# Patient Record
Sex: FEMALE | Race: WHITE | NOT HISPANIC OR LATINO | Employment: OTHER | URBAN - METROPOLITAN AREA
[De-identification: names, ages, dates, MRNs, and addresses within clinical notes are randomized per-mention and may not be internally consistent; named-entity substitution may affect disease eponyms.]

---

## 2017-01-03 RX ORDER — DULOXETIN HYDROCHLORIDE 60 MG/1
CAPSULE, DELAYED RELEASE ORAL
Qty: 30 CAPSULE | Refills: 0 | Status: SHIPPED | OUTPATIENT
Start: 2017-01-03 | End: 2017-01-30 | Stop reason: SDUPTHER

## 2017-01-05 ENCOUNTER — TELEPHONE (OUTPATIENT)
Dept: NEUROLOGY | Facility: CLINIC | Age: 62
End: 2017-01-05

## 2017-01-05 NOTE — TELEPHONE ENCOUNTER
S/W Ms. Garcia, she states still has some numbness left hand at times but so much improved.  We reviewed her meds and confirmed her F/U appt  2-28-17 she has received her new patient packet already.  We reviewed S/S of stroke and to call 911 at any signs.  ELHAM Vega RN

## 2017-01-20 ENCOUNTER — OFFICE VISIT (OUTPATIENT)
Dept: INTERNAL MEDICINE | Facility: CLINIC | Age: 62
End: 2017-01-20

## 2017-01-20 VITALS
BODY MASS INDEX: 30.56 KG/M2 | TEMPERATURE: 98.8 F | DIASTOLIC BLOOD PRESSURE: 84 MMHG | RESPIRATION RATE: 16 BRPM | HEART RATE: 84 BPM | WEIGHT: 179 LBS | HEIGHT: 64 IN | SYSTOLIC BLOOD PRESSURE: 120 MMHG | OXYGEN SATURATION: 99 %

## 2017-01-20 DIAGNOSIS — E03.9 ACQUIRED HYPOTHYROIDISM: ICD-10-CM

## 2017-01-20 DIAGNOSIS — I63.81 RIGHT-SIDED LACUNAR INFARCTION (HCC): ICD-10-CM

## 2017-01-20 DIAGNOSIS — I10 ESSENTIAL HYPERTENSION: Primary | ICD-10-CM

## 2017-01-20 PROBLEM — I73.00 RAYNAUD'S DISEASE WITHOUT GANGRENE: Status: ACTIVE | Noted: 2017-01-20

## 2017-01-20 PROCEDURE — 99213 OFFICE O/P EST LOW 20 MIN: CPT | Performed by: INTERNAL MEDICINE

## 2017-01-20 NOTE — PROGRESS NOTES
Subjective   Candy Garcia is a 61 y.o. female.   1/1/2017  Wt Readings from Last 1 Encounters:   01/20/17 179 lb (81.2 kg)    she was last seen December 2016, weight 187 then, 179 now.    She returns for follow-up of hypertension, recent lacunar stroke, hyperlipidemia, hypothyroidism    History of Present Illness   She was hospitalized late November 2016 presenting with left face and arm numbness.  She was found to have a right thalamic lacunar infarct measuring 1.1 x 0.3 cm.  Her MRA was negative and she was started on medication for blood pressure and cholesterol as well as aspirin.  She was taken off of estrogenic.  She has been working on weight reduction and increasing her exercise.  Blood pressures at home are looking much better, generally in the 120/70 range.  She still has some residual numbness in the extremities.  Her pressure was elevated recently and her medication was changed to lisinopril/HCTZ 20/12.5 one each day with benefit.  She does not have any chest pain or known ischemic heart disease.    She has had Raynaud's disease since in her 20s.  She describes typical color changes in the intensive fingers on cold exposure.  The following portions of the patient's history were reviewed and updated as appropriate: allergies, current medications, past family history, past medical history, past social history, past surgical history and problem list.    Review of Systems   Constitutional: Negative.    HENT: Negative.    Eyes: Negative.    Respiratory: Negative.  Negative for shortness of breath.    Cardiovascular: Negative.  Negative for chest pain and palpitations.   Endocrine: Negative.    Genitourinary: Negative.    Skin: Negative.    Neurological: Negative.  Negative for headaches.   Hematological: Negative.    Psychiatric/Behavioral: Negative.        Objective   Physical Exam   Constitutional: She is oriented to person, place, and time. She appears well-developed and well-nourished.   HENT:   Head:  Normocephalic and atraumatic.   Cardiovascular: Normal rate and regular rhythm.  Exam reveals no gallop and no friction rub.    No murmur heard.  Pulmonary/Chest: Effort normal and breath sounds normal. No respiratory distress. She has no wheezes. She has no rales.   Abdominal: She exhibits no distension and no mass. There is no tenderness.   Neurological: She is alert and oriented to person, place, and time. She has normal reflexes.   Gait coordination and motor strength are all normal   Skin: Skin is warm.   Psychiatric: Her behavior is normal.   Vitals reviewed.      Assessment/Plan   Candy was seen today for hypertension, hyperlipidemia and hypothyroidism.    Diagnoses and all orders for this visit:    Essential hypertension  Comments:  Blood pressure 130/80 by my determination and somewhat lower at home, continue lisinopril 20 mg and HCTZ 12.5 mg daily  Orders:  -     Comprehensive Metabolic Panel    Acquired hypothyroidism  Comments:  Continue levothyroxin 112 µg daily    Right-sided lacunar infarction  Comments:  Continue aspirin daily and atorvastatin 40 mg daily, we will check chemistries and lipids  Orders:  -     Lipid Panel        Schedule office follow-up about 4 months, return sooner if needed                    EMR Dragon/Transcription disclaimer:      Much of this encounter note is an electronic transcription/translation of spoken language to printed text. The electronic translation of spoken language may permit erroneous, or at times, nonsensical words or phrases to be inadvertently transcribed; Although I have reviewed the note for such errors, some may still exist.

## 2017-01-20 NOTE — MR AVS SNAPSHOT
Candy Garcia   1/20/2017 2:00 PM   Office Visit    Dept Phone:  [phone]   Encounter #:  [id number]    Provider:  Aaron Allison MD   Department:  [facility] INTERNAL MEDICINE                Your Full Care Plan              Your Updated Medication List          This list is accurate as of: 1/20/17  2:49 PM.  Always use your most recent med list.                albuterol 108 (90 BASE) MCG/ACT inhaler   Commonly known as:  PROVENTIL HFA;VENTOLIN HFA       aspirin  MG tablet   Take 1 tablet by mouth Daily.       atorvastatin 40 MG tablet   Commonly known as:  LIPITOR   Take 1 tablet by mouth Every Night.       DULoxetine 60 MG capsule   Commonly known as:  CYMBALTA   TAKE ONE CAPSULE BY MOUTH DAILY       Halcinonide 0.1 % cream       levothyroxine 112 MCG tablet   Commonly known as:  SYNTHROID, LEVOTHROID   TAKE ONE TABLET BY MOUTH DAILY FOR THYROID       lisinopril 10 MG tablet   Commonly known as:  PRINIVIL,ZESTRIL   Take 1 tablet by mouth Daily.       lisinopril-hydrochlorothiazide 20-12.5 MG per tablet   Commonly known as:  PRINZIDE,ZESTORETIC   Take 1 tablet by mouth Daily.       omeprazole 20 MG capsule   Commonly known as:  priLOSEC               We Performed the Following     Comprehensive Metabolic Panel     Lipid Panel       You Were Diagnosed With        Codes Comments    Essential hypertension    -  Primary ICD-10-CM: I10  ICD-9-CM: 401.9 Blood pressure 130/80 by my determination and somewhat lower at home, continue lisinopril 20 mg and HCTZ 12.5 mg daily    Acquired hypothyroidism     ICD-10-CM: E03.9  ICD-9-CM: 244.9 Continue levothyroxin 112 µg daily    Right-sided lacunar infarction     ICD-10-CM: I63.9  ICD-9-CM: 434.91 Continue aspirin daily and atorvastatin 40 mg daily, we will check chemistries and lipids      Instructions     None    Patient Instructions History      Upcoming Appointments     Visit Type Date Time Department    OFFICE VISIT  "1/20/2017  2:00 PM [facility] 4003    FOLLOW UP 2/28/2017  8:30 AM [facility]    OFFICE VISIT 5/26/2017 10:00 AM [facility] 4003      MyChart Signup     Our records indicate that you have an active [facility] account.    You can view your After Visit Summary by going to [employer] and logging in with your [facility] username and password.  If you don't have a [facility] username and password but a parent or guardian has access to your record, the parent or guardian should login with their own [facility] username and password and access your record to view the After Visit Summary.    If you have questions, you can email [email or web address] or call [phone] to talk to our [facility] staff.  Remember, [employer] is NOT to be used for urgent needs.  For medical emergencies, dial 911.               Other Info from Your Visit           Your Appointments     Feb 28, 2017  8:30 AM EST   Follow Up with TARAS Goldstein   [facility] NEUROLOGY (--)    [address]  [facility] [address]   [phone]           Arrive 30 minutes prior to appointment            May 26, 2017 10:00 AM EDT   Office Visit with Aaron Allison MD   [facility] INTERNAL MEDICINE (--)    [address]  [facility] [address]   [phone]           Arrive 15 minutes prior to appointment.              Allergies     Penicillins  Anaphylaxis    Occurred when pt was 16 years old      Reason for Visit     Hypertension     Hyperlipidemia     Hypothyroidism           Vital Signs     Blood Pressure Pulse Temperature Respirations Height Weight    120/84 (BP Location: Right arm, Patient Position: Sitting, Cuff Size: Small Adult) 84 98.8 °F (37.1 °C) (Tympanic) 16 64\" (162.6 cm) 179 lb (81.2 kg)    Oxygen Saturation Body Mass Index Smoking Status             99% 30.73 kg/m2 Never Smoker         Problems and Diagnoses Noted     High blood pressure    " Underactive thyroid    Raynaud's disease without gangrene    Right-sided lacunar infarction

## 2017-01-21 LAB
ALBUMIN SERPL-MCNC: 5 G/DL (ref 3.5–5.2)
ALBUMIN/GLOB SERPL: 2 G/DL
ALP SERPL-CCNC: 140 U/L (ref 39–117)
ALT SERPL-CCNC: 87 U/L (ref 1–33)
AST SERPL-CCNC: 45 U/L (ref 1–32)
BILIRUB SERPL-MCNC: 0.7 MG/DL (ref 0.1–1.2)
BUN SERPL-MCNC: 12 MG/DL (ref 8–23)
BUN/CREAT SERPL: 14.3 (ref 7–25)
CALCIUM SERPL-MCNC: 10.2 MG/DL (ref 8.6–10.5)
CHLORIDE SERPL-SCNC: 100 MMOL/L (ref 98–107)
CHOLEST SERPL-MCNC: 145 MG/DL (ref 0–200)
CO2 SERPL-SCNC: 32.1 MMOL/L (ref 22–29)
CREAT SERPL-MCNC: 0.84 MG/DL (ref 0.57–1)
GLOBULIN SER CALC-MCNC: 2.5 GM/DL
GLUCOSE SERPL-MCNC: 107 MG/DL (ref 65–99)
HDLC SERPL-MCNC: 63 MG/DL (ref 40–60)
LDLC SERPL CALC-MCNC: 59 MG/DL (ref 0–100)
POTASSIUM SERPL-SCNC: 4.7 MMOL/L (ref 3.5–5.2)
PROT SERPL-MCNC: 7.5 G/DL (ref 6–8.5)
SODIUM SERPL-SCNC: 143 MMOL/L (ref 136–145)
TRIGL SERPL-MCNC: 115 MG/DL (ref 0–150)
VLDLC SERPL CALC-MCNC: 23 MG/DL (ref 5–40)

## 2017-01-30 RX ORDER — DULOXETIN HYDROCHLORIDE 60 MG/1
CAPSULE, DELAYED RELEASE ORAL
Qty: 30 CAPSULE | Refills: 0 | Status: SHIPPED | OUTPATIENT
Start: 2017-01-30 | End: 2017-03-03 | Stop reason: SDUPTHER

## 2017-02-10 DIAGNOSIS — R74.8 ELEVATED LIVER ENZYMES: Primary | ICD-10-CM

## 2017-02-17 ENCOUNTER — HOSPITAL ENCOUNTER (OUTPATIENT)
Dept: ULTRASOUND IMAGING | Facility: HOSPITAL | Age: 62
Discharge: HOME | End: 2017-02-17
Attending: INTERNAL MEDICINE | Admitting: INTERNAL MEDICINE

## 2017-02-17 DIAGNOSIS — R74.8 ELEVATED LIVER ENZYMES: ICD-10-CM

## 2017-02-17 PROCEDURE — 76705 ECHO EXAM OF ABDOMEN: CPT

## 2017-02-21 ENCOUNTER — TELEPHONE (OUTPATIENT)
Dept: INTERNAL MEDICINE | Facility: CLINIC | Age: 62
End: 2017-02-21

## 2017-02-21 NOTE — TELEPHONE ENCOUNTER
The ultrasound of the liver was read as totally normal without any evidence of bile duct problem or enlargement of the liver.  It does not give us the answer for elevated liver enzymes.

## 2017-02-28 ENCOUNTER — OFFICE VISIT (OUTPATIENT)
Dept: NEUROLOGY | Facility: CLINIC | Age: 62
End: 2017-02-28

## 2017-02-28 VITALS
DIASTOLIC BLOOD PRESSURE: 83 MMHG | OXYGEN SATURATION: 97 % | HEIGHT: 64 IN | WEIGHT: 175 LBS | SYSTOLIC BLOOD PRESSURE: 125 MMHG | BODY MASS INDEX: 29.88 KG/M2 | HEART RATE: 67 BPM

## 2017-02-28 DIAGNOSIS — I63.81 RIGHT-SIDED LACUNAR INFARCTION (HCC): ICD-10-CM

## 2017-02-28 DIAGNOSIS — I73.00 RAYNAUD'S DISEASE WITHOUT GANGRENE: ICD-10-CM

## 2017-02-28 DIAGNOSIS — I10 ESSENTIAL HYPERTENSION: ICD-10-CM

## 2017-02-28 DIAGNOSIS — E78.5 HYPERLIPIDEMIA, UNSPECIFIED HYPERLIPIDEMIA TYPE: Primary | ICD-10-CM

## 2017-02-28 DIAGNOSIS — E03.9 HYPOTHYROIDISM, UNSPECIFIED TYPE: ICD-10-CM

## 2017-02-28 PROCEDURE — 99213 OFFICE O/P EST LOW 20 MIN: CPT | Performed by: NURSE PRACTITIONER

## 2017-02-28 RX ORDER — ATORVASTATIN CALCIUM 20 MG/1
20 TABLET, FILM COATED ORAL DAILY
Qty: 30 TABLET | Refills: 11 | Status: SHIPPED | OUTPATIENT
Start: 2017-02-28 | End: 2017-05-22 | Stop reason: SDUPTHER

## 2017-02-28 RX ORDER — CETIRIZINE HYDROCHLORIDE 5 MG/1
5 TABLET ORAL DAILY
COMMUNITY

## 2017-02-28 NOTE — PROGRESS NOTES
"DOS: 2017  NAME: Candy Garcia   : 1955  PCP: Aaron Allison MD    Chief Complaint   Patient presents with   • Stroke      Neurological Problem and Interval History:  61 y.o. RHW female with a Hx of HTN, Hypothyroid, and Raynauds who presents today for 3-month follow-up of stroke.     Ms. Garcia presented on  with left-sided numbness and tingling. MRI brain showed a right thalamic lacunar stroke. Vessel imaging was negative for any significant stenosis and transthoracic echocardiogram was unremarkable for cardioembolic source. The etiology was most likely thought to be hypertension; however the patient was on estrogen therapy at the time therefore a hypercoaguability could not be excluded. She has since discontinue her premarin. She was not on any antiplatelets or statin therapy at the time of her event. She was discharged home on  mg and Lipitor 40 mg. She states she continued with left sided numbness that resolved at least 3 weeks aftet being discharged. She had significant fatigue along with muscle aches and continues with decreased energy levels. She usually walks for exercise but due to chronic left hip pain, has been unable to walk; however she is planning to start a water aerobics program. She is seeing a retinal specialist today, Dr. Christina due to an episode on  in which she started having flashes of light in the left eye with a vague description of \"seeing through something\". She denies any vision loss, dark areas in visual fields or double vision. She also describes having headaches since her hospitalization that she describes as a dull ache that starts in the back of her neck and is alleviated with Tylenol. She denies any associated symptoms, no N/V. She denies any new stroke/TIA symptoms. She takes her BP regularly and reports that her SBP usually runs below 130.     .Review of Systems:        Review of Systems   Constitutional: Negative for activity change, appetite " "change, chills, diaphoresis, fatigue, fever and unexpected weight change.   HENT: Positive for trouble swallowing (liquid). Negative for drooling, hearing loss, tinnitus and voice change.    Eyes: Positive for visual disturbance (flashes of light in light eye - going to see a retina specialist today). Negative for photophobia and pain.   Respiratory: Negative for chest tightness and shortness of breath.    Cardiovascular: Negative for chest pain, palpitations and leg swelling.   Gastrointestinal: Negative for blood in stool, nausea and vomiting.   Endocrine: Negative for cold intolerance and heat intolerance.   Genitourinary: Negative for difficulty urinating.   Musculoskeletal: Positive for neck pain and neck stiffness. Negative for gait problem.   Skin: Negative for rash.   Neurological: Positive for numbness (fingers and toes - patient has a condition her fingers and toes turn blue, white and goes numb) and headaches (more now than ever in her whole life ). Negative for dizziness, tremors, seizures, syncope, facial asymmetry, speech difficulty, weakness and light-headedness.   Hematological: Bruises/bleeds easily.   Psychiatric/Behavioral: Negative for agitation, behavioral problems, confusion, hallucinations, sleep disturbance and suicidal ideas. The patient is not nervous/anxious.        \"The following portions of the patient's history were reviewed and updated as appropriate: allergies, current medications, past family history, past medical history, past social history, past surgical history and problem list.\"  Review and Interpretation of Imaging: Per Dr. Hubbard,  MRI brain 11/29/16: Acute stroke in the right thalamus. Minimal white matter disease. GRE sequences are positive for a single lesion in the right basis pontis.  MRA h/n 11/29/16: MRA brain normal. With bilateral small posterior communicating arteries. 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 Codominant vertebral arteries and no significant " stenosis     TTE 11/30/16: LVEF 60%, no mass or thrombus; RV nml, LA nml size, No ASD/PFO, saline tests negative; RA nml    Laboratory Results:            Anti-DNA (DS) Ab Qn <1  0 - 9 IU/mL Final   Comment:                                      Negative      <5                                      Equivocal  5 - 9                                      Positive      >9   RNP Antibodies <0.2  0.0 - 0.9 AI Final   Healy Antibodies <0.2  0.0 - 0.9 AI Final   Antiscleroderma-70 Antibodies <0.2  0.0 - 0.9 AI Final   JOYCE SSA (RO) Ab 0.6  0.0 - 0.9 AI Final   JOYCE SSB (LA) Ab <0.2  0.0 - 0.9 AI Final   Antichromatin Antibodies 0.2  0.0 - 0.9 AI Final   ROSA-1 IgG <0.2  0.0 - 0.9 AI Final   Anti-Centromere B Antibodies <0.2  0.0 - 0.9 AI Final      Lab Results   Component Value Date    GLUCOSE 96 11/29/2016    BUN 12 01/20/2017    CREATININE 0.84 01/20/2017    EGFRIFNONA 69 01/20/2017    EGFRIFAFRI 84 01/20/2017    BCR 14.3 01/20/2017    K 4.7 01/20/2017    CO2 32.1 (H) 01/20/2017    CALCIUM 10.2 01/20/2017    PROTENTOTREF 7.5 01/20/2017    ALBUMIN 5.00 01/20/2017    LABIL2 2.0 01/20/2017    AST 45 (H) 01/20/2017    ALT 87 (H) 01/20/2017       Lab Results   Component Value Date    HGBA1C 5.10 11/30/2016     Lab Results   Component Value Date    CHOL 167 11/30/2016     Lab Results   Component Value Date    HDL 63 (H) 01/20/2017    HDL 54 11/30/2016     Lab Results   Component Value Date    LDL 59 01/20/2017     Lab Results   Component Value Date    TRIG 115 01/20/2017    TRIG 129 11/30/2016     No components found for: CHOLHDL  No results found for: RPR  Lab Results   Component Value Date    TSH 1.160 08/29/2016       No results found for: DXYHWDGG40    Physical Examination: NIHSS: 1 mRS: 0  General Appearance:   Well developed, well nourished, well groomed, alert, and cooperative.  HEENT: Normocephalic.   Neck and Spine: Normal range of motion.  Normal alignment. No mass or tenderness. No bruits.  Cardiac: Regular rate and  rhythm. No murmurs.  Peripheral Vasculature: Radial and pedal pulses are equal and symmetric. No signs of distal embolization.  Extremities:    No edema or deformities. Normal joint ROM.  Skin:    No rashes or birth marks.    Neurological examination:  Higher Integrative  Function: Oriented to time, place and person. Normal registration, recall, attention span and concentration. Normal language including comprehension, spontaneous speech, repetition, reading, writing, naming and vocabulary. No neglect with normal visual-spatial function and construction. Normal fund of knowledge and higher integrative function.  CN II: Pupils are equal, round, and reactive to light. Normal visual fields.    CN III IV VI: Extraocular movements are full without nystagmus.   CN V: Subjective decreased left facial sensation to light touch, temperature and pinprick. Normal strength of muscles of mastication.  CN VII: Facial movements are symmetric. No weakness.  CN VIII:   Auditory acuity is normal.  CN IX & X:   Symmetric palatal movement.  CN XI: Sternocleidomastoid and trapezius are normal.  No weakness.  CN XII:   The tongue is midline.  No atrophy or fasciculations.  Motor: Normal muscle strength, bulk and tone in upper and lower extremities.  No fasciculations, rigidity, spasticity, or abnormal movements.  Sensation: Normal to light touch, pinprick, vibration, temperature, and proprioception in arms and legs. Normal graphesthesia and no extinction on DSS.  Station and Gait: Normal gait and station.    Coordination: Finger to nose test shows no dysmetria.  Rapid alternating movements are normal.  Heel to shin normal.    Diagnoses / Discussion:  Ms. Garcia is doing well following her right thalamic lacunar stroke she suffered in early November 2016. The etiology of her stroke is likely hypertension vs.hypercoaguability secondary to estrogen use. She has since discontinued her premarin. On exam she does express some continued subtle  decreased left facial sensation. We discussed continuing to monitor her BP daily and keep diary with a goal BP of < 130/80. I reviewed her recent lab work done at her PCP office showing improvement of her LDL from 87 to 59; however there has been an increase in her LFTs and c/o of continued muscle aches. I will lower her Lipitor to 20 mg.  She will need to f/u with her PCP for repeat lipid panel/LFTs in 6-8 weeks. We discussed that she may benefit from CO-Q10 to help reduce statin induced muscle aches. We discussed signs/symptoms of stroke/TIA and importance of calling 911 if she were to experience any of these symptoms. She is to follow-up in 9 months.     Plan:   Continue ASA daily   Decrease Lipitor to 20 mg (LDL is 59)   F/U with PCP in 6-8 weeks for repeat lipid panel, LFTs.   Continue to monitor blood pressure, keep diary   Blood pressure control to <130/80   Serum glucose < 140     Call 911 for stroke any stroke symptoms   Follow-up in 9 months  There are no diagnoses linked to this encounter.    Coding

## 2017-03-06 RX ORDER — DULOXETIN HYDROCHLORIDE 60 MG/1
CAPSULE, DELAYED RELEASE ORAL
Qty: 30 CAPSULE | Refills: 0 | Status: SHIPPED | OUTPATIENT
Start: 2017-03-06 | End: 2017-03-28 | Stop reason: SDUPTHER

## 2017-03-09 ENCOUNTER — TELEPHONE (OUTPATIENT)
Dept: NEUROLOGY | Facility: CLINIC | Age: 62
End: 2017-03-09

## 2017-03-09 NOTE — TELEPHONE ENCOUNTER
I S/W Ms. Garcia. She is doing very well back to normal.  She is changing PCP's.  Dr Allison in semi retiring and she is changing to Dr. Lagos.  We reviewed her meds and she continues with ASA 325EC and Lipitor 20mg and Zestril.  She an appt in early May with Dr. Lagos.  MRS 0.  She can call me with any questions.  We reviewed S/S of stroke and to call 911 immediately.  ELHAM Vega RN

## 2017-03-28 RX ORDER — DULOXETIN HYDROCHLORIDE 60 MG/1
CAPSULE, DELAYED RELEASE ORAL
Qty: 30 CAPSULE | Refills: 0 | Status: SHIPPED | OUTPATIENT
Start: 2017-03-28 | End: 2017-04-29 | Stop reason: SDUPTHER

## 2017-05-01 RX ORDER — ASPIRIN 325 MG
TABLET, DELAYED RELEASE (ENTERIC COATED) ORAL
Qty: 30 TABLET | Refills: 2 | Status: SHIPPED | OUTPATIENT
Start: 2017-05-01 | End: 2017-05-22 | Stop reason: SDUPTHER

## 2017-05-01 RX ORDER — DULOXETIN HYDROCHLORIDE 60 MG/1
CAPSULE, DELAYED RELEASE ORAL
Qty: 30 CAPSULE | Refills: 0 | Status: SHIPPED | OUTPATIENT
Start: 2017-05-01 | End: 2017-06-27 | Stop reason: SDUPTHER

## 2017-05-08 ENCOUNTER — OFFICE VISIT (OUTPATIENT)
Dept: FAMILY MEDICINE CLINIC | Facility: CLINIC | Age: 62
End: 2017-05-08

## 2017-05-08 VITALS
HEART RATE: 75 BPM | SYSTOLIC BLOOD PRESSURE: 120 MMHG | DIASTOLIC BLOOD PRESSURE: 70 MMHG | WEIGHT: 176.8 LBS | OXYGEN SATURATION: 99 % | HEIGHT: 64 IN | BODY MASS INDEX: 30.19 KG/M2 | TEMPERATURE: 97.9 F

## 2017-05-08 DIAGNOSIS — E03.9 HYPOTHYROIDISM, UNSPECIFIED TYPE: ICD-10-CM

## 2017-05-08 DIAGNOSIS — I63.81 RIGHT-SIDED LACUNAR INFARCTION (HCC): ICD-10-CM

## 2017-05-08 DIAGNOSIS — E78.00 HYPERCHOLESTEROLEMIA: ICD-10-CM

## 2017-05-08 DIAGNOSIS — I10 ESSENTIAL HYPERTENSION: Primary | ICD-10-CM

## 2017-05-08 PROCEDURE — 99214 OFFICE O/P EST MOD 30 MIN: CPT | Performed by: INTERNAL MEDICINE

## 2017-05-16 RX ORDER — LEVOTHYROXINE SODIUM 112 UG/1
TABLET ORAL
Qty: 30 TABLET | Refills: 0 | Status: SHIPPED | OUTPATIENT
Start: 2017-05-16 | End: 2017-06-08 | Stop reason: SDUPTHER

## 2017-05-22 ENCOUNTER — TELEPHONE (OUTPATIENT)
Dept: FAMILY MEDICINE CLINIC | Facility: CLINIC | Age: 62
End: 2017-05-22

## 2017-05-22 DIAGNOSIS — E78.5 HYPERLIPIDEMIA, UNSPECIFIED HYPERLIPIDEMIA TYPE: ICD-10-CM

## 2017-05-22 RX ORDER — ATORVASTATIN CALCIUM 20 MG/1
20 TABLET, FILM COATED ORAL DAILY
Qty: 90 TABLET | Refills: 1 | Status: SHIPPED | OUTPATIENT
Start: 2017-05-22 | End: 2017-06-27 | Stop reason: SDUPTHER

## 2017-05-22 RX ORDER — ASPIRIN 325 MG
325 TABLET, DELAYED RELEASE (ENTERIC COATED) ORAL DAILY
Qty: 90 TABLET | Refills: 1 | Status: SHIPPED | OUTPATIENT
Start: 2017-05-22 | End: 2017-06-27 | Stop reason: SDUPTHER

## 2017-05-26 ENCOUNTER — APPOINTMENT (OUTPATIENT)
Dept: WOMENS IMAGING | Facility: HOSPITAL | Age: 62
End: 2017-05-26

## 2017-05-26 PROCEDURE — 77063 BREAST TOMOSYNTHESIS BI: CPT | Performed by: RADIOLOGY

## 2017-05-26 PROCEDURE — MDREVIEWSP: Performed by: RADIOLOGY

## 2017-05-26 PROCEDURE — 76641 ULTRASOUND BREAST COMPLETE: CPT | Performed by: RADIOLOGY

## 2017-05-26 PROCEDURE — G0202 SCR MAMMO BI INCL CAD: HCPCS | Performed by: RADIOLOGY

## 2017-05-26 PROCEDURE — 77067 SCR MAMMO BI INCL CAD: CPT | Performed by: RADIOLOGY

## 2017-06-02 RX ORDER — LISINOPRIL AND HYDROCHLOROTHIAZIDE 20; 12.5 MG/1; MG/1
TABLET ORAL
Qty: 30 TABLET | Refills: 0 | Status: SHIPPED | OUTPATIENT
Start: 2017-06-02 | End: 2017-06-27 | Stop reason: SDUPTHER

## 2017-06-02 RX ORDER — DULOXETIN HYDROCHLORIDE 60 MG/1
CAPSULE, DELAYED RELEASE ORAL
Qty: 30 CAPSULE | Refills: 0 | Status: SHIPPED | OUTPATIENT
Start: 2017-06-02 | End: 2017-06-27 | Stop reason: SDUPTHER

## 2017-06-08 RX ORDER — LEVOTHYROXINE SODIUM 112 UG/1
TABLET ORAL
Qty: 30 TABLET | Refills: 0 | Status: SHIPPED | OUTPATIENT
Start: 2017-06-08 | End: 2017-06-27 | Stop reason: SDUPTHER

## 2017-06-27 ENCOUNTER — TELEPHONE (OUTPATIENT)
Dept: FAMILY MEDICINE CLINIC | Facility: CLINIC | Age: 62
End: 2017-06-27

## 2017-06-27 DIAGNOSIS — E78.5 HYPERLIPIDEMIA, UNSPECIFIED HYPERLIPIDEMIA TYPE: ICD-10-CM

## 2017-06-27 RX ORDER — LISINOPRIL AND HYDROCHLOROTHIAZIDE 20; 12.5 MG/1; MG/1
1 TABLET ORAL DAILY
Qty: 90 TABLET | Refills: 1 | Status: SHIPPED | OUTPATIENT
Start: 2017-06-27 | End: 2017-12-28 | Stop reason: SDUPTHER

## 2017-06-27 RX ORDER — ATORVASTATIN CALCIUM 20 MG/1
20 TABLET, FILM COATED ORAL DAILY
Qty: 90 TABLET | Refills: 1 | Status: SHIPPED | OUTPATIENT
Start: 2017-06-27 | End: 2018-06-27

## 2017-06-27 RX ORDER — ASPIRIN 325 MG
325 TABLET, DELAYED RELEASE (ENTERIC COATED) ORAL DAILY
Qty: 90 TABLET | Refills: 1 | Status: SHIPPED | OUTPATIENT
Start: 2017-06-27 | End: 2019-09-23 | Stop reason: SDUPTHER

## 2017-06-27 RX ORDER — LEVOTHYROXINE SODIUM 112 UG/1
112 TABLET ORAL DAILY
Qty: 90 TABLET | Refills: 1 | Status: SHIPPED | OUTPATIENT
Start: 2017-06-27 | End: 2017-08-01

## 2017-06-27 RX ORDER — DULOXETIN HYDROCHLORIDE 60 MG/1
60 CAPSULE, DELAYED RELEASE ORAL DAILY
Qty: 90 CAPSULE | Refills: 1 | Status: SHIPPED | OUTPATIENT
Start: 2017-06-27 | End: 2017-12-11 | Stop reason: SDUPTHER

## 2017-06-27 RX ORDER — DULOXETIN HYDROCHLORIDE 60 MG/1
CAPSULE, DELAYED RELEASE ORAL
Qty: 30 CAPSULE | Refills: 0 | Status: SHIPPED | OUTPATIENT
Start: 2017-06-27 | End: 2018-02-13 | Stop reason: SDUPTHER

## 2017-06-27 NOTE — TELEPHONE ENCOUNTER
SPOKE TO PATIENT AND INFORMED HER THAT THE PRESCRIPTIONS WERE SENT TO THE PHARMACY AND THAT ONCE SHE HAS EVERYTHING DONE FOR PREOP, SHE SHOULD MAKE AN APPT TO SEE DR. CORONA SO HE COULD GIVE CLEARANCE   ----- Message from Haritha Prince MA sent at 6/27/2017  3:42 PM EDT -----  Contact: PATIENT [phone]  PT NEEDS A NEW RX FOR 90 DAY SUPPLIES FOR SEVERAL OF HER MEDICATIONS SENT TO [facility] STATION      DULOXETINE 60MG ONCE DAILY #90  ATORVASTATIN 20MG ONCE DAILY #90  LISINOPRIL-HCTZ 20-12.5MG  ONCE DAILY #90  LEVOTHYROXINE 112MCG ONCE DAILY #90  ASPIRIN EC 325MG TAB #90    ALSO PT SAID THAT SHE TALKED WITH DR. CORONA ABOUT GETTING A SURGICAL CLEARANCE FOR UPCOMING LEFT HIP REPLACEMENT. DOES SHE NEED AN APPT FOR THIS? SHE IS THINKING SHE DOES NOT SINCE SHE ALREADY TALKED WITH DR. CORONA ABOUT IT? PLEASE CALL PATIENT [phone].

## 2017-06-29 ENCOUNTER — TELEPHONE (OUTPATIENT)
Dept: NEUROLOGY | Facility: CLINIC | Age: 62
End: 2017-06-29

## 2017-06-29 NOTE — TELEPHONE ENCOUNTER
"----- Message from TARAS Goldstein sent at 6/28/2017  2:31 PM EDT -----  Contact: PATIENT [phone]  Her stroke was in November of last year so if the patient has not had any recurrent neurologic symptoms we will clear her with the \"usual recommendations\", can you please get the fax number and fax that info to them. Thanks.   ----- Message -----     From: Rahel Knox MA     Sent: 6/27/2017   3:46 PM       To: TARAS Goldstein        ----- Message -----     From: Chelsea Liao     Sent: 6/27/2017   3:31 PM       To: Rahel Knox MA    PT IS HAVING A HIP SURGERY AND NEEDED SURGERY CLEARANCE .  PH[facility] NUMBER -[phone] [facility]\"" [place]...AND SHE DOESN'T HAVE THERE FAX.. PLEASE SEND THIS OVER. THANKS SHE IS HAVING THIS AUG 9TH.    "

## 2017-07-17 DIAGNOSIS — I10 ESSENTIAL HYPERTENSION: Primary | ICD-10-CM

## 2017-07-17 DIAGNOSIS — E03.9 HYPOTHYROIDISM, UNSPECIFIED TYPE: ICD-10-CM

## 2017-07-17 DIAGNOSIS — E78.00 HYPERCHOLESTEROLEMIA: ICD-10-CM

## 2017-07-25 DIAGNOSIS — I10 ESSENTIAL HYPERTENSION: Primary | ICD-10-CM

## 2017-07-26 LAB
ALBUMIN SERPL-MCNC: 4.6 G/DL (ref 3.5–5.2)
ALBUMIN/GLOB SERPL: 1.8 G/DL
ALP SERPL-CCNC: 160 U/L (ref 39–117)
ALT SERPL-CCNC: 52 U/L (ref 1–33)
AST SERPL-CCNC: 25 U/L (ref 1–32)
BASOPHILS # BLD AUTO: 0.02 10*3/MM3 (ref 0–0.2)
BASOPHILS NFR BLD AUTO: 0.4 % (ref 0–1.5)
BILIRUB SERPL-MCNC: 0.6 MG/DL (ref 0.1–1.2)
BUN SERPL-MCNC: 13 MG/DL (ref 8–23)
BUN/CREAT SERPL: 16.3 (ref 7–25)
CALCIUM SERPL-MCNC: 10.2 MG/DL (ref 8.6–10.5)
CHLORIDE SERPL-SCNC: 100 MMOL/L (ref 98–107)
CHOLEST SERPL-MCNC: 165 MG/DL (ref 0–200)
CO2 SERPL-SCNC: 27.7 MMOL/L (ref 22–29)
CREAT SERPL-MCNC: 0.8 MG/DL (ref 0.57–1)
EOSINOPHIL # BLD AUTO: 0.13 10*3/MM3 (ref 0–0.7)
EOSINOPHIL NFR BLD AUTO: 2.7 % (ref 0.3–6.2)
ERYTHROCYTE [DISTWIDTH] IN BLOOD BY AUTOMATED COUNT: 13.6 % (ref 11.7–13)
GLOBULIN SER CALC-MCNC: 2.6 GM/DL
GLUCOSE SERPL-MCNC: 86 MG/DL (ref 65–99)
HCT VFR BLD AUTO: 47.2 % (ref 35.6–45.5)
HDLC SERPL-MCNC: 60 MG/DL (ref 40–60)
HGB BLD-MCNC: 14.6 G/DL (ref 11.9–15.5)
IMM GRANULOCYTES # BLD: 0 10*3/MM3 (ref 0–0.03)
IMM GRANULOCYTES NFR BLD: 0 % (ref 0–0.5)
LDLC SERPL CALC-MCNC: 76 MG/DL (ref 0–100)
LDLC/HDLC SERPL: 1.27 {RATIO}
LYMPHOCYTES # BLD AUTO: 1.35 10*3/MM3 (ref 0.9–4.8)
LYMPHOCYTES NFR BLD AUTO: 27.7 % (ref 19.6–45.3)
MCH RBC QN AUTO: 29 PG (ref 26.9–32)
MCHC RBC AUTO-ENTMCNC: 30.9 G/DL (ref 32.4–36.3)
MCV RBC AUTO: 93.7 FL (ref 80.5–98.2)
MONOCYTES # BLD AUTO: 0.29 10*3/MM3 (ref 0.2–1.2)
MONOCYTES NFR BLD AUTO: 6 % (ref 5–12)
NEUTROPHILS # BLD AUTO: 3.08 10*3/MM3 (ref 1.9–8.1)
NEUTROPHILS NFR BLD AUTO: 63.2 % (ref 42.7–76)
PLATELET # BLD AUTO: 233 10*3/MM3 (ref 140–500)
POTASSIUM SERPL-SCNC: 4.2 MMOL/L (ref 3.5–5.2)
PROT SERPL-MCNC: 7.2 G/DL (ref 6–8.5)
RBC # BLD AUTO: 5.04 10*6/MM3 (ref 3.9–5.2)
SODIUM SERPL-SCNC: 142 MMOL/L (ref 136–145)
T4 FREE SERPL-MCNC: 1.76 NG/DL (ref 0.93–1.7)
TRIGL SERPL-MCNC: 145 MG/DL (ref 0–150)
TSH SERPL DL<=0.005 MIU/L-ACNC: 0.02 MIU/ML (ref 0.27–4.2)
VLDLC SERPL CALC-MCNC: 29 MG/DL (ref 5–40)
WBC # BLD AUTO: 4.87 10*3/MM3 (ref 4.5–10.7)

## 2017-08-01 ENCOUNTER — OFFICE VISIT (OUTPATIENT)
Dept: FAMILY MEDICINE CLINIC | Facility: CLINIC | Age: 62
End: 2017-08-01

## 2017-08-01 VITALS
RESPIRATION RATE: 16 BRPM | HEART RATE: 89 BPM | HEIGHT: 64 IN | TEMPERATURE: 98 F | OXYGEN SATURATION: 98 % | WEIGHT: 177 LBS | DIASTOLIC BLOOD PRESSURE: 70 MMHG | BODY MASS INDEX: 30.22 KG/M2 | SYSTOLIC BLOOD PRESSURE: 120 MMHG

## 2017-08-01 DIAGNOSIS — I10 ESSENTIAL HYPERTENSION: ICD-10-CM

## 2017-08-01 DIAGNOSIS — E03.9 HYPOTHYROIDISM, UNSPECIFIED TYPE: ICD-10-CM

## 2017-08-01 DIAGNOSIS — Z01.818 PREOPERATIVE EVALUATION TO RULE OUT SURGICAL CONTRAINDICATION: ICD-10-CM

## 2017-08-01 DIAGNOSIS — E78.00 HYPERCHOLESTEROLEMIA: Primary | ICD-10-CM

## 2017-08-01 PROCEDURE — 99214 OFFICE O/P EST MOD 30 MIN: CPT | Performed by: INTERNAL MEDICINE

## 2017-08-01 RX ORDER — LEVOTHYROXINE SODIUM 0.1 MG/1
100 TABLET ORAL DAILY
Qty: 90 TABLET | Refills: 3 | Status: SHIPPED | OUTPATIENT
Start: 2017-08-01 | End: 2018-02-13

## 2017-08-01 NOTE — PROGRESS NOTES
Subjective   Candy Garcia is a 62 y.o. female. Patient is here today for   Chief Complaint   Patient presents with   • Hypertension     lab f/u    • Hyperlipidemia          Vitals:    08/01/17 0808   BP: 120/70   Pulse: 89   Resp: 16   Temp: 98 °F (36.7 °C)   SpO2: 98%       Past Medical History:   Diagnosis Date   • Acute pansinusitis    • Asthma    • Depression    • Disease of thyroid gland    • GERD (gastroesophageal reflux disease)    • Hypertension    • Irritable bowel syndrome 1/19/2016   • Raynaud's phenomenon    • Right-sided lacunar infarction 12/1/2016   • Stroke    • Tremor 1/19/2016      Allergies   Allergen Reactions   • Penicillins Anaphylaxis     Occurred when pt was 16 years old      Social History     Social History   • Marital status:      Spouse name: N/A   • Number of children: N/A   • Years of education: N/A     Occupational History   • Not on file.     Social History Main Topics   • Smoking status: Never Smoker   • Smokeless tobacco: Not on file   • Alcohol use Yes   • Drug use: Defer   • Sexual activity: Defer     Other Topics Concern   • Not on file     Social History Narrative        Current Outpatient Prescriptions:   •  albuterol (PROVENTIL HFA;VENTOLIN HFA) 108 (90 BASE) MCG/ACT inhaler, Inhale 1-2 puffs every 4 (four) hours as needed for wheezing., Disp: , Rfl:   •  aspirin  MG tablet, Take 1 tablet by mouth Daily., Disp: 90 tablet, Rfl: 1  •  atorvastatin (LIPITOR) 20 MG tablet, Take 1 tablet by mouth Daily., Disp: 90 tablet, Rfl: 1  •  cetirizine (zyrTEC) 5 MG tablet, Take 5 mg by mouth Daily., Disp: , Rfl:   •  DULoxetine (CYMBALTA) 60 MG capsule, TAKE ONE CAPSULE BY MOUTH DAILY, Disp: 30 capsule, Rfl: 0  •  DULoxetine (CYMBALTA) 60 MG capsule, Take 1 capsule by mouth Daily., Disp: 90 capsule, Rfl: 1  •  Halcinonide 0.1 % cream, Apply topically. Apply inch.  PRN, Disp: , Rfl:   •  lisinopril-hydrochlorothiazide (PRINZIDE,ZESTORETIC) 20-12.5 MG per tablet, Take 1 tablet by  mouth Daily., Disp: 90 tablet, Rfl: 1  •  omeprazole (PriLOSEC) 20 MG capsule, Take 2 tablets by mouth every morning., Disp: , Rfl:   •  levothyroxine (SYNTHROID) 100 MCG tablet, Take 1 tablet by mouth Daily., Disp: 90 tablet, Rfl: 3     Objective     HPI Comments: She is here for a preoperative clearance.  She is to have left anterior approach hip surgery on 9 September.    This past November she was in the hospital and had some left-sided facial numbness.  This was due to a right-sided thalamic infarct.  She tells me that she no longer has any sequelae from her stroke.    She complains only of left-sided hip pain.    She does not smoke cigarettes, she does not drink excess quantities of alcohol.    Hypertension     Hyperlipidemia          Review of Systems   Constitutional: Negative.    HENT: Negative.    Respiratory: Negative.    Cardiovascular: Negative.    Genitourinary: Negative.    Musculoskeletal:        She complains of left-sided hip pain.   Psychiatric/Behavioral: Negative.        Physical Exam   Constitutional: She is oriented to person, place, and time. She appears well-developed and well-nourished.   HENT:   Head: Normocephalic and atraumatic.   Neck:   No carotid bruits, no thyromegaly.   Cardiovascular: Normal rate, regular rhythm and normal heart sounds.  Exam reveals no gallop and no friction rub.    No murmur heard.  Pulmonary/Chest: Effort normal and breath sounds normal. No respiratory distress. She has no wheezes. She has no rales.   Neurological: She is alert and oriented to person, place, and time. She displays normal reflexes. No cranial nerve deficit. Coordination normal.   Psychiatric: She has a normal mood and affect. Her behavior is normal. Judgment and thought content normal.   Nursing note and vitals reviewed.    She had electrocardiogram on July 25.  Dr. Anand read that to be a normal electrocardiogram.    She has not had a PA or lateral chest x-ray.  I don't think that one is  necessary.  A PA and lateral chest x-ray in December was normal.     cbc done on the July 25th revealed no anemia.    A comprehensive metabolic panel did show a slight elevation in her alkaline phosphatase and her ALT.  This is been a persistent finding on her lab work over the years actually.  An ultrasound of her liver showed no fatty infiltration.    Lipid profile revealed her LDL cholesterol a bit more than 70 though I would like to see lower.  She was on 40 mg of atorvastatin.  Her neurologist to decrease that to 20 recently.    Her TSH level was a bit low and her free T4 level was high.  She is taking too much thyroid hormone replacement.  I'm going to decrease her levothyroxine to 100 µg daily.  I'll have her back in about 3 months to recheck a TSH and free T4 as well as a lipid profile and comprehensive metabolic panel.    She is medically cleared for left hip arthroplasty on September 9.    Problem List Items Addressed This Visit        Cardiovascular and Mediastinum    Essential hypertension    Hypercholesterolemia - Primary       Endocrine    Hypothyroidism    Relevant Medications    levothyroxine (SYNTHROID) 100 MCG tablet       Other    Preoperative evaluation to rule out surgical contraindication            PLAN  She is cleared for the surgery planned for her on September 9.    Her hypertension is well-controlled.    Her hypercholesterolemia is relatively well-controlled.    She is taking excess quantity of thyroid hormone.  I changed her thyroxine 200 µg daily.  Like to have her back in 3 months to recheck TSH and free T4.      No Follow-up on file.whom

## 2017-11-28 ENCOUNTER — OFFICE VISIT (OUTPATIENT)
Dept: NEUROLOGY | Facility: CLINIC | Age: 62
End: 2017-11-28

## 2017-11-28 VITALS
DIASTOLIC BLOOD PRESSURE: 62 MMHG | BODY MASS INDEX: 31.76 KG/M2 | HEART RATE: 83 BPM | WEIGHT: 186 LBS | OXYGEN SATURATION: 98 % | SYSTOLIC BLOOD PRESSURE: 112 MMHG | HEIGHT: 64 IN

## 2017-11-28 DIAGNOSIS — I63.81 RIGHT-SIDED LACUNAR INFARCTION (HCC): Primary | ICD-10-CM

## 2017-11-28 DIAGNOSIS — E78.5 HYPERLIPIDEMIA LDL GOAL <70: ICD-10-CM

## 2017-11-28 DIAGNOSIS — I10 ESSENTIAL HYPERTENSION: ICD-10-CM

## 2017-11-28 PROCEDURE — 99212 OFFICE O/P EST SF 10 MIN: CPT | Performed by: NURSE PRACTITIONER

## 2017-11-28 NOTE — PROGRESS NOTES
"DOS: 2017  NAME: Candy Garcia   : 1955  PCP: Maulik Lagos MD    Chief Complaint   Patient presents with   • Hyperlipidemia      Neurological Problem and Interval History:  62 y.o. female with HTN, Hypothyroid and Raynaud's who presents today for stroke follow-up.     Ms. Garcia initially presented to [facility] on  with left-sided numbness and tingling. MRI brain showed a right thalamic lacunar stroke. Vessel imaging was negative for any significant stenosis and transthoracic echocardiogram was unremarkable for cardioembolic source. The etiology was most likely thought to be hypertension; however the patient was on estrogen therapy at the time therefore a hypercoaguability could not be excluded. She has since discontinue her premarin. She was not on any antiplatelets or statin therapy at the time of her event. She was discharged home on  mg and Lipitor 40 mg.     Today she continues on ASA and Lipitor 20 mg and states she is tolerating medications well. She is essentially back to her neurologic baseline.  She denies any recurrence of numbness/tingling, or new stroke/TIA symptoms.  She reports having had a left hip replacement in August and is still suffering with some residual pain, decreased ROM. She also c/o of \"funny feeling\" in chest and feels like she needs to take a deep breath which occurs at least several times a week. She notices more after having caffeine or when she is anxious. She has set up an appointment with cardiology and will be seeing Dr. Joshi in a couple of weeks.  She takes her blood pressure regularly and states it is well-controlled.  She denies smoking.     Review of Systems:        Review of Systems   Constitutional: Negative for chills, fatigue and fever.   HENT: Negative for hearing loss, tinnitus and trouble swallowing.    Eyes: Negative for pain, redness, itching and visual disturbance.   Respiratory: Negative for chest tightness, shortness of breath and wheezing.  " "  Cardiovascular: Positive for chest pain (strange feeling). Negative for palpitations and leg swelling.   Gastrointestinal: Negative for constipation, diarrhea, nausea and vomiting.   Endocrine: Negative for cold intolerance, heat intolerance and polydipsia.   Genitourinary: Negative for decreased urine volume, difficulty urinating, frequency and urgency.   Musculoskeletal: Negative for back pain, neck pain and neck stiffness.   Skin: Negative for color change, rash and wound.   Allergic/Immunologic: Negative for environmental allergies, food allergies and immunocompromised state.   Neurological: Negative for dizziness, tremors, seizures, syncope, facial asymmetry, speech difficulty, weakness, light-headedness, numbness and headaches.   Hematological: Negative for adenopathy.   Psychiatric/Behavioral: Positive for sleep disturbance. Negative for agitation, behavioral problems, confusion, decreased concentration, dysphoric mood, hallucinations, self-injury and suicidal ideas. The patient is not nervous/anxious and is not hyperactive.        \"The following portions of the patient's history were reviewed and updated as appropriate: allergies, current medications, past family history, past medical history, past social history, past surgical history and problem list.\"  Review and Interpretation of Imaging:    Laboratory Results:             Lab Results   Component Value Date    HGBA1C 5.10 11/30/2016     Lab Results   Component Value Date    CHOL 167 11/30/2016     Lab Results   Component Value Date    HDL 60 07/25/2017    HDL 63 (H) 01/20/2017    HDL 54 11/30/2016     Lab Results   Component Value Date    LDL 76 07/25/2017    LDL 59 01/20/2017     Lab Results   Component Value Date    TRIG 145 07/25/2017    TRIG 115 01/20/2017    TRIG 129 11/30/2016     No components found for: CHOLHDL  No results found for: RPR  Lab Results   Component Value Date    TSH 0.019 (L) 07/25/2017       No results found for: " [id number]    Physical Examination: NIHSS: 0 mRS: 0  General Appearance:   Well developed, well nourished, well groomed, alert, and cooperative.  HEENT: Normocephalic.    Neck and Spine: Normal range of motion.  Normal alignment. No mass or tenderness.   Cardiac: Regular rate and rhythm.   Peripheral Vasculature: Radial pulses are equal and symmetric. No signs of distal embolization.  Extremities:    No edema or deformities. Decreased ROM of left hip.  Skin:    No rashes or birth marks.    Neurological examination:  Higher Integrative  Function: Oriented to time, place and person. Normal registration, recall (3/3), attention span and concentration. Normal language including comprehension, spontaneous speech, repetition, reading, writing, naming and vocabulary. No neglect with normal visual-spatial function and construction. Normal fund of knowledge and higher integrative function.  CN II: Pupils are equal, round, and reactive to light. Normal visual acuity and visual fields.    CN III IV VI: Extraocular movements are full without nystagmus.   CN V: Normal facial sensation and strength of muscles of mastication.  CN VII: Facial movements are symmetric. No weakness.  CN VIII:   Auditory acuity is normal.  CN IX & X:   Symmetric palatal movement.  CN XI: Sternocleidomastoid and trapezius are normal.  No weakness.  CN XII:   The tongue is midline.  No atrophy or fasciculations.  Motor: Normal muscle strength, bulk and tone in upper and lower extremities.  No fasciculations, rigidity, spasticity, or abnormal movements.  Sensation: Normal to light touch, temperature, and proprioception in arms and legs. Normal graphesthesia and no extinction on DSS.  Station and Gait: Normal gait and station.    Coordination: Finger to nose test shows no dysmetria.  Heel to shin normal.    Diagnoses / Discussion:  Ms. Garcia is doing well one year following her right thalamic lacunar stroke likely due to hypertension versus  hypercoagulability secondary to estrogen use at the time of her event.  She has essentially remained at her neurologic baseline with no recurrence of symptoms or new stroke/TIA symptoms.  She continues on daily aspirin therapy.  Her most recent LDL done in July shows an increase from 59 to 76.  He states she is scheduled to have repeat labs done with her PCP soon and prefers to wait for any changes in her statin medication until then.  We discussed her recommendation for statin therapy to keep LDL less than 70 for stroke prevention.  She has an appointment with cardiology next week for some atypical sounding palpitations, which I think is reasonable. Discussed the signs and symptoms of stroke/TIA and the importance of calling 911 if she were to experience any of these.  She will follow-up in one year, sooner if symptoms warrant.    Plan:   Continue current medication regimen   Recommend statin therapy for goal LDL < 70   Continue to monitor BP regularly.    Blood pressure control to <130/80    Serum glucose < 140     Call 911 for stroke any stroke symptoms   Follow-up in one year  Candy was seen today for hyperlipidemia.    Diagnoses and all orders for this visit:    Right-sided lacunar infarction    Hyperlipidemia LDL goal <70    Essential hypertension      Coding

## 2017-12-12 ENCOUNTER — OFFICE VISIT (OUTPATIENT)
Dept: CARDIOLOGY | Facility: CLINIC | Age: 62
End: 2017-12-12

## 2017-12-12 VITALS
HEIGHT: 64 IN | HEART RATE: 82 BPM | WEIGHT: 185 LBS | BODY MASS INDEX: 31.58 KG/M2 | DIASTOLIC BLOOD PRESSURE: 80 MMHG | RESPIRATION RATE: 16 BRPM | SYSTOLIC BLOOD PRESSURE: 140 MMHG

## 2017-12-12 DIAGNOSIS — E78.5 HYPERLIPIDEMIA LDL GOAL <70: ICD-10-CM

## 2017-12-12 DIAGNOSIS — I63.81 RIGHT-SIDED LACUNAR INFARCTION (HCC): ICD-10-CM

## 2017-12-12 DIAGNOSIS — R00.2 PALPITATIONS: Primary | ICD-10-CM

## 2017-12-12 DIAGNOSIS — I10 ESSENTIAL HYPERTENSION: ICD-10-CM

## 2017-12-12 PROCEDURE — 93000 ELECTROCARDIOGRAM COMPLETE: CPT | Performed by: INTERNAL MEDICINE

## 2017-12-12 PROCEDURE — 99204 OFFICE O/P NEW MOD 45 MIN: CPT | Performed by: INTERNAL MEDICINE

## 2017-12-12 NOTE — PROGRESS NOTES
1. Palpitations.  My biggest concern would be for atrial fibrillation.  I tried to see about a Zio Patch or a CardioKey but her insurance does not cover it without a Holter first, so I am going to place a Holter monitor on her and see if we can make a diagnosis with that.    2. Hypertension.  Her blood pressure is a little high today but she reports it is usually well controlled.   3. Hyperlipidemia.  She knows that her goal LDL is less than 70 and that she may have to go back up on her statin medication.  4. History of stroke.  She saw TARAS Ceja in 02/2017.  She had a right thalamic lacunar stroke.  She was taken off estrogen replacement therapy.    5. Hypothyroid.  She is on replacement.    I will go over the results of the Holter with her and decide if we want to do a longer term monitor or not.

## 2017-12-12 NOTE — PROGRESS NOTES
This is a woman with a history of hypertension, hyperlipidemia, and stroke.  She comes in today because she has this fluttering sensation in her chest.  It is intermittent.  It is worse if she drinks caffeine.  She feels like it takes her breath away.  There are no relieving factors.  She otherwise denies shortness of breath, lightheadedness, or fatigue.  It is associated with a pressure type sensation.

## 2017-12-12 NOTE — PROGRESS NOTES
PATIENTINFORMATION    Date of Office Visit: 2017  Encounter Provider: Avery Sandoval MD  Place of Service: [facility] CARDIOLOGY  Patient Name: Candy Garcia  : 1955    Subjective:     Encounter Date:2017      Patient ID: Candy Garcia is a 62 y.o. female.      History of Present Illness            This is a woman with a history of hypertension, hyperlipidemia, and stroke.  She comes in today because she has this fluttering sensation in her chest.  It is intermittent.  It is worse if she drinks caffeine.  She feels like it takes her breath away.  There are no relieving factors.  She otherwise denies shortness of breath, lightheadedness, or fatigue.  It is associated with a pressure type sensation.             Review of Systems   Constitution: Negative for fever, malaise/fatigue, weight gain and weight loss.   HENT: Negative for ear pain, hearing loss, nosebleeds and sore throat.    Eyes: Negative for double vision, pain, vision loss in left eye and vision loss in right eye.   Cardiovascular:        See history of present illness.   Respiratory: Negative for cough, shortness of breath, sleep disturbances due to breathing, snoring and wheezing.    Endocrine: Negative for cold intolerance, heat intolerance and polyuria.   Skin: Negative for itching, poor wound healing and rash.   Musculoskeletal: Positive for joint pain. Negative for joint swelling and myalgias.   Gastrointestinal: Negative for abdominal pain, diarrhea, hematochezia, nausea and vomiting.   Genitourinary: Negative for hematuria and hesitancy.   Neurological: Negative for numbness, paresthesias and seizures.   Psychiatric/Behavioral: Negative for depression. The patient is not nervous/anxious.            ECG 12 Lead  Date/Time: 2017 2:20 PM  Performed by: AVERY SANDOVAL  Authorized by: AVERY SANDOVAL   Previous ECG: no previous ECG available  Rhythm: sinus rhythm  BPM: 82  Conduction: conduction  "normal  ST Segments: ST segments normal  T Waves: T waves normal  Clinical impression: normal ECG               Objective:     /80 (BP Location: Right arm, Patient Position: Sitting, Cuff Size: Adult)  Pulse 82  Resp 16  Ht 162.6 cm (64\")  Wt 83.9 kg (185 lb)  BMI 31.76 kg/m2 Body mass index is 31.76 kg/(m^2).     Physical Exam   Constitutional: She appears well-developed.   HENT:   Head: Normocephalic and atraumatic.   Eyes: Conjunctivae and lids are normal. Pupils are equal, round, and reactive to light. Lids are everted and swept, no foreign bodies found.   Neck: Normal range of motion. No JVD present. Carotid bruit is not present. No tracheal deviation present. No thyroid mass present.   Cardiovascular: Normal rate, regular rhythm and normal heart sounds.    Pulses:       Dorsalis pedis pulses are 2+ on the right side, and 2+ on the left side.   Pulmonary/Chest: Effort normal and breath sounds normal.   Abdominal: Normal appearance and bowel sounds are normal.   Musculoskeletal: Normal range of motion.   Neurological: She is alert. She has normal strength.   Skin: Skin is warm, dry and intact.   Psychiatric: She has a normal mood and affect. Her behavior is normal.   Vitals reviewed.            Assessment/Plan:       1. Palpitations.  My biggest concern would be for atrial fibrillation.  I tried to see about a Zio Patch or a CardioKey but her insurance does not cover it without a Holter first, so I am going to place a Holter monitor on her and see if we can make a diagnosis with that.    2. Hypertension.  Her blood pressure is a little high today but she reports it is usually well controlled.   3. Hyperlipidemia.  She knows that her goal LDL is less than 70 and that she may have to go back up on her statin medication.  4. History of stroke.  She saw TARAS Ceja in 02/2017.  She had a right thalamic lacunar stroke.  She was taken off estrogen replacement therapy.    5. Hypothyroid.  She is " on replacement.     I will go over the results of the Holter with her and decide if we want to do a longer term monitor or not.    Orders Placed This Encounter   Procedures   • Holter Monitor - 24 Hour     Standing Status:   Future     Number of Occurrences:   1     Standing Expiration Date:   12/12/2018     Order Specific Question:   Reason for exam?     Answer:   Palpitations   • ECG 12 Lead     This order was created via procedure documentation      Candy Garcia ANA   [place] Medication Instructions GENEVIEVE:    Printed on:12/12/17 2299   Medication Information                      albuterol (PROVENTIL HFA;VENTOLIN HFA) 108 (90 BASE) MCG/ACT inhaler  Inhale 1-2 puffs every 4 (four) hours as needed for wheezing.             aspirin  MG tablet  Take 1 tablet by mouth Daily.             atorvastatin (LIPITOR) 20 MG tablet  Take 1 tablet by mouth Daily.             cetirizine (zyrTEC) 5 MG tablet  Take 5 mg by mouth Daily.             DULoxetine (CYMBALTA) 60 MG capsule  TAKE ONE CAPSULE BY MOUTH DAILY             Halcinonide 0.1 % cream  Apply topically. Apply inch.  PRN             levothyroxine (SYNTHROID) 100 MCG tablet  Take 1 tablet by mouth Daily.             lisinopril-hydrochlorothiazide (PRINZIDE,ZESTORETIC) 20-12.5 MG per tablet  Take 1 tablet by mouth Daily.             omeprazole (PriLOSEC) 20 MG capsule  Take 2 tablets by mouth every morning.                        Eliana Joshi MD  12/12/17  2:21 PM

## 2017-12-15 ENCOUNTER — TELEPHONE (OUTPATIENT)
Dept: CARDIOLOGY | Facility: CLINIC | Age: 62
End: 2017-12-15

## 2017-12-15 NOTE — TELEPHONE ENCOUNTER
The patient called and would like to know if the results for her holter monitor are ready.        Thank you

## 2017-12-26 NOTE — TELEPHONE ENCOUNTER
Pt called back and wanted to know if she was having PVC's on her monitor, and if her tachycardia was an abnormal arrhythmia, she thought you said it was atrial tachycardia. Please advise

## 2017-12-26 NOTE — TELEPHONE ENCOUNTER
I called and spoke with her and reassured her that the atrial tachycardia is not dangerous.  We talked about the PACs and PVCs.  I told her if she has worsening symptoms to let me know and I will put a longer-term monitor on.

## 2017-12-28 RX ORDER — LISINOPRIL AND HYDROCHLOROTHIAZIDE 20; 12.5 MG/1; MG/1
TABLET ORAL
Qty: 90 TABLET | Refills: 0 | Status: SHIPPED | OUTPATIENT
Start: 2017-12-28 | End: 2018-03-28 | Stop reason: SDUPTHER

## 2018-01-23 RX ORDER — DULOXETIN HYDROCHLORIDE 60 MG/1
CAPSULE, DELAYED RELEASE ORAL
Qty: 90 CAPSULE | Refills: 0 | Status: SHIPPED | OUTPATIENT
Start: 2018-01-23 | End: 2018-04-26 | Stop reason: SDUPTHER

## 2018-02-01 DIAGNOSIS — Z11.59 NEED FOR HEPATITIS C SCREENING TEST: Primary | ICD-10-CM

## 2018-02-01 DIAGNOSIS — E03.9 HYPOTHYROIDISM, UNSPECIFIED TYPE: ICD-10-CM

## 2018-02-01 DIAGNOSIS — E78.5 HYPERLIPIDEMIA, UNSPECIFIED HYPERLIPIDEMIA TYPE: ICD-10-CM

## 2018-02-01 DIAGNOSIS — Z79.899 LONG TERM USE OF DRUG: ICD-10-CM

## 2018-02-07 LAB
ALBUMIN SERPL-MCNC: 4.1 G/DL (ref 3.5–5.2)
ALBUMIN/GLOB SERPL: 1.5 G/DL
ALP SERPL-CCNC: 158 U/L (ref 39–117)
ALT SERPL-CCNC: 24 U/L (ref 1–33)
AST SERPL-CCNC: 17 U/L (ref 1–32)
BILIRUB SERPL-MCNC: 0.3 MG/DL (ref 0.1–1.2)
BUN SERPL-MCNC: 14 MG/DL (ref 8–23)
BUN/CREAT SERPL: 18.7 (ref 7–25)
CALCIUM SERPL-MCNC: 9.8 MG/DL (ref 8.6–10.5)
CHLORIDE SERPL-SCNC: 100 MMOL/L (ref 98–107)
CHOLEST SERPL-MCNC: 151 MG/DL (ref 0–200)
CO2 SERPL-SCNC: 27.9 MMOL/L (ref 22–29)
CREAT SERPL-MCNC: 0.75 MG/DL (ref 0.57–1)
GFR SERPLBLD CREATININE-BSD FMLA CKD-EPI: 79 ML/MIN/1.73
GFR SERPLBLD CREATININE-BSD FMLA CKD-EPI: 95 ML/MIN/1.73
GLOBULIN SER CALC-MCNC: 2.7 GM/DL
GLUCOSE SERPL-MCNC: 94 MG/DL (ref 65–99)
HCV AB S/CO SERPL IA: 0.1 S/CO RATIO (ref 0–0.9)
HDLC SERPL-MCNC: 68 MG/DL (ref 40–60)
LDLC SERPL CALC-MCNC: 66 MG/DL (ref 0–100)
LDLC/HDLC SERPL: 0.96 {RATIO}
POTASSIUM SERPL-SCNC: 4.7 MMOL/L (ref 3.5–5.2)
PROT SERPL-MCNC: 6.8 G/DL (ref 6–8.5)
SODIUM SERPL-SCNC: 139 MMOL/L (ref 136–145)
T4 FREE SERPL-MCNC: 1.23 NG/DL (ref 0.93–1.7)
TRIGL SERPL-MCNC: 87 MG/DL (ref 0–150)
TSH SERPL DL<=0.005 MIU/L-ACNC: 0.2 MIU/ML (ref 0.27–4.2)
VLDLC SERPL CALC-MCNC: 17.4 MG/DL (ref 5–40)

## 2018-02-13 ENCOUNTER — OFFICE VISIT (OUTPATIENT)
Dept: FAMILY MEDICINE CLINIC | Facility: CLINIC | Age: 63
End: 2018-02-13

## 2018-02-13 VITALS
HEART RATE: 74 BPM | RESPIRATION RATE: 16 BRPM | DIASTOLIC BLOOD PRESSURE: 66 MMHG | OXYGEN SATURATION: 100 % | WEIGHT: 187 LBS | SYSTOLIC BLOOD PRESSURE: 110 MMHG | BODY MASS INDEX: 31.92 KG/M2 | TEMPERATURE: 97.7 F | HEIGHT: 64 IN

## 2018-02-13 DIAGNOSIS — E03.9 HYPOTHYROIDISM, UNSPECIFIED TYPE: Primary | ICD-10-CM

## 2018-02-13 DIAGNOSIS — M65.341 TRIGGER RING FINGER OF RIGHT HAND: ICD-10-CM

## 2018-02-13 DIAGNOSIS — I10 ESSENTIAL HYPERTENSION: ICD-10-CM

## 2018-02-13 PROCEDURE — 99214 OFFICE O/P EST MOD 30 MIN: CPT | Performed by: INTERNAL MEDICINE

## 2018-02-13 RX ORDER — FLUTICASONE PROPIONATE 50 MCG
2 SPRAY, SUSPENSION (ML) NASAL DAILY
COMMUNITY
End: 2020-08-24

## 2018-02-13 RX ORDER — INFLUENZA A VIRUS A/MICHIGAN/45/2015 X-275 (H1N1) ANTIGEN (FORMALDEHYDE INACTIVATED), INFLUENZA A VIRUS A/SINGAPORE/INFIMH-16-0019/2016 IVR-186 (H3N2) ANTIGEN (FORMALDEHYDE INACTIVATED), INFLUENZA B VIRUS B/PHUKET/3073/2013 ANTIGEN (FORMALDEHYDE INACTIVATED), AND INFLUENZA B VIRUS B/MARYLAND/15/2016 BX-69A ANTIGEN (FORMALDEHYDE INACTIVATED) 15; 15; 15; 15 UG/.5ML; UG/.5ML; UG/.5ML; UG/.5ML
INJECTION, SUSPENSION INTRAMUSCULAR
COMMUNITY
Start: 2017-12-12 | End: 2019-03-08

## 2018-02-13 RX ORDER — LEVOTHYROXINE SODIUM 88 UG/1
88 TABLET ORAL DAILY
Qty: 30 TABLET | Refills: 11 | Status: SHIPPED | OUTPATIENT
Start: 2018-02-13 | End: 2020-03-04 | Stop reason: SDUPTHER

## 2018-02-13 RX ORDER — LEVOTHYROXINE SODIUM 88 UG/1
88 TABLET ORAL DAILY
Qty: 30 TABLET | Refills: 11 | Status: SHIPPED | OUTPATIENT
Start: 2018-02-13 | End: 2018-12-06 | Stop reason: SDUPTHER

## 2018-02-13 NOTE — PROGRESS NOTES
Subjective   Candy Garcia is a 62 y.o. female. Patient is here today for   Chief Complaint   Patient presents with   • Follow-up     HYPOTHYROIDISM           Vitals:    02/13/18 0836   BP: 110/66   Pulse: 74   Resp: 16   Temp: 97.7 °F (36.5 °C)   SpO2: 100%       Past Medical History:   Diagnosis Date   • Acute pansinusitis    • Asthma    • Depression    • Disease of thyroid gland    • GERD (gastroesophageal reflux disease)    • Hip pain, acute, left    • Hypercholesterolemia    • Hyperlipidemia    • Hypertension    • Hypothyroidism    • Irritable bowel syndrome 1/19/2016   • Raynaud's phenomenon    • Right-sided lacunar infarction 12/1/2016   • Stroke    • Tremor 1/19/2016      Allergies   Allergen Reactions   • Penicillins Anaphylaxis     Occurred when pt was 16 years old      Social History     Social History   • Marital status:      Spouse name: N/A   • Number of children: N/A   • Years of education: N/A     Occupational History   • Not on file.     Social History Main Topics   • Smoking status: Never Smoker   • Smokeless tobacco: Never Used   • Alcohol use Yes      Comment: daily caffiene   • Drug use: Defer   • Sexual activity: Defer     Other Topics Concern   • Not on file     Social History Narrative        Current Outpatient Prescriptions:   •  fluticasone (FLONASE) 50 MCG/ACT nasal spray, 2 sprays into each nostril Daily., Disp: , Rfl:   •  albuterol (PROVENTIL HFA;VENTOLIN HFA) 108 (90 BASE) MCG/ACT inhaler, Inhale 1-2 puffs every 4 (four) hours as needed for wheezing., Disp: , Rfl:   •  aspirin  MG tablet, Take 1 tablet by mouth Daily., Disp: 90 tablet, Rfl: 1  •  atorvastatin (LIPITOR) 20 MG tablet, Take 1 tablet by mouth Daily., Disp: 90 tablet, Rfl: 1  •  cetirizine (zyrTEC) 5 MG tablet, Take 5 mg by mouth Daily., Disp: , Rfl:   •  DULoxetine (CYMBALTA) 60 MG capsule, TAKE ONE CAPSULE BY MOUTH DAILY, Disp: 90 capsule, Rfl: 0  •  FLUZONE QUADRIVALENT 0.5 ML suspension prefilled syringe  injection, , Disp: , Rfl:   •  Halcinonide 0.1 % cream, Apply topically. Apply inch.  PRN, Disp: , Rfl:   •  levothyroxine (SYNTHROID) 88 MCG tablet, Take 1 tablet by mouth Daily., Disp: 30 tablet, Rfl: 11  •  levothyroxine (SYNTHROID) 88 MCG tablet, Take 1 tablet by mouth Daily., Disp: 30 tablet, Rfl: 11  •  lisinopril-hydrochlorothiazide (PRINZIDE,ZESTORETIC) 20-12.5 MG per tablet, TAKE ONE TABLET BY MOUTH DAILY, Disp: 90 tablet, Rfl: 0  •  omeprazole (PriLOSEC) 20 MG capsule, Take 2 tablets by mouth every morning., Disp: , Rfl:      Objective     HPI Comments: She has pain and has felt a lump underneath the head of the fifth digit on the right foot.  This is been present for a bit more than a year but is hurt a little bit more recently.    She is noticed some triggering of her ring finger in her right hand.    She is also here to follow-up on her hypothyroidism.       Review of Systems   Constitutional: Negative.    HENT: Negative.    Eyes: Negative.    Respiratory: Negative.    Cardiovascular: Negative.    Musculoskeletal:        She has pain in the right foot as described in the history of present illness.   Psychiatric/Behavioral: Negative.        Physical Exam   Constitutional: She is oriented to person, place, and time. She appears well-developed and well-nourished.   Pleasant, neatly groomed, BMI 32.   HENT:   Head: Normocephalic and atraumatic.   Pulmonary/Chest: Effort normal.   Musculoskeletal:   She has slight tenderness at the head of the right fifth metatarsal.  There is a mobile firm subcutaneous lump in this area.  It is a sesamoid bone.    She has some triggering of the ring finger in her right hand.   Neurological: She is alert and oriented to person, place, and time.   Psychiatric: She has a normal mood and affect. Her behavior is normal.   Nursing note and vitals reviewed.        Problem List Items Addressed This Visit        Cardiovascular and Mediastinum    Essential hypertension        Endocrine    Hypothyroidism - Primary    Relevant Medications    levothyroxine (SYNTHROID) 88 MCG tablet    levothyroxine (SYNTHROID) 88 MCG tablet       Musculoskeletal and Integument    Trigger ring finger of right hand            PLAN  She has some pain at the head of the fifth metatarsal on her right foot.  The pain is still a problem for her.  I asked her let me know if it became enough of a problem and she wished to do something about it.    She has triggering of the ring finger of the right hand.  She does not wish to do anything about this at this time but I told her this may get worse over time.  She will let me know.    Her hypertension is well-controlled.    She's taking just a little too much thyroid hormone replacement.  I'm going to decrease her dose to 88 µg daily and then have her back in a couple months to recheck a TSH and free T4.  No Follow-up on file.

## 2018-03-21 DIAGNOSIS — E03.9 HYPOTHYROIDISM, UNSPECIFIED TYPE: Primary | ICD-10-CM

## 2018-03-28 RX ORDER — LISINOPRIL AND HYDROCHLOROTHIAZIDE 20; 12.5 MG/1; MG/1
TABLET ORAL
Qty: 90 TABLET | Refills: 0 | Status: SHIPPED | OUTPATIENT
Start: 2018-03-28 | End: 2018-06-25 | Stop reason: SDUPTHER

## 2018-04-03 LAB
T4 FREE SERPL-MCNC: 1.23 NG/DL (ref 0.93–1.7)
TSH SERPL DL<=0.005 MIU/L-ACNC: 0.26 MIU/ML (ref 0.27–4.2)

## 2018-04-10 ENCOUNTER — OFFICE VISIT (OUTPATIENT)
Dept: FAMILY MEDICINE CLINIC | Facility: CLINIC | Age: 63
End: 2018-04-10

## 2018-04-10 VITALS
DIASTOLIC BLOOD PRESSURE: 88 MMHG | BODY MASS INDEX: 32.47 KG/M2 | WEIGHT: 190.2 LBS | HEIGHT: 64 IN | RESPIRATION RATE: 16 BRPM | SYSTOLIC BLOOD PRESSURE: 130 MMHG | HEART RATE: 74 BPM | TEMPERATURE: 99 F | OXYGEN SATURATION: 99 %

## 2018-04-10 DIAGNOSIS — E03.9 HYPOTHYROIDISM, UNSPECIFIED TYPE: Primary | ICD-10-CM

## 2018-04-10 DIAGNOSIS — I10 ESSENTIAL HYPERTENSION: ICD-10-CM

## 2018-04-10 PROCEDURE — 99213 OFFICE O/P EST LOW 20 MIN: CPT | Performed by: INTERNAL MEDICINE

## 2018-04-10 NOTE — PROGRESS NOTES
Subjective   Candy Garcia is a 62 y.o. female. Patient is here today for   Chief Complaint   Patient presents with   • Follow-up     hypertension           Vitals:    04/10/18 0918   BP: 130/88   Pulse: 74   Resp: 16   Temp: 99 °F (37.2 °C)   SpO2: 99%       Past Medical History:   Diagnosis Date   • Acute pansinusitis    • Asthma    • Depression    • Disease of thyroid gland    • GERD (gastroesophageal reflux disease)    • Hip pain, acute, left    • Hypercholesterolemia    • Hyperlipidemia    • Hypertension    • Hypothyroidism    • Irritable bowel syndrome 1/19/2016   • Raynaud's phenomenon    • Right-sided lacunar infarction 12/1/2016   • Stroke    • Tremor 1/19/2016      Allergies   Allergen Reactions   • Penicillins Anaphylaxis     Occurred when pt was 16 years old      Social History     Social History   • Marital status:      Spouse name: N/A   • Number of children: N/A   • Years of education: N/A     Occupational History   • Not on file.     Social History Main Topics   • Smoking status: Never Smoker   • Smokeless tobacco: Never Used   • Alcohol use Yes      Comment: daily caffiene   • Drug use: Unknown   • Sexual activity: Defer     Other Topics Concern   • Not on file     Social History Narrative   • No narrative on file        Current Outpatient Prescriptions:   •  albuterol (PROVENTIL HFA;VENTOLIN HFA) 108 (90 BASE) MCG/ACT inhaler, Inhale 1-2 puffs every 4 (four) hours as needed for wheezing., Disp: , Rfl:   •  aspirin  MG tablet, Take 1 tablet by mouth Daily., Disp: 90 tablet, Rfl: 1  •  atorvastatin (LIPITOR) 20 MG tablet, Take 1 tablet by mouth Daily., Disp: 90 tablet, Rfl: 1  •  cetirizine (zyrTEC) 5 MG tablet, Take 5 mg by mouth Daily., Disp: , Rfl:   •  DULoxetine (CYMBALTA) 60 MG capsule, TAKE ONE CAPSULE BY MOUTH DAILY, Disp: 90 capsule, Rfl: 0  •  fluticasone (FLONASE) 50 MCG/ACT nasal spray, 2 sprays into each nostril Daily., Disp: , Rfl:   •  FLUZONE QUADRIVALENT 0.5 ML suspension  prefilled syringe injection, , Disp: , Rfl:   •  Halcinonide 0.1 % cream, Apply topically. Apply inch.  PRN, Disp: , Rfl:   •  levothyroxine (SYNTHROID) 88 MCG tablet, Take 1 tablet by mouth Daily., Disp: 30 tablet, Rfl: 11  •  levothyroxine (SYNTHROID) 88 MCG tablet, Take 1 tablet by mouth Daily., Disp: 30 tablet, Rfl: 11  •  lisinopril-hydrochlorothiazide (PRINZIDE,ZESTORETIC) 20-12.5 MG per tablet, TAKE ONE TABLET BY MOUTH DAILY, Disp: 90 tablet, Rfl: 0  •  omeprazole (PriLOSEC) 20 MG capsule, Take 2 tablets by mouth every morning., Disp: , Rfl:      Objective     She is here to follow-up on hypertension and hypothyroidism.             Review of Systems   Constitutional: Negative.    HENT: Negative.    Respiratory: Negative.    Cardiovascular: Negative.    Musculoskeletal: Negative.    Psychiatric/Behavioral: Negative.        Physical Exam   Constitutional: She is oriented to person, place, and time. She appears well-developed and well-nourished.   HENT:   Head: Normocephalic and atraumatic.   Pulmonary/Chest: Effort normal.   Neurological: She is alert and oriented to person, place, and time.   Psychiatric: She has a normal mood and affect. Her behavior is normal.   Nursing note and vitals reviewed.        Problem List Items Addressed This Visit        Cardiovascular and Mediastinum    Essential hypertension       Endocrine    Hypothyroidism - Primary      Other Visit Diagnoses    None.           PLAN  She I reviewed her labs.  She has appropriate replacement hypothyroidism.    Her hypertension is well-controlled.    Like to see her back in 6 months.  She should have following labs prior to that visit: Lipid profile, comprehensive metabolic panel, TSH, free T4, CBC and urinalysis.  No Follow-up on file.

## 2018-04-26 RX ORDER — DULOXETIN HYDROCHLORIDE 60 MG/1
CAPSULE, DELAYED RELEASE ORAL
Qty: 90 CAPSULE | Refills: 0 | Status: SHIPPED | OUTPATIENT
Start: 2018-04-26 | End: 2018-07-20 | Stop reason: SDUPTHER

## 2018-05-22 DIAGNOSIS — E78.5 HYPERLIPIDEMIA, UNSPECIFIED HYPERLIPIDEMIA TYPE: ICD-10-CM

## 2018-05-22 RX ORDER — ATORVASTATIN CALCIUM 20 MG/1
TABLET, FILM COATED ORAL
Qty: 90 TABLET | Refills: 0 | Status: SHIPPED | OUTPATIENT
Start: 2018-05-22 | End: 2018-08-20 | Stop reason: SDUPTHER

## 2018-06-07 ENCOUNTER — APPOINTMENT (OUTPATIENT)
Dept: WOMENS IMAGING | Facility: HOSPITAL | Age: 63
End: 2018-06-07

## 2018-06-07 PROCEDURE — 77067 SCR MAMMO BI INCL CAD: CPT | Performed by: RADIOLOGY

## 2018-06-07 PROCEDURE — 77063 BREAST TOMOSYNTHESIS BI: CPT | Performed by: RADIOLOGY

## 2018-06-26 RX ORDER — LISINOPRIL AND HYDROCHLOROTHIAZIDE 20; 12.5 MG/1; MG/1
TABLET ORAL
Qty: 90 TABLET | Refills: 0 | Status: SHIPPED | OUTPATIENT
Start: 2018-06-26 | End: 2018-09-19 | Stop reason: SDUPTHER

## 2018-06-26 RX ORDER — ASPIRIN 325 MG
TABLET, DELAYED RELEASE (ENTERIC COATED) ORAL
Qty: 90 TABLET | Refills: 0 | Status: SHIPPED | OUTPATIENT
Start: 2018-06-26 | End: 2018-09-19 | Stop reason: SDUPTHER

## 2018-07-19 ENCOUNTER — TRANSCRIBE ORDERS (OUTPATIENT)
Dept: ADMINISTRATIVE | Facility: HOSPITAL | Age: 63
End: 2018-07-19

## 2018-07-19 DIAGNOSIS — M25.552 HIP PAIN, LEFT: Primary | ICD-10-CM

## 2018-07-23 RX ORDER — DULOXETIN HYDROCHLORIDE 60 MG/1
CAPSULE, DELAYED RELEASE ORAL
Qty: 90 CAPSULE | Refills: 0 | Status: SHIPPED | OUTPATIENT
Start: 2018-07-23 | End: 2018-10-16 | Stop reason: SDUPTHER

## 2018-08-03 ENCOUNTER — OFFICE VISIT (OUTPATIENT)
Dept: FAMILY MEDICINE CLINIC | Facility: CLINIC | Age: 63
End: 2018-08-03

## 2018-08-03 VITALS
RESPIRATION RATE: 16 BRPM | WEIGHT: 191 LBS | SYSTOLIC BLOOD PRESSURE: 120 MMHG | OXYGEN SATURATION: 98 % | TEMPERATURE: 98.2 F | BODY MASS INDEX: 32.61 KG/M2 | DIASTOLIC BLOOD PRESSURE: 80 MMHG | HEIGHT: 64 IN | HEART RATE: 73 BPM

## 2018-08-03 DIAGNOSIS — M19.90 ARTHRITIS: Primary | ICD-10-CM

## 2018-08-03 PROCEDURE — 99213 OFFICE O/P EST LOW 20 MIN: CPT | Performed by: INTERNAL MEDICINE

## 2018-08-06 LAB
ANA SER QL: NEGATIVE
BASOPHILS # BLD AUTO: 0.02 10*3/MM3 (ref 0–0.2)
BASOPHILS NFR BLD AUTO: 0.3 % (ref 0–1.5)
CRP SERPL-MCNC: 0.22 MG/DL (ref 0–0.5)
EOSINOPHIL # BLD AUTO: 0.13 10*3/MM3 (ref 0–0.7)
EOSINOPHIL NFR BLD AUTO: 1.8 % (ref 0.3–6.2)
ERYTHROCYTE [DISTWIDTH] IN BLOOD BY AUTOMATED COUNT: 14.7 % (ref 11.7–13)
ERYTHROCYTE [SEDIMENTATION RATE] IN BLOOD BY WESTERGREN METHOD: 6 MM/HR (ref 0–30)
HCT VFR BLD AUTO: 42.7 % (ref 35.6–45.5)
HGB BLD-MCNC: 13.1 G/DL (ref 11.9–15.5)
IMM GRANULOCYTES # BLD: 0.01 10*3/MM3 (ref 0–0.03)
IMM GRANULOCYTES NFR BLD: 0.1 % (ref 0–0.5)
LYMPHOCYTES # BLD AUTO: 1.5 10*3/MM3 (ref 0.9–4.8)
LYMPHOCYTES NFR BLD AUTO: 20.5 % (ref 19.6–45.3)
MCH RBC QN AUTO: 27.2 PG (ref 26.9–32)
MCHC RBC AUTO-ENTMCNC: 30.7 G/DL (ref 32.4–36.3)
MCV RBC AUTO: 88.6 FL (ref 80.5–98.2)
MONOCYTES # BLD AUTO: 0.39 10*3/MM3 (ref 0.2–1.2)
MONOCYTES NFR BLD AUTO: 5.3 % (ref 5–12)
NEUTROPHILS # BLD AUTO: 5.28 10*3/MM3 (ref 1.9–8.1)
NEUTROPHILS NFR BLD AUTO: 72.1 % (ref 42.7–76)
PLATELET # BLD AUTO: 282 10*3/MM3 (ref 140–500)
RBC # BLD AUTO: 4.82 10*6/MM3 (ref 3.9–5.2)
RHEUMATOID FACT SERPL-ACNC: <10 IU/ML (ref 0–13.9)
URATE SERPL-MCNC: 5.9 MG/DL (ref 2.4–5.7)
WBC # BLD AUTO: 7.32 10*3/MM3 (ref 4.5–10.7)

## 2018-08-07 ENCOUNTER — TRANSCRIBE ORDERS (OUTPATIENT)
Dept: ADMINISTRATIVE | Facility: HOSPITAL | Age: 63
End: 2018-08-07

## 2018-08-07 DIAGNOSIS — M25.552 LEFT HIP PAIN: Primary | ICD-10-CM

## 2018-08-07 PROBLEM — M19.90 ARTHRITIS: Status: ACTIVE | Noted: 2018-08-07

## 2018-08-07 NOTE — PROGRESS NOTES
Subjective   Candy Garcia is a 63 y.o. female. Patient is here today for   Chief Complaint   Patient presents with   • Hand Pain     pt states has been since april and more consistance both sides    • Knee Pain   • Elbow Pain   • Hip Pain          Vitals:    08/03/18 1426   BP: 120/80   Pulse: 73   Resp: 16   Temp: 98.2 °F (36.8 °C)   SpO2: 98%       Past Medical History:   Diagnosis Date   • Acute pansinusitis    • Arthritis 8/7/2018   • Asthma    • Depression    • Disease of thyroid gland    • GERD (gastroesophageal reflux disease)    • Hip pain, acute, left    • Hypercholesterolemia    • Hyperlipidemia    • Hypertension    • Hypothyroidism    • Irritable bowel syndrome 1/19/2016   • Raynaud's phenomenon    • Right-sided lacunar infarction (CMS/HCC) 12/1/2016   • Stroke (CMS/[facility])    • Tremor 1/19/2016      Allergies   Allergen Reactions   • Penicillins Anaphylaxis     Occurred when pt was 16 years old      Social History     Social History   • Marital status:      Spouse name: N/A   • Number of children: N/A   • Years of education: N/A     Occupational History   • Not on file.     Social History Main Topics   • Smoking status: Never Smoker   • Smokeless tobacco: Never Used   • Alcohol use Yes      Comment: daily caffiene   • Drug use: Unknown   • Sexual activity: Defer     Other Topics Concern   • Not on file     Social History Narrative   • No narrative on file        Current Outpatient Prescriptions:   •  albuterol (PROVENTIL HFA;VENTOLIN HFA) 108 (90 BASE) MCG/ACT inhaler, Inhale 1-2 puffs every 4 (four) hours as needed for wheezing., Disp: , Rfl:   •  aspirin  MG tablet, Take 1 tablet by mouth Daily., Disp: 90 tablet, Rfl: 1  •  aspirin  MG tablet, TAKE ONE TABLET BY MOUTH DAILY, Disp: 90 tablet, Rfl: 0  •  atorvastatin (LIPITOR) 20 MG tablet, TAKE ONE TABLET BY MOUTH DAILY, Disp: 90 tablet, Rfl: 0  •  cetirizine (zyrTEC) 5 MG tablet, Take 5 mg by mouth Daily., Disp: , Rfl:   •  DULoxetine  (CYMBALTA) 60 MG capsule, TAKE ONE CAPSULE BY MOUTH DAILY, Disp: 90 capsule, Rfl: 0  •  fluticasone (FLONASE) 50 MCG/ACT nasal spray, 2 sprays into each nostril Daily., Disp: , Rfl:   •  FLUZONE QUADRIVALENT 0.5 ML suspension prefilled syringe injection, , Disp: , Rfl:   •  Halcinonide 0.1 % cream, Apply topically. Apply inch.  PRN, Disp: , Rfl:   •  levothyroxine (SYNTHROID) 88 MCG tablet, Take 1 tablet by mouth Daily., Disp: 30 tablet, Rfl: 11  •  levothyroxine (SYNTHROID) 88 MCG tablet, Take 1 tablet by mouth Daily., Disp: 30 tablet, Rfl: 11  •  lisinopril-hydrochlorothiazide (PRINZIDE,ZESTORETIC) 20-12.5 MG per tablet, TAKE ONE TABLET BY MOUTH DAILY, Disp: 90 tablet, Rfl: 0  •  omeprazole (PriLOSEC) 20 MG capsule, Take 2 tablets by mouth every morning., Disp: , Rfl:      Objective     She has hand pain, knee pain, elbow pain, and hip pain.      Hand Pain      Knee Pain      Elbow Pain     Hip Pain           Review of Systems   Constitutional: Negative.    HENT: Negative.    Respiratory: Negative.    Cardiovascular: Negative.    Musculoskeletal:        Pain in her hands primarily in the bases of both thumbs.   Neurological: Negative.    Psychiatric/Behavioral: Negative.        Physical Exam   Constitutional: She is oriented to person, place, and time. She appears well-developed and well-nourished.   Pleasant, neatly groomed, BMI 32.   HENT:   Head: Normocephalic and atraumatic.   Pulmonary/Chest: Effort normal and breath sounds normal.   Musculoskeletal:   She had no changes in her hands suggestive of a symmetrical polyarthritis.  She had some enlargement of the joints consistent with degenerative arthritis.   Neurological: She is alert and oriented to person, place, and time.   Psychiatric: She has a normal mood and affect. Her behavior is normal.   Nursing note and vitals reviewed.        Problem List Items Addressed This Visit        Musculoskeletal and Integument    Arthritis - Primary    Relevant Orders     Uric acid (Completed)    Rheumatoid factor    C-reactive protein (Completed)    MARLEEN (Completed)    Sedimentation rate, automated    CBC & Differential (Completed)            PLAN  She probably has a degenerative arthritis symmetrically affecting the hands, knees, hips etc.  screen her for connective tissue disorder.    She is appointment to follow-up with orthopedic surgery soon.      No Follow-up on file.

## 2018-08-10 ENCOUNTER — APPOINTMENT (OUTPATIENT)
Dept: GENERAL RADIOLOGY | Facility: HOSPITAL | Age: 63
End: 2018-08-10

## 2018-08-13 ENCOUNTER — HOSPITAL ENCOUNTER (OUTPATIENT)
Dept: NUCLEAR MEDICINE | Facility: HOSPITAL | Age: 63
Discharge: HOME | End: 2018-08-13
Attending: ORTHOPAEDIC SURGERY

## 2018-08-13 DIAGNOSIS — M25.552 LEFT HIP PAIN: ICD-10-CM

## 2018-08-13 PROCEDURE — 0 TECHNETIUM MEDRONATE KIT: Performed by: ORTHOPAEDIC SURGERY

## 2018-08-13 PROCEDURE — 78315 BONE IMAGING 3 PHASE: CPT

## 2018-08-13 PROCEDURE — A9503 TC99M MEDRONATE: HCPCS | Performed by: ORTHOPAEDIC SURGERY

## 2018-08-13 RX ORDER — TC 99M MEDRONATE 20 MG/10ML
20.3 INJECTION, POWDER, LYOPHILIZED, FOR SOLUTION INTRAVENOUS
Status: COMPLETED | OUTPATIENT
Start: 2018-08-13 | End: 2018-08-13

## 2018-08-13 RX ADMIN — Medication 20.3 MILLICURIE: at 09:10

## 2018-08-20 DIAGNOSIS — E78.5 HYPERLIPIDEMIA, UNSPECIFIED HYPERLIPIDEMIA TYPE: ICD-10-CM

## 2018-08-20 RX ORDER — ATORVASTATIN CALCIUM 20 MG/1
TABLET, FILM COATED ORAL
Qty: 90 TABLET | Refills: 0 | Status: SHIPPED | OUTPATIENT
Start: 2018-08-20 | End: 2018-11-20 | Stop reason: SDUPTHER

## 2018-08-27 ENCOUNTER — TELEPHONE (OUTPATIENT)
Dept: NEUROLOGY | Facility: CLINIC | Age: 63
End: 2018-08-27

## 2018-08-27 NOTE — TELEPHONE ENCOUNTER
----- Message from TARAS Goldstein sent at 8/22/2018 12:05 PM EDT -----  Printing this out now.   ----- Message -----  From: Rahel Knox MA  Sent: 8/15/2018   9:47 AM  To: TARAS Goldstein    Pt saw you 11/17 and she has been placed on your list for yr f/u in 11/18. She is having a revision of hip replacement with Dr. Rodrigues and needs a clearance letter. Phone# [phone] - [place] and Fax # [phone]. Thanks!

## 2018-08-28 ENCOUNTER — TRANSCRIBE ORDERS (OUTPATIENT)
Dept: ADMINISTRATIVE | Facility: HOSPITAL | Age: 63
End: 2018-08-28

## 2018-08-28 DIAGNOSIS — M25.552 LEFT HIP PAIN: Primary | ICD-10-CM

## 2018-09-06 ENCOUNTER — HOSPITAL ENCOUNTER (OUTPATIENT)
Dept: GENERAL RADIOLOGY | Facility: HOSPITAL | Age: 63
Discharge: HOME | End: 2018-09-06
Admitting: RADIOLOGY

## 2018-09-06 DIAGNOSIS — M25.552 LEFT HIP PAIN: ICD-10-CM

## 2018-09-06 LAB
APPEARANCE FLD: ABNORMAL
COLOR FLD: YELLOW
LYMPHOCYTES NFR FLD MANUAL: 32 %
MONOCYTES NFR FLD: 34 %
MONOS+MACROS NFR FLD: 30 %
NEUTROPHILS NFR FLD MANUAL: 4 %
RBC # FLD AUTO: 4200 /MM3
WBC # FLD: 38 /MM3

## 2018-09-06 PROCEDURE — 82945 GLUCOSE OTHER FLUID: CPT | Performed by: ORTHOPAEDIC SURGERY

## 2018-09-06 PROCEDURE — 25010000002 IOPAMIDOL 61 % SOLUTION: Performed by: RADIOLOGY

## 2018-09-06 PROCEDURE — 89051 BODY FLUID CELL COUNT: CPT | Performed by: ORTHOPAEDIC SURGERY

## 2018-09-06 PROCEDURE — 87070 CULTURE OTHR SPECIMN AEROBIC: CPT | Performed by: ORTHOPAEDIC SURGERY

## 2018-09-06 PROCEDURE — 87205 SMEAR GRAM STAIN: CPT | Performed by: ORTHOPAEDIC SURGERY

## 2018-09-06 PROCEDURE — 87015 SPECIMEN INFECT AGNT CONCNTJ: CPT | Performed by: ORTHOPAEDIC SURGERY

## 2018-09-06 PROCEDURE — 77002 NEEDLE LOCALIZATION BY XRAY: CPT

## 2018-09-06 RX ORDER — LIDOCAINE HYDROCHLORIDE 10 MG/ML
20 INJECTION, SOLUTION INFILTRATION; PERINEURAL ONCE
Status: COMPLETED | OUTPATIENT
Start: 2018-09-06 | End: 2018-09-06

## 2018-09-06 RX ORDER — BUPIVACAINE HYDROCHLORIDE 2.5 MG/ML
10 INJECTION, SOLUTION EPIDURAL; INFILTRATION; INTRACAUDAL ONCE
Status: COMPLETED | OUTPATIENT
Start: 2018-09-06 | End: 2018-09-06

## 2018-09-06 RX ADMIN — BUPIVACAINE HYDROCHLORIDE 3 ML: 2.5 INJECTION, SOLUTION EPIDURAL; INFILTRATION; INTRACAUDAL; PERINEURAL at 11:35

## 2018-09-06 RX ADMIN — IOPAMIDOL 1 ML: 612 INJECTION, SOLUTION INTRAVENOUS at 11:35

## 2018-09-06 RX ADMIN — LIDOCAINE HYDROCHLORIDE 1 ML: 10 INJECTION, SOLUTION INFILTRATION; PERINEURAL at 11:35

## 2018-09-07 LAB — REF LAB TEST METHOD: NORMAL

## 2018-09-11 LAB
BACTERIA FLD CULT: NORMAL
GRAM STN SPEC: NORMAL
GRAM STN SPEC: NORMAL

## 2018-09-19 RX ORDER — LISINOPRIL AND HYDROCHLOROTHIAZIDE 20; 12.5 MG/1; MG/1
TABLET ORAL
Qty: 90 TABLET | Refills: 0 | Status: SHIPPED | OUTPATIENT
Start: 2018-09-19 | End: 2018-12-26 | Stop reason: SDUPTHER

## 2018-09-19 RX ORDER — ASPIRIN 325 MG
TABLET, DELAYED RELEASE (ENTERIC COATED) ORAL
Qty: 90 TABLET | Refills: 0 | Status: SHIPPED | OUTPATIENT
Start: 2018-09-19 | End: 2018-12-06 | Stop reason: SDUPTHER

## 2018-10-03 ENCOUNTER — TELEPHONE (OUTPATIENT)
Dept: NEUROLOGY | Facility: CLINIC | Age: 63
End: 2018-10-03

## 2018-10-03 NOTE — TELEPHONE ENCOUNTER
I notified pt of general recommendations. 3 months event free and no longer than 5 days off ASA total. I let her know they can fax a request if needed as well.

## 2018-10-03 NOTE — TELEPHONE ENCOUNTER
----- Message from TARAS Goldstein sent at 10/2/2018  9:15 AM EDT -----  Contact: PT  Her surgeon is the one to ask about this, but our general recommendations apply. Thanks.   ----- Message -----  From: Rahel Knox MA  Sent: 10/1/2018   3:39 PM  To: TARAS Goldstein        ----- Message -----  From: Olegario Esposito  Sent: 10/1/2018   1:34 PM  To: Rahel Knox MA    Pt is having surgery 10/17/18, she takes 325 mg aspirin daily wants to know if it's ok for her to continue taking it up until time for surgery or does she need to stop prior to surgery.  Please call her [phone]

## 2018-10-08 DIAGNOSIS — D50.9 IRON DEFICIENCY ANEMIA, UNSPECIFIED IRON DEFICIENCY ANEMIA TYPE: ICD-10-CM

## 2018-10-08 DIAGNOSIS — I10 ESSENTIAL HYPERTENSION: ICD-10-CM

## 2018-10-08 DIAGNOSIS — E78.5 HYPERLIPIDEMIA LDL GOAL <70: Primary | ICD-10-CM

## 2018-10-08 DIAGNOSIS — E03.9 HYPOTHYROIDISM, UNSPECIFIED TYPE: ICD-10-CM

## 2018-10-09 LAB
ALBUMIN SERPL-MCNC: 4.3 G/DL (ref 3.5–5.2)
ALBUMIN/GLOB SERPL: 1.7 G/DL
ALP SERPL-CCNC: 162 U/L (ref 39–117)
ALT SERPL-CCNC: 23 U/L (ref 1–33)
APPEARANCE UR: CLEAR
AST SERPL-CCNC: 17 U/L (ref 1–32)
BACTERIA #/AREA URNS HPF: ABNORMAL /HPF
BASOPHILS # BLD AUTO: 0.03 10*3/MM3 (ref 0–0.2)
BASOPHILS NFR BLD AUTO: 0.6 % (ref 0–1.5)
BILIRUB SERPL-MCNC: 0.5 MG/DL (ref 0.1–1.2)
BILIRUB UR QL STRIP: NEGATIVE
BUN SERPL-MCNC: 16 MG/DL (ref 8–23)
BUN/CREAT SERPL: 20.5 (ref 7–25)
CALCIUM SERPL-MCNC: 10 MG/DL (ref 8.6–10.5)
CASTS URNS MICRO: ABNORMAL
CHLORIDE SERPL-SCNC: 99 MMOL/L (ref 98–107)
CHOLEST SERPL-MCNC: 161 MG/DL (ref 0–200)
CO2 SERPL-SCNC: 29.3 MMOL/L (ref 22–29)
COLOR UR: YELLOW
CREAT SERPL-MCNC: 0.78 MG/DL (ref 0.57–1)
EOSINOPHIL # BLD AUTO: 0.14 10*3/MM3 (ref 0–0.7)
EOSINOPHIL NFR BLD AUTO: 2.7 % (ref 0.3–6.2)
EPI CELLS #/AREA URNS HPF: ABNORMAL /HPF
ERYTHROCYTE [DISTWIDTH] IN BLOOD BY AUTOMATED COUNT: 14.5 % (ref 11.7–13)
GLOBULIN SER CALC-MCNC: 2.5 GM/DL
GLUCOSE SERPL-MCNC: 92 MG/DL (ref 65–99)
GLUCOSE UR QL: NEGATIVE
HCT VFR BLD AUTO: 43.4 % (ref 35.6–45.5)
HDLC SERPL-MCNC: 65 MG/DL (ref 40–60)
HGB BLD-MCNC: 12.4 G/DL (ref 11.9–15.5)
HGB UR QL STRIP: NEGATIVE
IMM GRANULOCYTES # BLD: 0 10*3/MM3 (ref 0–0.03)
IMM GRANULOCYTES NFR BLD: 0 % (ref 0–0.5)
IRON SATN MFR SERPL: 11 % (ref 20–50)
IRON SERPL-MCNC: 56 MCG/DL (ref 37–145)
KETONES UR QL STRIP: NEGATIVE
LDLC SERPL CALC-MCNC: 75 MG/DL (ref 0–100)
LDLC/HDLC SERPL: 1.16 {RATIO}
LEUKOCYTE ESTERASE UR QL STRIP: (no result)
LYMPHOCYTES # BLD AUTO: 1.52 10*3/MM3 (ref 0.9–4.8)
LYMPHOCYTES NFR BLD AUTO: 29.1 % (ref 19.6–45.3)
MCH RBC QN AUTO: 25.4 PG (ref 26.9–32)
MCHC RBC AUTO-ENTMCNC: 28.6 G/DL (ref 32.4–36.3)
MCV RBC AUTO: 88.9 FL (ref 80.5–98.2)
MONOCYTES # BLD AUTO: 0.23 10*3/MM3 (ref 0.2–1.2)
MONOCYTES NFR BLD AUTO: 4.4 % (ref 5–12)
NEUTROPHILS # BLD AUTO: 3.3 10*3/MM3 (ref 1.9–8.1)
NEUTROPHILS NFR BLD AUTO: 63.2 % (ref 42.7–76)
NITRITE UR QL STRIP: NEGATIVE
PH UR STRIP: 7.5 [PH] (ref 5–8)
PLATELET # BLD AUTO: 235 10*3/MM3 (ref 140–500)
POTASSIUM SERPL-SCNC: 5.1 MMOL/L (ref 3.5–5.2)
PROT SERPL-MCNC: 6.8 G/DL (ref 6–8.5)
PROT UR QL STRIP: NEGATIVE
RBC # BLD AUTO: 4.88 10*6/MM3 (ref 3.9–5.2)
RBC #/AREA URNS HPF: ABNORMAL /HPF
SODIUM SERPL-SCNC: 139 MMOL/L (ref 136–145)
SP GR UR: 1.02 (ref 1–1.03)
T4 FREE SERPL-MCNC: 1.06 NG/DL (ref 0.93–1.7)
TIBC SERPL-MCNC: 493 MCG/DL
TRIGL SERPL-MCNC: 103 MG/DL (ref 0–150)
TSH SERPL DL<=0.005 MIU/L-ACNC: 2.3 MIU/ML (ref 0.27–4.2)
UIBC SERPL-MCNC: 437 MCG/DL
UROBILINOGEN UR STRIP-MCNC: (no result) MG/DL
VLDLC SERPL CALC-MCNC: 20.6 MG/DL (ref 5–40)
WBC # BLD AUTO: 5.22 10*3/MM3 (ref 4.5–10.7)
WBC #/AREA URNS HPF: ABNORMAL /HPF

## 2018-10-16 ENCOUNTER — TELEPHONE (OUTPATIENT)
Dept: FAMILY MEDICINE CLINIC | Facility: CLINIC | Age: 63
End: 2018-10-16

## 2018-10-16 RX ORDER — NITROFURANTOIN 25; 75 MG/1; MG/1
100 CAPSULE ORAL 2 TIMES DAILY
Qty: 14 CAPSULE | Refills: 0 | Status: SHIPPED | OUTPATIENT
Start: 2018-10-16 | End: 2018-12-06

## 2018-10-16 NOTE — TELEPHONE ENCOUNTER
CALLED PT AND ADVISED HER PER DR. CORONA SHE HAS A UTI SENT IN MACROBID 100MG #14 BID PO FOR 7 DAYS. ALL OTHER LABS WHERE NORMAL. PT UNDERSTOOD.   ----- Message from Yareli Durán sent at 10/16/2018  9:05 AM EDT -----  PT WAS LATE TO HR APPT AND WAS TOLD TO RS AND SHE IS HAVING SURGERY TOMORROW AND IS WANTING TO KNOW IF HER LABS WERE OK SINCE SHE IS HAVING SURGERY AND WONT BE ABLE TO COME BACK FOR 8 WEEKS OR SO     PLEASE CALL TO DISCUSS WITH PT     [phone]

## 2018-10-17 RX ORDER — DULOXETIN HYDROCHLORIDE 60 MG/1
CAPSULE, DELAYED RELEASE ORAL
Qty: 90 CAPSULE | Refills: 0 | Status: SHIPPED | OUTPATIENT
Start: 2018-10-17 | End: 2019-01-21 | Stop reason: SDUPTHER

## 2018-10-26 ENCOUNTER — TELEPHONE (OUTPATIENT)
Dept: FAMILY MEDICINE CLINIC | Facility: CLINIC | Age: 63
End: 2018-10-26

## 2018-10-26 NOTE — TELEPHONE ENCOUNTER
CALLED PT LM ADVISED HER PER DR. CORONA SHE WOULD NEED TO DROP OFF A URINE OR HAVE SOMEONE BRING IN A URINE FOR HER, BUT WE WOULD NOT BE ABLE TO TELL WHETHER SHE NEEDED TO TAKE THE NITROFURRANTOIN OR NOT. HE ALSO WENT ON TO STATE IF SHE FELT OK OR DIDN'T HAVE ANY SYMPTOMS SHE DIDN'T NEED TO TAKE IT. PT WILL CALL BACK WITH ANY QUESTIONS   ----- Message from Yareli Durán sent at 10/25/2018  2:12 PM EDT -----  Wants to know if she should take the antibiotic( nitro fura toin ) dr claire gave her after she is finished with her antibiotic from surgery      She will be finished with her clyndimycin tonight     Please call pt to let her know     [phone]

## 2018-11-20 DIAGNOSIS — E78.5 HYPERLIPIDEMIA, UNSPECIFIED HYPERLIPIDEMIA TYPE: ICD-10-CM

## 2018-11-20 RX ORDER — ATORVASTATIN CALCIUM 20 MG/1
TABLET, FILM COATED ORAL
Qty: 90 TABLET | Refills: 2 | Status: SHIPPED | OUTPATIENT
Start: 2018-11-20 | End: 2019-02-04 | Stop reason: SINTOL

## 2018-12-06 ENCOUNTER — OFFICE VISIT (OUTPATIENT)
Dept: NEUROLOGY | Facility: CLINIC | Age: 63
End: 2018-12-06

## 2018-12-06 VITALS
SYSTOLIC BLOOD PRESSURE: 138 MMHG | BODY MASS INDEX: 32.78 KG/M2 | WEIGHT: 192 LBS | DIASTOLIC BLOOD PRESSURE: 88 MMHG | HEIGHT: 64 IN

## 2018-12-06 DIAGNOSIS — E78.5 HYPERLIPIDEMIA LDL GOAL <70: ICD-10-CM

## 2018-12-06 DIAGNOSIS — I63.20 CEREBROVASCULAR ACCIDENT (CVA) DUE TO STENOSIS OF PRECEREBRAL ARTERY (HCC): Primary | ICD-10-CM

## 2018-12-06 DIAGNOSIS — I10 ESSENTIAL HYPERTENSION: ICD-10-CM

## 2018-12-06 PROCEDURE — 99214 OFFICE O/P EST MOD 30 MIN: CPT | Performed by: NURSE PRACTITIONER

## 2018-12-06 NOTE — PROGRESS NOTES
DOS: 2018  NAME: Candy Garcia   : 1955  PCP: Maulik Lagos MD    Chief Complaint   Patient presents with   • Stroke      SUBJECTIVE  Neurological Problem:  63 y.o. female with HTN, Hypothyroid and Raynaud's who presents today for stroke follow-up. She is unaccompanied.     Interval History:   Ms. Garcia initially presented to [facility] on 16 with left-sided numbness and tingling. MRI brain showed a right thalamic lacunar stroke. Vessel imaging was negative for any significant stenosis and transthoracic echocardiogram was unremarkable for cardioembolic source. The etiology was thought to be hypertension; however the patient was on estrogen therapy at the time therefore a hypercoaguability could not be excluded. She has since discontinue her premarin. She was not on any antiplatelets or statin therapy at the time of her event. She was discharged home on  mg and Lipitor 40 mg.      She presents today and continues on ASA and Lipitor 20 mg and is tolerating well.  She has remained at her neurologic baseline.  She did have a distribution of her left hip replacement done in October of this year with some residual left leg weakness and general deconditioning.  She was on Xarelto perioperatively.  She denies any recurrent numbness/tingling or any new stroke/TIA symptoms.  She follows up regularly with her PCP for cholesterol surveillance.  She takes her blood pressure regularly and states it is well-controlled.  She denies smoking.  Rare alcohol use.    Review of Systems:Review of Systems   Constitutional: Negative for activity change, appetite change and fatigue.   HENT: Negative for facial swelling, hearing loss and trouble swallowing.    Eyes: Negative for photophobia, pain and visual disturbance.   Respiratory: Negative for choking, chest tightness and shortness of breath.    Cardiovascular: Negative for chest pain, palpitations and leg swelling.   Gastrointestinal: Negative for abdominal pain,  nausea and vomiting.   Endocrine: Negative for polydipsia and polyphagia.   Musculoskeletal: Negative for back pain, gait problem and neck pain.   Neurological: Negative for dizziness, tremors, seizures, syncope, facial asymmetry, speech difficulty, weakness, light-headedness, numbness and headaches.   Hematological: Negative for adenopathy. Bruises/bleeds easily.   Psychiatric/Behavioral: Negative for agitation, behavioral problems, confusion, decreased concentration, dysphoric mood, hallucinations, self-injury, sleep disturbance and suicidal ideas. The patient is not nervous/anxious and is not hyperactive.        Current Medications:   Current Outpatient Medications:   •  albuterol (PROVENTIL HFA;VENTOLIN HFA) 108 (90 BASE) MCG/ACT inhaler, Inhale 1-2 puffs every 4 (four) hours as needed for wheezing., Disp: , Rfl:   •  aspirin  MG tablet, Take 1 tablet by mouth Daily., Disp: 90 tablet, Rfl: 1  •  atorvastatin (LIPITOR) 20 MG tablet, TAKE ONE TABLET BY MOUTH DAILY, Disp: 90 tablet, Rfl: 2  •  cetirizine (zyrTEC) 5 MG tablet, Take 5 mg by mouth Daily., Disp: , Rfl:   •  DULoxetine (CYMBALTA) 60 MG capsule, TAKE ONE CAPSULE BY MOUTH DAILY, Disp: 90 capsule, Rfl: 0  •  fluticasone (FLONASE) 50 MCG/ACT nasal spray, 2 sprays into each nostril Daily., Disp: , Rfl:   •  levothyroxine (SYNTHROID) 88 MCG tablet, Take 1 tablet by mouth Daily., Disp: 30 tablet, Rfl: 11  •  lisinopril-hydrochlorothiazide (PRINZIDE,ZESTORETIC) 20-12.5 MG per tablet, TAKE ONE TABLET BY MOUTH DAILY, Disp: 90 tablet, Rfl: 0  •  omeprazole (PriLOSEC) 20 MG capsule, Take 2 tablets by mouth every morning., Disp: , Rfl:   •  FLUZONE QUADRIVALENT 0.5 ML suspension prefilled syringe injection, , Disp: , Rfl:   •  Halcinonide 0.1 % cream, Apply topically. Apply inch.  PRN, Disp: , Rfl:     The following portions of the patient's history were reviewed and updated as appropriate: allergies, current medications, past family history, past medical  history, past social history, past surgical history and problem list.    OBJECTIVE  Diagnostics:  EKG 9/28/18 (Source: [facility]): SR    Laboratory Results:         Lab Results   Component Value Date    WBC 4.87 07/25/2017    HGB 12.4 10/09/2018    HCT 43.4 10/09/2018    MCV 88.9 10/09/2018     10/09/2018     Lab Results   Component Value Date    GLUCOSE 96 11/29/2016    BUN 16 10/09/2018    CREATININE 0.78 10/09/2018    EGFRIFNONA 75 10/09/2018    EGFRIFAFRI 91 10/09/2018    BCR 20.5 10/09/2018    K 5.1 10/09/2018    CO2 29.3 (H) 10/09/2018    CALCIUM 10.0 10/09/2018    PROTENTOTREF 6.8 10/09/2018    ALBUMIN 4.30 10/09/2018    LABIL2 1.7 10/09/2018    AST 17 10/09/2018    ALT 23 10/09/2018     Lab Results   Component Value Date    HGBA1C 5.10 11/30/2016     Lab Results   Component Value Date    CHOL 167 11/30/2016     Lab Results   Component Value Date    HDL 65 (H) 10/09/2018    HDL 68 (H) 02/06/2018    HDL 60 07/25/2017     Lab Results   Component Value Date    LDL 75 10/09/2018    LDL 66 02/06/2018    LDL 76 07/25/2017     Lab Results   Component Value Date    TRIG 103 10/09/2018    TRIG 87 02/06/2018    TRIG 145 07/25/2017     No results found for: RPR  Lab Results   Component Value Date    TSH 2.300 10/09/2018       Lab Results   Component Value Date    SEDRATE 6 08/03/2018     Lab Results   Component Value Date    CRP 0.51 (H) 11/29/2016      Ref Range & Units 4mo ago   MARLEEN Direct Negative Negative      MARLEEN Comprehensive Panel   Order: [id number]   Status:  Final result   Visible to patient:  Yes (MyChart)    Ref Range & Units 2yr ago   Anti-DNA (DS) Ab Qn 0 - 9 IU/mL <1    Comment:                                    Negative      <5                                      Equivocal  5 - 9                                      Positive      >9   RNP Antibodies 0.0 - 0.9 AI <0.2    Healy Antibodies 0.0 - 0.9 AI <0.2    Antiscleroderma-70 Antibodies 0.0 - 0.9 AI <0.2    JOYCE SSA (RO) Ab 0.0 - 0.9 AI 0.6    JOYCE  SSB (LA) Ab 0.0 - 0.9 AI <0.2    Antichromatin Antibodies 0.0 - 0.9 AI 0.2    ROSA-1 IgG 0.0 - 0.9 AI <0.2    Anti-Centromere B Antibodies 0.0 - 0.9 AI <0.2    See below:  Comment              No results found for: [id number]    Physical Examination: NIHSS: 0 mRS: 0  General Appearance:   Well developed, well nourished, well groomed, alert, and cooperative.  HEENT: Normocephalic.    Neck and Spine: Normal range of motion.  Normal alignment. No mass or tenderness. No bruits.  Cardiac: Regular rate and rhythm. No murmurs.  Peripheral Vasculature: Radial pulses are equal and symmetric. No signs of distal embolization.  Extremities:    No edema or deformities. Decreased ROM of left hip s/p replacement redo in Oct.  Skin:    No rashes or birth marks.    Neurological examination:  Higher Integrative  Function: Oriented to time, place and person. Normal registration, recall (3/3), attention span and concentration. Normal language including comprehension, spontaneous speech, repetition, reading, writing, naming and vocabulary. No neglect with normal visual-spatial function and construction. Normal fund of knowledge and higher integrative function.  CN II: Pupils are equal, round, and reactive to light. Normal visual acuity and visual fields.    CN III IV VI: Extraocular movements are full without nystagmus.   CN V: Normal facial sensation and strength of muscles of mastication.  CN VII: Facial movements are symmetric. No weakness.  CN VIII:   Auditory acuity is normal.  CN IX & X:   Symmetric palatal movement.  CN XI: Sternocleidomastoid and trapezius are normal.  No weakness.  CN XII:   The tongue is midline.  No atrophy or fasciculations.  Motor: Normal muscle strength, bulk and tone in upper and lower extremities.  No fasciculations, rigidity, spasticity, or abnormal movements.  Sensation: Normal to light touch, vibration, temperature, and proprioception in arms and legs. Normal graphesthesia and no extinction on  DSS.  Station and Gait: Normal gait and station.    Coordination: Finger to nose test shows no dysmetria. Heel to shin normal.    Impression:  Ms. Garcia is to do well following her right thalamic lacunar infarct she suffered 2 years ago is essentially remained at her neurologic baseline.  The etiology of her event was thought to be due to hypertension versus hypercoagulability secondary to estrogen use at the time.  She has been maintained on ASA and statin therapy with no recurrent events. We discussed at length her personal risk factors for stroke and the importance of risk factor control for stroke prevention, including BP and cholesterol control. She will monitor BP regularly and f/u with PCP for continued cholesterol surveillance.  Her most recent LDL is elevated above goal of less than 70; however, patient believes getting back to exercise after her hip surgery will help improve her results.  We discussed the signs and symptoms of stroke and the importance of calling 911 if she were to develop any of these.  Continue current medication regimen follow-up in one year, sooner if symptoms warrant.    Plan:     Continue current medication regimen  Monitor BP regularly and record  Consider increasing Lipitor if LDL not at goal  Secondary stroke prevention: Ideal targets for stroke prevention would be Blood pressure < 130/80; B12 > 500 TSH in normal range and LDL < 70; HbA1c < 6.5 and smoking cessation if applicable.    Call 911 for stroke symptoms  Follow-up in one year    I spent 25 minutes face to face with patient, with  > 50% spent counseling patient regarding diagnosis, review of diagnostics, personal risk factors for stroke and importance of risk factor control for stroke prevention.     Candy was seen today for stroke.    Diagnoses and all orders for this visit:    Cerebrovascular accident (CVA) due to stenosis of precerebral artery (CMS/HCC)    Essential hypertension    Hyperlipidemia LDL goal  <70        Coding

## 2018-12-07 ENCOUNTER — OFFICE VISIT (OUTPATIENT)
Dept: FAMILY MEDICINE CLINIC | Facility: CLINIC | Age: 63
End: 2018-12-07

## 2018-12-07 VITALS
WEIGHT: 193.2 LBS | HEIGHT: 64 IN | SYSTOLIC BLOOD PRESSURE: 124 MMHG | TEMPERATURE: 97.6 F | RESPIRATION RATE: 16 BRPM | BODY MASS INDEX: 32.98 KG/M2 | DIASTOLIC BLOOD PRESSURE: 72 MMHG

## 2018-12-07 DIAGNOSIS — I10 ESSENTIAL HYPERTENSION: ICD-10-CM

## 2018-12-07 DIAGNOSIS — R74.8 ELEVATED ALKALINE PHOSPHATASE LEVEL: Primary | ICD-10-CM

## 2018-12-07 PROCEDURE — 99213 OFFICE O/P EST LOW 20 MIN: CPT | Performed by: INTERNAL MEDICINE

## 2018-12-09 PROBLEM — R74.8 ELEVATED ALKALINE PHOSPHATASE LEVEL: Status: ACTIVE | Noted: 2018-12-09

## 2018-12-09 NOTE — PROGRESS NOTES
Subjective   Candy Garcia is a 63 y.o. female. Patient is here today for No chief complaint on file.         Vitals:    12/07/18 1452   BP: 124/72   Resp: 16   Temp: 97.6 °F (36.4 °C)       Past Medical History:   Diagnosis Date   • Acute pansinusitis    • Arthritis 8/7/2018   • Asthma    • Depression    • Disease of thyroid gland    • GERD (gastroesophageal reflux disease)    • Hip pain, acute, left    • Hypercholesterolemia    • Hyperlipidemia    • Hypertension    • Hypothyroidism    • Irritable bowel syndrome 1/19/2016   • Raynaud's phenomenon    • Right-sided lacunar infarction 12/1/2016   • Stroke (CMS/[facility])    • Tremor 1/19/2016      Allergies   Allergen Reactions   • Penicillins Anaphylaxis     Occurred when pt was 16 years old      Social History     Socioeconomic History   • Marital status:      Spouse name: Not on file   • Number of children: Not on file   • Years of education: Not on file   • Highest education level: Not on file   Social Needs   • Financial resource strain: Not on file   • Food insecurity - worry: Not on file   • Food insecurity - inability: Not on file   • Transportation needs - medical: Not on file   • Transportation needs - non-medical: Not on file   Occupational History   • Not on file   Tobacco Use   • Smoking status: Never Smoker   • Smokeless tobacco: Never Used   Substance and Sexual Activity   • Alcohol use: Yes     Comment: daily caffiene   • Drug use: Defer   • Sexual activity: Not Currently     Birth control/protection: Post-menopausal   Other Topics Concern   • Not on file   Social History Narrative   • Not on file        Current Outpatient Medications:   •  albuterol (PROVENTIL HFA;VENTOLIN HFA) 108 (90 BASE) MCG/ACT inhaler, Inhale 1-2 puffs every 4 (four) hours as needed for wheezing., Disp: , Rfl:   •  aspirin  MG tablet, Take 1 tablet by mouth Daily., Disp: 90 tablet, Rfl: 1  •  atorvastatin (LIPITOR) 20 MG tablet, TAKE ONE TABLET BY MOUTH DAILY, Disp: 90  tablet, Rfl: 2  •  cetirizine (zyrTEC) 5 MG tablet, Take 5 mg by mouth Daily., Disp: , Rfl:   •  DULoxetine (CYMBALTA) 60 MG capsule, TAKE ONE CAPSULE BY MOUTH DAILY, Disp: 90 capsule, Rfl: 0  •  fluticasone (FLONASE) 50 MCG/ACT nasal spray, 2 sprays into each nostril Daily., Disp: , Rfl:   •  FLUZONE QUADRIVALENT 0.5 ML suspension prefilled syringe injection, , Disp: , Rfl:   •  Halcinonide 0.1 % cream, Apply topically. Apply inch.  PRN, Disp: , Rfl:   •  levothyroxine (SYNTHROID) 88 MCG tablet, Take 1 tablet by mouth Daily., Disp: 30 tablet, Rfl: 11  •  lisinopril-hydrochlorothiazide (PRINZIDE,ZESTORETIC) 20-12.5 MG per tablet, TAKE ONE TABLET BY MOUTH DAILY, Disp: 90 tablet, Rfl: 0  •  omeprazole (PriLOSEC) 20 MG capsule, Take 2 tablets by mouth every morning., Disp: , Rfl:      Objective     She has surgery on her left hip this past October.  She feels that she is coming along well.    She was found to have elevated liver enzymes while in the hospital.    She tells me that she feels well.  She drinks alcohol very infrequently.         Review of Systems   Constitutional: Negative.    HENT: Negative.    Respiratory: Negative.    Cardiovascular: Negative.    Musculoskeletal: Negative.    Psychiatric/Behavioral: Negative.        Physical Exam   Constitutional: She is oriented to person, place, and time. She appears well-developed and well-nourished.   HENT:   Head: Normocephalic and atraumatic.   Pulmonary/Chest: Effort normal.   Neurological: She is alert and oriented to person, place, and time.   Psychiatric: She has a normal mood and affect. Her behavior is normal.   Nursing note and vitals reviewed.        Problem List Items Addressed This Visit        Cardiovascular and Mediastinum    Essential hypertension       Other    Elevated alkaline phosphatase level - Primary    Relevant Orders    Comprehensive metabolic panel    US abdomen limited    Gamma GT    Hepatitis B and C Profile            PLAN  Her  hypertension is well-controlled.    She has an elevated alkaline phosphatase and does not drink alcohol.  She is obese with a BMI of 33.  She may have a fatty liver.  A lingular right upper ultrasound, viral hepatitis panel, GGT, and repeat comprehensive metabolic panel.  No Follow-up on file.

## 2018-12-12 ENCOUNTER — RESULTS ENCOUNTER (OUTPATIENT)
Dept: FAMILY MEDICINE CLINIC | Facility: CLINIC | Age: 63
End: 2018-12-12

## 2018-12-12 DIAGNOSIS — R74.8 ELEVATED ALKALINE PHOSPHATASE LEVEL: ICD-10-CM

## 2018-12-14 ENCOUNTER — HOSPITAL ENCOUNTER (OUTPATIENT)
Dept: ULTRASOUND IMAGING | Facility: HOSPITAL | Age: 63
Discharge: HOME | End: 2018-12-14
Admitting: INTERNAL MEDICINE

## 2018-12-14 PROCEDURE — 76705 ECHO EXAM OF ABDOMEN: CPT

## 2018-12-19 ENCOUNTER — TELEPHONE (OUTPATIENT)
Dept: FAMILY MEDICINE CLINIC | Facility: CLINIC | Age: 63
End: 2018-12-19

## 2018-12-19 NOTE — TELEPHONE ENCOUNTER
Pt has been called and advised she can wait until March to get the following labs done.   ----- Message from Shandra Solano sent at 12/14/2018 11:32 AM EST -----  Contact: PT  [phone]  YOU SAW HER 12/7 AND SHE SAID YOU TALKED ABOUT HER HAVING  A HEP A & B SCREENING BUT SHE DID NOT UNDERSTAND WHEN SHE WAS TO HAVE THESE. SHE HAS SOME OTHER LABS SCHEDULED IN MARCH DOES SHE NEED TO WAIT UNTIL THEN? NICHOLE SAID I NEEDED TO ASK YOU DIRECTLY BECAUSE SHE CANNOT PUT IN THESE TESTS IF SHE NEEDS THEM.    WE NEED TO CALL HER BACK AND LET HE KNOW

## 2018-12-26 RX ORDER — LISINOPRIL AND HYDROCHLOROTHIAZIDE 20; 12.5 MG/1; MG/1
TABLET ORAL
Qty: 90 TABLET | Refills: 2 | Status: SHIPPED | OUTPATIENT
Start: 2018-12-26 | End: 2019-09-22 | Stop reason: SDUPTHER

## 2018-12-26 RX ORDER — ASPIRIN 325 MG
TABLET, DELAYED RELEASE (ENTERIC COATED) ORAL
Qty: 90 TABLET | Refills: 2 | Status: SHIPPED | OUTPATIENT
Start: 2018-12-26 | End: 2019-03-08

## 2019-01-22 RX ORDER — DULOXETIN HYDROCHLORIDE 60 MG/1
CAPSULE, DELAYED RELEASE ORAL
Qty: 90 CAPSULE | Refills: 2 | Status: SHIPPED | OUTPATIENT
Start: 2019-01-22 | End: 2019-09-20 | Stop reason: SDUPTHER

## 2019-02-04 ENCOUNTER — TELEPHONE (OUTPATIENT)
Dept: NEUROLOGY | Facility: CLINIC | Age: 64
End: 2019-02-04

## 2019-02-04 RX ORDER — ROSUVASTATIN CALCIUM 10 MG/1
10 TABLET, COATED ORAL NIGHTLY
Qty: 30 TABLET | Refills: 11 | Status: SHIPPED | OUTPATIENT
Start: 2019-02-04 | End: 2020-02-04

## 2019-02-04 NOTE — TELEPHONE ENCOUNTER
----- Message from Olegario Esposito sent at 2/4/2019  9:58 AM EST -----  Contact: PT  Pt would like to come off the Atorvastin 20 mg and switch to something new, says her legs kills her at night when trying to sleep, says she didn't take it last night and her legs didn't hurt.  She uses [facility] [phone].  Please call her at [phone]

## 2019-02-05 NOTE — TELEPHONE ENCOUNTER
Please let patient know that I sent in order for crestor 10 mg and also an order for Enzyme Co Q10 (which she could also get OTC) to help with leg cramps.

## 2019-02-15 NOTE — TELEPHONE ENCOUNTER
I spoke with patient and let her know Araceli called in crestor 10 mg and also an order for Enzyme Co Q10 (which she could also get OTC) to help with leg cramps.

## 2019-02-28 DIAGNOSIS — D50.9 IRON DEFICIENCY ANEMIA, UNSPECIFIED IRON DEFICIENCY ANEMIA TYPE: ICD-10-CM

## 2019-02-28 DIAGNOSIS — E03.9 HYPOTHYROIDISM, UNSPECIFIED TYPE: ICD-10-CM

## 2019-02-28 DIAGNOSIS — E78.5 HYPERLIPIDEMIA LDL GOAL <70: Primary | ICD-10-CM

## 2019-03-01 LAB
ALBUMIN SERPL-MCNC: 4.3 G/DL (ref 3.5–5.2)
ALBUMIN/GLOB SERPL: 1.7 G/DL
ALP SERPL-CCNC: 134 U/L (ref 39–117)
ALT SERPL-CCNC: 21 U/L (ref 1–33)
AST SERPL-CCNC: 15 U/L (ref 1–32)
BASOPHILS # BLD AUTO: 0.04 10*3/MM3 (ref 0–0.2)
BASOPHILS NFR BLD AUTO: 0.8 % (ref 0–1.5)
BILIRUB SERPL-MCNC: 0.3 MG/DL (ref 0.1–1.2)
BUN SERPL-MCNC: 13 MG/DL (ref 8–23)
BUN/CREAT SERPL: 18.3 (ref 7–25)
CALCIUM SERPL-MCNC: 10.1 MG/DL (ref 8.6–10.5)
CHLORIDE SERPL-SCNC: 102 MMOL/L (ref 98–107)
CHOLEST SERPL-MCNC: 135 MG/DL (ref 0–200)
CO2 SERPL-SCNC: 28 MMOL/L (ref 22–29)
CREAT SERPL-MCNC: 0.71 MG/DL (ref 0.57–1)
EOSINOPHIL # BLD AUTO: 0.15 10*3/MM3 (ref 0–0.4)
EOSINOPHIL NFR BLD AUTO: 3 % (ref 0.3–6.2)
ERYTHROCYTE [DISTWIDTH] IN BLOOD BY AUTOMATED COUNT: 17.2 % (ref 12.3–15.4)
GLOBULIN SER CALC-MCNC: 2.5 GM/DL
GLUCOSE SERPL-MCNC: 93 MG/DL (ref 65–99)
HCT VFR BLD AUTO: 39.4 % (ref 34–46.6)
HDLC SERPL-MCNC: 69 MG/DL (ref 40–60)
HGB BLD-MCNC: 10.9 G/DL (ref 12–15.9)
IMM GRANULOCYTES # BLD AUTO: 0.01 10*3/MM3 (ref 0–0.05)
IMM GRANULOCYTES NFR BLD AUTO: 0.2 % (ref 0–0.5)
IRON SATN MFR SERPL: 5 % (ref 20–50)
IRON SERPL-MCNC: 27 MCG/DL (ref 37–145)
LDLC SERPL CALC-MCNC: 51 MG/DL (ref 0–100)
LDLC/HDLC SERPL: 0.74 {RATIO}
LYMPHOCYTES # BLD AUTO: 1.1 10*3/MM3 (ref 0.7–3.1)
LYMPHOCYTES NFR BLD AUTO: 22.1 % (ref 19.6–45.3)
MCH RBC QN AUTO: 22 PG (ref 26.6–33)
MCHC RBC AUTO-ENTMCNC: 27.7 G/DL (ref 31.5–35.7)
MCV RBC AUTO: 79.4 FL (ref 79–97)
MONOCYTES # BLD AUTO: 0.27 10*3/MM3 (ref 0.1–0.9)
MONOCYTES NFR BLD AUTO: 5.4 % (ref 5–12)
NEUTROPHILS # BLD AUTO: 3.41 10*3/MM3 (ref 1.4–7)
NEUTROPHILS NFR BLD AUTO: 68.5 % (ref 42.7–76)
NRBC BLD AUTO-RTO: 0 /100 WBC (ref 0–0)
PLATELET # BLD AUTO: 301 10*3/MM3 (ref 140–450)
POTASSIUM SERPL-SCNC: 5.5 MMOL/L (ref 3.5–5.2)
PROT SERPL-MCNC: 6.8 G/DL (ref 6–8.5)
RBC # BLD AUTO: 4.96 10*6/MM3 (ref 3.77–5.28)
SODIUM SERPL-SCNC: 143 MMOL/L (ref 136–145)
T4 FREE SERPL-MCNC: 1.38 NG/DL (ref 0.93–1.7)
TIBC SERPL-MCNC: 495 MCG/DL
TRIGL SERPL-MCNC: 73 MG/DL (ref 0–150)
TSH SERPL DL<=0.005 MIU/L-ACNC: 2.49 MIU/ML (ref 0.27–4.2)
UIBC SERPL-MCNC: 468 MCG/DL
VLDLC SERPL CALC-MCNC: 14.6 MG/DL (ref 5–40)
WBC # BLD AUTO: 4.98 10*3/MM3 (ref 3.4–10.8)

## 2019-03-07 RX ORDER — LEVOTHYROXINE SODIUM 88 UG/1
TABLET ORAL
Qty: 30 TABLET | Refills: 10 | Status: SHIPPED | OUTPATIENT
Start: 2019-03-07 | End: 2019-03-08

## 2019-03-08 ENCOUNTER — OFFICE VISIT (OUTPATIENT)
Dept: FAMILY MEDICINE CLINIC | Facility: CLINIC | Age: 64
End: 2019-03-08

## 2019-03-08 VITALS
BODY MASS INDEX: 32.27 KG/M2 | HEIGHT: 64 IN | OXYGEN SATURATION: 98 % | HEART RATE: 81 BPM | DIASTOLIC BLOOD PRESSURE: 62 MMHG | WEIGHT: 189 LBS | SYSTOLIC BLOOD PRESSURE: 100 MMHG | TEMPERATURE: 98.7 F | RESPIRATION RATE: 16 BRPM

## 2019-03-08 DIAGNOSIS — E03.9 HYPOTHYROIDISM, UNSPECIFIED TYPE: ICD-10-CM

## 2019-03-08 DIAGNOSIS — I10 ESSENTIAL HYPERTENSION: ICD-10-CM

## 2019-03-08 DIAGNOSIS — E78.5 HYPERLIPIDEMIA LDL GOAL <70: ICD-10-CM

## 2019-03-08 DIAGNOSIS — E87.5 HYPERKALEMIA: Primary | ICD-10-CM

## 2019-03-08 LAB
ALBUMIN SERPL-MCNC: 4.5 G/DL (ref 3.5–5.2)
ALBUMIN/GLOB SERPL: 1.9 G/DL
ALP SERPL-CCNC: 146 U/L (ref 39–117)
ALT SERPL-CCNC: 20 U/L (ref 1–33)
AST SERPL-CCNC: 19 U/L (ref 1–32)
BILIRUB SERPL-MCNC: 0.4 MG/DL (ref 0.1–1.2)
BUN SERPL-MCNC: 15 MG/DL (ref 8–23)
BUN/CREAT SERPL: 18.1 (ref 7–25)
CALCIUM SERPL-MCNC: 10.2 MG/DL (ref 8.6–10.5)
CHLORIDE SERPL-SCNC: 105 MMOL/L (ref 98–107)
CO2 SERPL-SCNC: 27.3 MMOL/L (ref 22–29)
CREAT SERPL-MCNC: 0.83 MG/DL (ref 0.57–1)
GLOBULIN SER CALC-MCNC: 2.4 GM/DL
GLUCOSE SERPL-MCNC: 87 MG/DL (ref 65–99)
POTASSIUM SERPL-SCNC: 5.1 MMOL/L (ref 3.5–5.2)
PROT SERPL-MCNC: 6.9 G/DL (ref 6–8.5)
SODIUM SERPL-SCNC: 143 MMOL/L (ref 136–145)

## 2019-03-08 PROCEDURE — 99214 OFFICE O/P EST MOD 30 MIN: CPT | Performed by: INTERNAL MEDICINE

## 2019-03-10 NOTE — PROGRESS NOTES
Subjective   Candy Garcia is a 63 y.o. female. Patient is here today for   Chief Complaint   Patient presents with   • Follow-up     hypothyroidism          Vitals:    03/08/19 1006   BP: 100/62   Pulse: 81   Resp: 16   Temp: 98.7 °F (37.1 °C)   SpO2: 98%       Past Medical History:   Diagnosis Date   • Acute pansinusitis    • Arthritis 8/7/2018   • Asthma    • Depression    • Disease of thyroid gland    • GERD (gastroesophageal reflux disease)    • Hip pain, acute, left    • Hypercholesterolemia    • Hyperlipidemia    • Hypertension    • Hypothyroidism    • Irritable bowel syndrome 1/19/2016   • Raynaud's phenomenon    • Right-sided lacunar infarction 12/1/2016   • Stroke (CMS/HCC)    • Tremor 1/19/2016      Allergies   Allergen Reactions   • Penicillins Anaphylaxis     Occurred when pt was 16 years old      Social History     Socioeconomic History   • Marital status:      Spouse name: Not on file   • Number of children: Not on file   • Years of education: Not on file   • Highest education level: Not on file   Social Needs   • Financial resource strain: Not on file   • Food insecurity - worry: Not on file   • Food insecurity - inability: Not on file   • Transportation needs - medical: Not on file   • Transportation needs - non-medical: Not on file   Occupational History   • Not on file   Tobacco Use   • Smoking status: Never Smoker   • Smokeless tobacco: Never Used   Substance and Sexual Activity   • Alcohol use: Yes     Comment: daily caffiene   • Drug use: Defer   • Sexual activity: Not Currently     Birth control/protection: Post-menopausal   Other Topics Concern   • Not on file   Social History Narrative   • Not on file        Current Outpatient Medications:   •  albuterol (PROVENTIL HFA;VENTOLIN HFA) 108 (90 BASE) MCG/ACT inhaler, Inhale 1-2 puffs every 4 (four) hours as needed for wheezing., Disp: , Rfl:   •  aspirin  MG tablet, Take 1 tablet by mouth Daily., Disp: 90 tablet, Rfl: 1  •   cetirizine (zyrTEC) 5 MG tablet, Take 5 mg by mouth Daily., Disp: , Rfl:   •  co-enzyme Q-10 50 MG capsule, Take 1 capsule by mouth Daily., Disp: 30 capsule, Rfl: 11  •  DULoxetine (CYMBALTA) 60 MG capsule, TAKE ONE CAPSULE BY MOUTH DAILY, Disp: 90 capsule, Rfl: 2  •  fluticasone (FLONASE) 50 MCG/ACT nasal spray, 2 sprays into each nostril Daily., Disp: , Rfl:   •  Halcinonide 0.1 % cream, Apply topically. Apply inch.  PRN, Disp: , Rfl:   •  levothyroxine (SYNTHROID) 88 MCG tablet, Take 1 tablet by mouth Daily., Disp: 30 tablet, Rfl: 11  •  lisinopril-hydrochlorothiazide (PRINZIDE,ZESTORETIC) 20-12.5 MG per tablet, TAKE ONE TABLET BY MOUTH DAILY, Disp: 90 tablet, Rfl: 2  •  omeprazole (PriLOSEC) 20 MG capsule, Take 2 tablets by mouth every morning., Disp: , Rfl:   •  rosuvastatin (CRESTOR) 10 MG tablet, Take 1 tablet by mouth Every Night., Disp: 30 tablet, Rfl: 11     Objective     She is here to follow-up on hypothyroidism, hypercholesterolemia, hypertension and labs from last week    She claims to feel well.         Review of Systems   Constitutional: Negative.    HENT: Negative.    Cardiovascular: Negative.    Musculoskeletal: Negative.    Psychiatric/Behavioral: Negative.        Physical Exam   Constitutional: She appears well-developed and well-nourished.   Pleasant, neatly groomed, BMI 32.   HENT:   Head: Normocephalic and atraumatic.   Pulmonary/Chest: Effort normal.   Neurological: She is alert.   Psychiatric: She has a normal mood and affect. Her behavior is normal.   Nursing note and vitals reviewed.        Problem List Items Addressed This Visit        Cardiovascular and Mediastinum    Essential hypertension    Hyperlipidemia LDL goal <70       Endocrine    Hypothyroidism      Other Visit Diagnoses     Hyperkalemia    -  Primary    Relevant Orders    Comprehensive Metabolic Panel (Completed)            PLAN  Her hypertension is well controlled.    Her hypercholesterolemia LDL goal is less than 70 which  she has achieved.    Her hypothyroidism is appropriately replaced.    She had an elevated potassium, I would recheck that today.    Follow-up 6 months or so.    No Follow-up on file.

## 2019-06-14 ENCOUNTER — APPOINTMENT (OUTPATIENT)
Dept: WOMENS IMAGING | Facility: HOSPITAL | Age: 64
End: 2019-06-14

## 2019-06-14 PROCEDURE — 77063 BREAST TOMOSYNTHESIS BI: CPT | Performed by: RADIOLOGY

## 2019-06-14 PROCEDURE — 77067 SCR MAMMO BI INCL CAD: CPT | Performed by: RADIOLOGY

## 2019-06-14 PROCEDURE — 77080 DXA BONE DENSITY AXIAL: CPT | Performed by: RADIOLOGY

## 2019-06-14 PROCEDURE — MDREVIEWSP: Performed by: RADIOLOGY

## 2019-09-16 ENCOUNTER — TELEPHONE (OUTPATIENT)
Dept: NEUROLOGY | Facility: CLINIC | Age: 64
End: 2019-09-16

## 2019-09-16 NOTE — TELEPHONE ENCOUNTER
----- Message from Angel Schilling sent at 9/16/2019  1:00 PM EDT -----  Contact: [phone]  Candy was calling because she has been stressed lately and her blood pressure has been running 150/75. She stated that her small finger has been going numb. She is going to her PCP tomorrow. She is wanting to know if her blood pressure could be sign of a stroke. Candy can be reached at [phone] or [phone]. Thanks so much.

## 2019-09-17 ENCOUNTER — OFFICE VISIT (OUTPATIENT)
Dept: FAMILY MEDICINE CLINIC | Facility: CLINIC | Age: 64
End: 2019-09-17

## 2019-09-17 VITALS
WEIGHT: 187.4 LBS | SYSTOLIC BLOOD PRESSURE: 114 MMHG | HEART RATE: 71 BPM | RESPIRATION RATE: 16 BRPM | TEMPERATURE: 98.1 F | BODY MASS INDEX: 31.99 KG/M2 | HEIGHT: 64 IN | OXYGEN SATURATION: 100 % | DIASTOLIC BLOOD PRESSURE: 64 MMHG

## 2019-09-17 DIAGNOSIS — Z23 NEED FOR VACCINATION: Primary | ICD-10-CM

## 2019-09-17 DIAGNOSIS — I10 ESSENTIAL HYPERTENSION: ICD-10-CM

## 2019-09-17 PROCEDURE — 99213 OFFICE O/P EST LOW 20 MIN: CPT | Performed by: INTERNAL MEDICINE

## 2019-09-17 PROCEDURE — 90471 IMMUNIZATION ADMIN: CPT | Performed by: INTERNAL MEDICINE

## 2019-09-17 PROCEDURE — 90674 CCIIV4 VAC NO PRSV 0.5 ML IM: CPT | Performed by: INTERNAL MEDICINE

## 2019-09-17 NOTE — PROGRESS NOTES
Subjective   Candy Garcia is a 64 y.o. female. Patient is here today for   Chief Complaint   Patient presents with   • Follow-up     blood pressure check           Vitals:    09/17/19 0810   BP: 114/64   Pulse: 71   Resp: 16   Temp: 98.1 °F (36.7 °C)   SpO2: 100%       Past Medical History:   Diagnosis Date   • Acute pansinusitis    • Arthritis 8/7/2018   • Asthma    • Depression    • Disease of thyroid gland    • GERD (gastroesophageal reflux disease)    • Hip pain, acute, left    • Hypercholesterolemia    • Hyperlipidemia    • Hypertension    • Hypothyroidism    • Irritable bowel syndrome 1/19/2016   • Raynaud's phenomenon    • Right-sided lacunar infarction 12/1/2016   • Stroke (CMS/HCC)    • Tremor 1/19/2016      Allergies   Allergen Reactions   • Penicillins Anaphylaxis     Occurred when pt was 16 years old      Social History     Socioeconomic History   • Marital status:      Spouse name: Not on file   • Number of children: Not on file   • Years of education: Not on file   • Highest education level: Not on file   Tobacco Use   • Smoking status: Never Smoker   • Smokeless tobacco: Never Used   Substance and Sexual Activity   • Alcohol use: Yes     Comment: daily caffiene   • Drug use: Defer   • Sexual activity: Not Currently     Birth control/protection: Post-menopausal        Current Outpatient Medications:   •  albuterol (PROVENTIL HFA;VENTOLIN HFA) 108 (90 BASE) MCG/ACT inhaler, Inhale 1-2 puffs every 4 (four) hours as needed for wheezing., Disp: , Rfl:   •  aspirin  MG tablet, Take 1 tablet by mouth Daily., Disp: 90 tablet, Rfl: 1  •  cetirizine (zyrTEC) 5 MG tablet, Take 5 mg by mouth Daily., Disp: , Rfl:   •  co-enzyme Q-10 50 MG capsule, Take 1 capsule by mouth Daily., Disp: 30 capsule, Rfl: 11  •  DULoxetine (CYMBALTA) 60 MG capsule, TAKE ONE CAPSULE BY MOUTH DAILY, Disp: 90 capsule, Rfl: 2  •  fluticasone (FLONASE) 50 MCG/ACT nasal spray, 2 sprays into each nostril Daily., Disp: , Rfl:   •   Halcinonide 0.1 % cream, Apply topically. Apply inch.  PRN, Disp: , Rfl:   •  levothyroxine (SYNTHROID) 88 MCG tablet, Take 1 tablet by mouth Daily., Disp: 30 tablet, Rfl: 11  •  lisinopril-hydrochlorothiazide (PRINZIDE,ZESTORETIC) 20-12.5 MG per tablet, TAKE ONE TABLET BY MOUTH DAILY, Disp: 90 tablet, Rfl: 2  •  omeprazole (PriLOSEC) 20 MG capsule, Take 2 tablets by mouth every morning., Disp: , Rfl:   •  rosuvastatin (CRESTOR) 10 MG tablet, Take 1 tablet by mouth Every Night., Disp: 30 tablet, Rfl: 11  •  triamcinolone (KENALOG) 0.1 % cream, , Disp: , Rfl:      Objective     She is here today to follow-up on hypertension.    She has no complaints today.  She was concerned yesterday when she found that her blood pressure was high and then she had some tingling and numbness in the small finger of the left hand.  This has since resolved.  Been under some stress recently.  Her 29-year-old son-in-law was recently diagnosed with stage IV colon cancer.         Review of Systems   Constitutional: Negative.    HENT: Negative.    Respiratory: Negative.    Cardiovascular: Negative.    Musculoskeletal: Negative.    Psychiatric/Behavioral: Negative.        Physical Exam   Constitutional: She is oriented to person, place, and time. She appears well-developed and well-nourished.   Pleasant, neatly groomed, weight.   HENT:   Head: Normocephalic and atraumatic.   Cardiovascular: Normal rate.   Pulmonary/Chest: Effort normal and breath sounds normal.   Neurological: She is alert and oriented to person, place, and time.   Psychiatric: She has a normal mood and affect. Her behavior is normal. Judgment and thought content normal.   Nursing note and vitals reviewed.        Problem List Items Addressed This Visit     None      Visit Diagnoses     Need for vaccination    -  Primary    Relevant Orders    Flucelvax Quad=>4Years ([id number]) (Completed)            PLAN  Her hypertension is well controlled.    I asked her to follow-up in  about 3 months with fasting labs a week before to include lipid, comprehensive metabolic panel, CBC.    We gave her a flu shot today.  No Follow-up on file.

## 2019-09-18 NOTE — TELEPHONE ENCOUNTER
I have attempted to contact this patient by phone: no answer, left message to return my call on answering machine, I will continue to try later.

## 2019-09-19 NOTE — TELEPHONE ENCOUNTER
Pt was on prednisone for a cough;   She states per PCP, that is probably what caused BP to rise. She says she feels normal now.

## 2019-09-20 RX ORDER — DULOXETIN HYDROCHLORIDE 60 MG/1
CAPSULE, DELAYED RELEASE ORAL
Qty: 30 CAPSULE | Refills: 1 | Status: SHIPPED | OUTPATIENT
Start: 2019-09-20 | End: 2019-12-10 | Stop reason: SDUPTHER

## 2019-09-23 RX ORDER — LISINOPRIL AND HYDROCHLOROTHIAZIDE 20; 12.5 MG/1; MG/1
TABLET ORAL
Qty: 30 TABLET | Refills: 1 | Status: SHIPPED | OUTPATIENT
Start: 2019-09-23 | End: 2019-12-10 | Stop reason: SDUPTHER

## 2019-09-23 RX ORDER — ASPIRIN 325 MG
TABLET, DELAYED RELEASE (ENTERIC COATED) ORAL
Qty: 90 TABLET | Refills: 1 | Status: SHIPPED | OUTPATIENT
Start: 2019-09-23

## 2019-10-07 RX ORDER — DICYCLOMINE HYDROCHLORIDE 10 MG/1
10 CAPSULE ORAL
Qty: 100 CAPSULE | Refills: 2 | Status: SHIPPED | OUTPATIENT
Start: 2019-10-07 | End: 2020-10-01

## 2019-11-15 ENCOUNTER — APPOINTMENT (OUTPATIENT)
Dept: GENERAL RADIOLOGY | Facility: HOSPITAL | Age: 64
End: 2019-11-15

## 2019-11-15 PROCEDURE — 73590 X-RAY EXAM OF LOWER LEG: CPT | Performed by: EMERGENCY MEDICINE

## 2019-12-10 ENCOUNTER — OFFICE VISIT (OUTPATIENT)
Dept: NEUROLOGY | Facility: CLINIC | Age: 64
End: 2019-12-10

## 2019-12-10 VITALS
HEART RATE: 74 BPM | BODY MASS INDEX: 33.8 KG/M2 | WEIGHT: 198 LBS | HEIGHT: 64 IN | SYSTOLIC BLOOD PRESSURE: 104 MMHG | DIASTOLIC BLOOD PRESSURE: 72 MMHG | OXYGEN SATURATION: 99 %

## 2019-12-10 DIAGNOSIS — I10 ESSENTIAL HYPERTENSION: ICD-10-CM

## 2019-12-10 DIAGNOSIS — I63.20 CEREBROVASCULAR ACCIDENT (CVA) DUE TO STENOSIS OF PRECEREBRAL ARTERY (HCC): Primary | ICD-10-CM

## 2019-12-10 DIAGNOSIS — E78.5 HYPERLIPIDEMIA LDL GOAL <70: ICD-10-CM

## 2019-12-10 PROCEDURE — 99214 OFFICE O/P EST MOD 30 MIN: CPT | Performed by: NURSE PRACTITIONER

## 2019-12-10 RX ORDER — LISINOPRIL AND HYDROCHLOROTHIAZIDE 20; 12.5 MG/1; MG/1
TABLET ORAL
Qty: 30 TABLET | Refills: 1 | Status: SHIPPED | OUTPATIENT
Start: 2019-12-10 | End: 2020-02-03

## 2019-12-10 RX ORDER — DULOXETIN HYDROCHLORIDE 60 MG/1
CAPSULE, DELAYED RELEASE ORAL
Qty: 30 CAPSULE | Refills: 1 | Status: SHIPPED | OUTPATIENT
Start: 2019-12-10 | End: 2020-02-03

## 2019-12-10 RX ORDER — ASCORBIC ACID 500 MG
1000 TABLET ORAL DAILY
COMMUNITY
End: 2020-08-24
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
## 2019-12-13 DIAGNOSIS — E78.5 HYPERLIPIDEMIA LDL GOAL <70: ICD-10-CM

## 2019-12-13 DIAGNOSIS — I10 ESSENTIAL HYPERTENSION: Primary | ICD-10-CM

## 2019-12-13 DIAGNOSIS — E87.5 HYPERKALEMIA: ICD-10-CM

## 2019-12-13 DIAGNOSIS — D50.9 IRON DEFICIENCY ANEMIA, UNSPECIFIED IRON DEFICIENCY ANEMIA TYPE: ICD-10-CM

## 2019-12-13 LAB
ALBUMIN SERPL-MCNC: 4.5 G/DL (ref 3.5–5.2)
ALBUMIN/GLOB SERPL: 1.8 G/DL
ALP SERPL-CCNC: 128 U/L (ref 39–117)
ALT SERPL-CCNC: 20 U/L (ref 1–33)
AST SERPL-CCNC: 22 U/L (ref 1–32)
BASOPHILS # BLD AUTO: 0.04 10*3/MM3 (ref 0–0.2)
BASOPHILS NFR BLD AUTO: 0.9 % (ref 0–1.5)
BILIRUB SERPL-MCNC: 0.5 MG/DL (ref 0.2–1.2)
BUN SERPL-MCNC: 11 MG/DL (ref 8–23)
BUN/CREAT SERPL: 13.9 (ref 7–25)
CALCIUM SERPL-MCNC: 9.6 MG/DL (ref 8.6–10.5)
CHLORIDE SERPL-SCNC: 99 MMOL/L (ref 98–107)
CHOLEST SERPL-MCNC: 147 MG/DL (ref 0–200)
CO2 SERPL-SCNC: 30.3 MMOL/L (ref 22–29)
CREAT SERPL-MCNC: 0.79 MG/DL (ref 0.57–1)
EOSINOPHIL # BLD AUTO: 0.13 10*3/MM3 (ref 0–0.4)
EOSINOPHIL NFR BLD AUTO: 2.8 % (ref 0.3–6.2)
ERYTHROCYTE [DISTWIDTH] IN BLOOD BY AUTOMATED COUNT: 15.8 % (ref 12.3–15.4)
GLOBULIN SER CALC-MCNC: 2.5 GM/DL
GLUCOSE SERPL-MCNC: 95 MG/DL (ref 65–99)
HCT VFR BLD AUTO: 35.9 % (ref 34–46.6)
HDLC SERPL-MCNC: 77 MG/DL (ref 40–60)
HGB BLD-MCNC: 10.5 G/DL (ref 12–15.9)
IMM GRANULOCYTES # BLD AUTO: 0.01 10*3/MM3 (ref 0–0.05)
IMM GRANULOCYTES NFR BLD AUTO: 0.2 % (ref 0–0.5)
IRON SATN MFR SERPL: 7 % (ref 20–50)
IRON SERPL-MCNC: 38 MCG/DL (ref 37–145)
LDLC SERPL CALC-MCNC: 56 MG/DL (ref 0–100)
LDLC/HDLC SERPL: 0.72 {RATIO}
LYMPHOCYTES # BLD AUTO: 1.02 10*3/MM3 (ref 0.7–3.1)
LYMPHOCYTES NFR BLD AUTO: 22.3 % (ref 19.6–45.3)
MCH RBC QN AUTO: 22.4 PG (ref 26.6–33)
MCHC RBC AUTO-ENTMCNC: 29.2 G/DL (ref 31.5–35.7)
MCV RBC AUTO: 76.7 FL (ref 79–97)
MONOCYTES # BLD AUTO: 0.29 10*3/MM3 (ref 0.1–0.9)
MONOCYTES NFR BLD AUTO: 6.3 % (ref 5–12)
NEUTROPHILS # BLD AUTO: 3.08 10*3/MM3 (ref 1.7–7)
NEUTROPHILS NFR BLD AUTO: 67.5 % (ref 42.7–76)
NRBC BLD AUTO-RTO: 0 /100 WBC (ref 0–0.2)
PLATELET # BLD AUTO: 312 10*3/MM3 (ref 140–450)
POTASSIUM SERPL-SCNC: 4.9 MMOL/L (ref 3.5–5.2)
PROT SERPL-MCNC: 7 G/DL (ref 6–8.5)
RBC # BLD AUTO: 4.68 10*6/MM3 (ref 3.77–5.28)
SODIUM SERPL-SCNC: 140 MMOL/L (ref 136–145)
TIBC SERPL-MCNC: 527 MCG/DL
TRIGL SERPL-MCNC: 71 MG/DL (ref 0–150)
UIBC SERPL-MCNC: 489 MCG/DL (ref 112–346)
VLDLC SERPL CALC-MCNC: 14.2 MG/DL
WBC # BLD AUTO: 4.57 10*3/MM3 (ref 3.4–10.8)

## 2019-12-17 ENCOUNTER — OFFICE VISIT (OUTPATIENT)
Dept: FAMILY MEDICINE CLINIC | Facility: CLINIC | Age: 64
End: 2019-12-17

## 2019-12-17 VITALS
TEMPERATURE: 98.7 F | HEART RATE: 74 BPM | WEIGHT: 192.4 LBS | BODY MASS INDEX: 32.85 KG/M2 | RESPIRATION RATE: 18 BRPM | SYSTOLIC BLOOD PRESSURE: 120 MMHG | OXYGEN SATURATION: 98 % | HEIGHT: 64 IN | DIASTOLIC BLOOD PRESSURE: 84 MMHG

## 2019-12-17 DIAGNOSIS — D50.9 IRON DEFICIENCY ANEMIA, UNSPECIFIED IRON DEFICIENCY ANEMIA TYPE: ICD-10-CM

## 2019-12-17 DIAGNOSIS — I10 ESSENTIAL HYPERTENSION: Primary | ICD-10-CM

## 2019-12-17 DIAGNOSIS — E78.5 HYPERLIPIDEMIA LDL GOAL <70: ICD-10-CM

## 2019-12-17 DIAGNOSIS — E03.9 HYPOTHYROIDISM, UNSPECIFIED TYPE: ICD-10-CM

## 2019-12-17 PROCEDURE — 99214 OFFICE O/P EST MOD 30 MIN: CPT | Performed by: INTERNAL MEDICINE

## 2019-12-22 NOTE — PROGRESS NOTES
Subjective   Candy Garcia is a 64 y.o. female. Patient is here today for   Chief Complaint   Patient presents with   • Hypertension          Vitals:    12/17/19 0923   BP: 120/84   Pulse: 74   Resp: 18   Temp: 98.7 °F (37.1 °C)   SpO2: 98%     Body mass index is 33.01 kg/m².      Past Medical History:   Diagnosis Date   • Acute pansinusitis    • Arthritis 8/7/2018   • Asthma    • Depression    • Disease of thyroid gland    • GERD (gastroesophageal reflux disease)    • Hip pain, acute, left    • Hypercholesterolemia    • Hyperlipidemia    • Hypertension    • Hypothyroidism    • Irritable bowel syndrome 1/19/2016   • Raynaud's phenomenon    • Right-sided lacunar infarction (CMS/HCC) 12/1/2016   • Stroke (CMS/[facility])    • Tremor 1/19/2016      Allergies   Allergen Reactions   • Penicillins Anaphylaxis     Occurred when pt was 16 years old      Social History     Socioeconomic History   • Marital status:      Spouse name: Not on file   • Number of children: Not on file   • Years of education: Not on file   • Highest education level: Not on file   Tobacco Use   • Smoking status: Never Smoker   • Smokeless tobacco: Never Used   Substance and Sexual Activity   • Alcohol use: Yes     Comment: daily caffiene   • Drug use: Never   • Sexual activity: Not Currently     Birth control/protection: Post-menopausal        Current Outpatient Medications:   •  albuterol (PROVENTIL HFA;VENTOLIN HFA) 108 (90 BASE) MCG/ACT inhaler, Inhale 1-2 puffs every 4 (four) hours as needed for wheezing., Disp: , Rfl:   •  aspirin  MG tablet, TAKE ONE TABLET BY MOUTH DAILY, Disp: 90 tablet, Rfl: 1  •  cetirizine (zyrTEC) 5 MG tablet, Take 5 mg by mouth Daily., Disp: , Rfl:   •  co-enzyme Q-10 50 MG capsule, Take 1 capsule by mouth Daily. (Patient taking differently: Take 200 mg by mouth Daily.), Disp: 30 capsule, Rfl: 11  •  dicyclomine (BENTYL) 10 MG capsule, Take 1 capsule by mouth 4 (Four) Times a Day Before Meals & at Bedtime. (Patient  taking differently: Take 10 mg by mouth As Needed.), Disp: 100 capsule, Rfl: 2  •  DULoxetine (CYMBALTA) 60 MG capsule, TAKE 1 CAPSULE BY MOUTH EVERY DAY, Disp: 30 capsule, Rfl: 1  •  fluticasone (FLONASE) 50 MCG/ACT nasal spray, 2 sprays into each nostril Daily., Disp: , Rfl:   •  levothyroxine (SYNTHROID) 88 MCG tablet, Take 1 tablet by mouth Daily., Disp: 30 tablet, Rfl: 11  •  lisinopril-hydrochlorothiazide (PRINZIDE,ZESTORETIC) 20-12.5 MG per tablet, TAKE 1 TABLET BY MOUTH EVERY DAY, Disp: 30 tablet, Rfl: 1  •  omeprazole (PriLOSEC) 20 MG capsule, Take 2 tablets by mouth every morning., Disp: , Rfl:   •  rosuvastatin (CRESTOR) 10 MG tablet, Take 1 tablet by mouth Every Night., Disp: 30 tablet, Rfl: 11  •  triamcinolone (KENALOG) 0.1 % cream, Apply  topically to the appropriate area as directed As Needed., Disp: , Rfl:   •  vitamin C (ASCORBIC ACID) 500 MG tablet, Take 1,000 mg by mouth Daily., Disp: , Rfl:      Objective     Here today to follow-up on hypertension.  She has no complaints.  She had some labs checked last week regarding hypothyroidism and a history of iron deficiency.       Review of Systems   Constitutional: Negative.    HENT: Negative.    Respiratory: Negative.    Cardiovascular: Negative.    Musculoskeletal: Negative.    Psychiatric/Behavioral: Negative.        Physical Exam   Constitutional: She is oriented to person, place, and time. She appears well-developed and well-nourished.   Pleasant, angiogram, BMI 33.   HENT:   Head: Normocephalic and atraumatic.   Pulmonary/Chest: Effort normal and breath sounds normal.   Neurological: She is alert and oriented to person, place, and time.   Psychiatric: She has a normal mood and affect. Her behavior is normal.   Nursing note and vitals reviewed.        Problem List Items Addressed This Visit        Cardiovascular and Mediastinum    Essential hypertension - Primary    Hyperlipidemia LDL goal <70       Endocrine    Hypothyroidism       Hematopoietic  and Hemostatic    Anemia            PLAN  Her hypertension is well controlled.    Hypercholesterolemia is at goal with an LDL cholesterol of 56 on Crestor 10 mg daily.    She is appropriate replacement of her hypothyroidism.    She has an iron deficiency.  I asked her to take supplemental iron over-the-counter 325 mg once daily.    I asked her to follow-up in 6 months.    Prior to that visit, she should have the following labs done: Lipid profile, comprehensive metabolic panel, iron panel, CBC, TSH and free T4.  No follow-ups on file.

## 2020-02-03 RX ORDER — DULOXETIN HYDROCHLORIDE 60 MG/1
CAPSULE, DELAYED RELEASE ORAL
Qty: 30 CAPSULE | Refills: 3 | Status: SHIPPED | OUTPATIENT
Start: 2020-02-03 | End: 2020-03-13 | Stop reason: SDUPTHER

## 2020-02-03 RX ORDER — LISINOPRIL AND HYDROCHLOROTHIAZIDE 20; 12.5 MG/1; MG/1
TABLET ORAL
Qty: 30 TABLET | Refills: 3 | Status: SHIPPED | OUTPATIENT
Start: 2020-02-03 | End: 2020-03-13 | Stop reason: SDUPTHER

## 2020-02-24 RX ORDER — ROSUVASTATIN CALCIUM 10 MG/1
TABLET, COATED ORAL
Qty: 30 TABLET | Refills: 2 | Status: SHIPPED | OUTPATIENT
Start: 2020-02-24 | End: 2020-04-25

## 2020-02-24 NOTE — TELEPHONE ENCOUNTER
Pt last seen 12/10/2019. No follow up scheduled, pt instructed to follow up if symptoms worsen or fail to improve. Do I need to advise pt to have pcp take over filling medication?       Last filled 2/4/19, quantity 30 for 30 days.      Please review and approve or advise.     Thank you.

## 2020-02-25 ENCOUNTER — TELEPHONE (OUTPATIENT)
Dept: NEUROLOGY | Facility: CLINIC | Age: 65
End: 2020-02-25

## 2020-02-25 NOTE — TELEPHONE ENCOUNTER
----- Message from TARAS Goldstein sent at 2/24/2020  6:31 PM EST -----  I refilled this patient's statin, but as your note stated, I don't really need her to f/u unless she has problems. Can we get her PCP to take over? Thanks. :)

## 2020-03-04 ENCOUNTER — TELEPHONE (OUTPATIENT)
Dept: FAMILY MEDICINE CLINIC | Facility: CLINIC | Age: 65
End: 2020-03-04

## 2020-03-04 RX ORDER — LEVOTHYROXINE SODIUM 88 UG/1
88 TABLET ORAL DAILY
Qty: 30 TABLET | Refills: 11 | Status: SHIPPED | OUTPATIENT
Start: 2020-03-04 | End: 2020-03-13 | Stop reason: SDUPTHER

## 2020-03-04 NOTE — TELEPHONE ENCOUNTER
Note      levothyroxine (SYNTHROID) 88 MCG tablet [[id number]]     Order Details         Dose: 88 mcg Route: Oral Frequency: Daily   Dispense Quantity: 30 tablet Refills: 11 Fills remaining: --               Sig: Take 1 tablet by mouth Daily.                   Pharmacy:  [facility]/PHARMACY #[id number] - [place], [place] - [address] AT [facility] [phone] [place] [phone] FX [[id number]]        Call pt [phone]     Thanks rehan

## 2020-03-04 NOTE — TELEPHONE ENCOUNTER
levothyroxine (SYNTHROID) 88 MCG tablet [[id number]]     Order Details   Dose: 88 mcg Route: Oral Frequency: Daily   Dispense Quantity: 30 tablet Refills: 11 Fills remaining: --           Sig: Take 1 tablet by mouth Daily.               Pharmacy:  [facility]/PHARMACY #[id number] [place], [place] - [address] AT [facility] [phone] [facility] [phone] FX [[id number]]      Call pt [phone]    Thanks rehan

## 2020-03-13 ENCOUNTER — TELEPHONE (OUTPATIENT)
Dept: FAMILY MEDICINE CLINIC | Facility: CLINIC | Age: 65
End: 2020-03-13

## 2020-03-13 RX ORDER — LISINOPRIL AND HYDROCHLOROTHIAZIDE 20; 12.5 MG/1; MG/1
1 TABLET ORAL DAILY
Qty: 90 TABLET | Refills: 1 | Status: SHIPPED | OUTPATIENT
Start: 2020-03-13 | End: 2020-09-08

## 2020-03-13 RX ORDER — LEVOTHYROXINE SODIUM 88 UG/1
88 TABLET ORAL DAILY
Qty: 90 TABLET | Refills: 1 | Status: SHIPPED | OUTPATIENT
Start: 2020-03-13 | End: 2020-12-01

## 2020-03-13 RX ORDER — DULOXETIN HYDROCHLORIDE 60 MG/1
60 CAPSULE, DELAYED RELEASE ORAL DAILY
Qty: 90 CAPSULE | Refills: 1 | Status: SHIPPED | OUTPATIENT
Start: 2020-03-13 | End: 2020-09-08

## 2020-03-13 NOTE — TELEPHONE ENCOUNTER
Patient is requesting  A change to the medications from 30 day to 90 day    lisinopril-hydrochlorothiazide (PRINZIDE,ZESTORETIC) 20-12.5 MG per tablet    DULoxetine (CYMBALTA) 60 MG capsule    levothyroxine (SYNTHROID) 88 MCG tab    [address]    Pt # [phone]

## 2020-04-24 NOTE — TELEPHONE ENCOUNTER
Pt last seen 12/10/19.Pt was instructed to follow up as needed.       Quantity 30 for 30 days.      Please review and approve or advise.     Thank you.

## 2020-04-25 RX ORDER — ROSUVASTATIN CALCIUM 10 MG/1
TABLET, COATED ORAL
Qty: 30 TABLET | Refills: 2 | Status: SHIPPED | OUTPATIENT
Start: 2020-04-25 | End: 2020-09-28 | Stop reason: SDUPTHER

## 2020-06-08 DIAGNOSIS — E03.9 HYPOTHYROIDISM, UNSPECIFIED TYPE: ICD-10-CM

## 2020-06-08 DIAGNOSIS — I10 ESSENTIAL HYPERTENSION: Primary | ICD-10-CM

## 2020-06-08 DIAGNOSIS — D50.9 IRON DEFICIENCY ANEMIA, UNSPECIFIED IRON DEFICIENCY ANEMIA TYPE: ICD-10-CM

## 2020-06-08 DIAGNOSIS — E78.5 HYPERLIPIDEMIA, UNSPECIFIED HYPERLIPIDEMIA TYPE: ICD-10-CM

## 2020-06-15 ENCOUNTER — RESULTS ENCOUNTER (OUTPATIENT)
Dept: FAMILY MEDICINE CLINIC | Facility: CLINIC | Age: 65
End: 2020-06-15

## 2020-06-15 DIAGNOSIS — D50.9 IRON DEFICIENCY ANEMIA, UNSPECIFIED IRON DEFICIENCY ANEMIA TYPE: ICD-10-CM

## 2020-06-15 DIAGNOSIS — E78.5 HYPERLIPIDEMIA, UNSPECIFIED HYPERLIPIDEMIA TYPE: ICD-10-CM

## 2020-06-15 DIAGNOSIS — I10 ESSENTIAL HYPERTENSION: ICD-10-CM

## 2020-06-15 DIAGNOSIS — E03.9 HYPOTHYROIDISM, UNSPECIFIED TYPE: ICD-10-CM

## 2020-06-18 RX ORDER — MELOXICAM 7.5 MG/1
7.5 TABLET ORAL DAILY
Qty: 90 TABLET | Refills: 1 | Status: SHIPPED | OUTPATIENT
Start: 2020-06-18 | End: 2021-02-10

## 2020-08-03 DIAGNOSIS — E78.5 HYPERLIPIDEMIA LDL GOAL <70: ICD-10-CM

## 2020-08-03 DIAGNOSIS — D50.9 IRON DEFICIENCY ANEMIA, UNSPECIFIED IRON DEFICIENCY ANEMIA TYPE: Primary | ICD-10-CM

## 2020-08-03 DIAGNOSIS — E03.9 HYPOTHYROIDISM, UNSPECIFIED TYPE: ICD-10-CM

## 2020-08-18 LAB
ALBUMIN SERPL-MCNC: 4.6 G/DL (ref 3.5–5.2)
ALBUMIN/GLOB SERPL: 2.7 G/DL
ALP SERPL-CCNC: 130 U/L (ref 39–117)
ALT SERPL-CCNC: 25 U/L (ref 1–33)
AST SERPL-CCNC: 20 U/L (ref 1–32)
BASOPHILS # BLD AUTO: 0.05 10*3/MM3 (ref 0–0.2)
BASOPHILS NFR BLD AUTO: 1 % (ref 0–1.5)
BILIRUB SERPL-MCNC: 0.6 MG/DL (ref 0–1.2)
BUN SERPL-MCNC: 16 MG/DL (ref 8–23)
BUN/CREAT SERPL: 19.8 (ref 7–25)
CALCIUM SERPL-MCNC: 9.4 MG/DL (ref 8.6–10.5)
CHLORIDE SERPL-SCNC: 102 MMOL/L (ref 98–107)
CHOLEST SERPL-MCNC: 137 MG/DL (ref 0–200)
CO2 SERPL-SCNC: 28.8 MMOL/L (ref 22–29)
CREAT SERPL-MCNC: 0.81 MG/DL (ref 0.57–1)
EOSINOPHIL # BLD AUTO: 0.15 10*3/MM3 (ref 0–0.4)
EOSINOPHIL NFR BLD AUTO: 3 % (ref 0.3–6.2)
ERYTHROCYTE [DISTWIDTH] IN BLOOD BY AUTOMATED COUNT: 13.2 % (ref 12.3–15.4)
GLOBULIN SER CALC-MCNC: 1.7 GM/DL
GLUCOSE SERPL-MCNC: 91 MG/DL (ref 65–99)
HCT VFR BLD AUTO: 43.9 % (ref 34–46.6)
HDLC SERPL-MCNC: 68 MG/DL (ref 40–60)
HGB BLD-MCNC: 13.8 G/DL (ref 12–15.9)
IMM GRANULOCYTES # BLD AUTO: 0 10*3/MM3 (ref 0–0.05)
IMM GRANULOCYTES NFR BLD AUTO: 0 % (ref 0–0.5)
IRON SATN MFR SERPL: 20 % (ref 20–50)
IRON SERPL-MCNC: 93 MCG/DL (ref 37–145)
LDLC SERPL CALC-MCNC: 47 MG/DL (ref 0–100)
LDLC/HDLC SERPL: 0.69 {RATIO}
LYMPHOCYTES # BLD AUTO: 1.07 10*3/MM3 (ref 0.7–3.1)
LYMPHOCYTES NFR BLD AUTO: 21.7 % (ref 19.6–45.3)
MCH RBC QN AUTO: 28.3 PG (ref 26.6–33)
MCHC RBC AUTO-ENTMCNC: 31.4 G/DL (ref 31.5–35.7)
MCV RBC AUTO: 90.1 FL (ref 79–97)
MONOCYTES # BLD AUTO: 0.29 10*3/MM3 (ref 0.1–0.9)
MONOCYTES NFR BLD AUTO: 5.9 % (ref 5–12)
NEUTROPHILS # BLD AUTO: 3.36 10*3/MM3 (ref 1.7–7)
NEUTROPHILS NFR BLD AUTO: 68.4 % (ref 42.7–76)
NRBC BLD AUTO-RTO: 0 /100 WBC (ref 0–0.2)
PLATELET # BLD AUTO: 220 10*3/MM3 (ref 140–450)
POTASSIUM SERPL-SCNC: 5.2 MMOL/L (ref 3.5–5.2)
PROT SERPL-MCNC: 6.3 G/DL (ref 6–8.5)
RBC # BLD AUTO: 4.87 10*6/MM3 (ref 3.77–5.28)
SODIUM SERPL-SCNC: 140 MMOL/L (ref 136–145)
T4 FREE SERPL-MCNC: 1.3 NG/DL (ref 0.93–1.7)
TIBC SERPL-MCNC: 458 MCG/DL
TRIGL SERPL-MCNC: 112 MG/DL (ref 0–150)
TSH SERPL DL<=0.005 MIU/L-ACNC: 4.65 UIU/ML (ref 0.27–4.2)
UIBC SERPL-MCNC: 365 MCG/DL (ref 112–346)
VLDLC SERPL CALC-MCNC: 22.4 MG/DL
WBC # BLD AUTO: 4.92 10*3/MM3 (ref 3.4–10.8)

## 2020-08-24 ENCOUNTER — OFFICE VISIT (OUTPATIENT)
Dept: FAMILY MEDICINE CLINIC | Facility: CLINIC | Age: 65
End: 2020-08-24

## 2020-08-24 VITALS
SYSTOLIC BLOOD PRESSURE: 112 MMHG | TEMPERATURE: 98 F | HEIGHT: 64 IN | HEART RATE: 83 BPM | DIASTOLIC BLOOD PRESSURE: 70 MMHG | OXYGEN SATURATION: 99 % | BODY MASS INDEX: 33.43 KG/M2 | WEIGHT: 195.8 LBS | RESPIRATION RATE: 18 BRPM

## 2020-08-24 DIAGNOSIS — E78.5 HYPERLIPIDEMIA LDL GOAL <70: ICD-10-CM

## 2020-08-24 DIAGNOSIS — D50.8 IRON DEFICIENCY ANEMIA SECONDARY TO INADEQUATE DIETARY IRON INTAKE: ICD-10-CM

## 2020-08-24 DIAGNOSIS — I10 ESSENTIAL HYPERTENSION: Primary | ICD-10-CM

## 2020-08-24 PROCEDURE — 99214 OFFICE O/P EST MOD 30 MIN: CPT | Performed by: INTERNAL MEDICINE

## 2020-08-24 NOTE — PROGRESS NOTES
Subjective   Candy Garcia is a 65 y.o. female. Patient is here today for   Chief Complaint   Patient presents with   • Hyperlipidemia   • Hypertension   • Hypothyroidism          Vitals:    08/24/20 1057   BP: 112/70   Pulse: 83   Resp: 18   Temp: 98 °F (36.7 °C)   SpO2: 99%     Body mass index is 33.59 kg/m².      Past Medical History:   Diagnosis Date   • Acute pansinusitis    • Arthritis 8/7/2018   • Asthma    • Depression    • Disease of thyroid gland    • GERD (gastroesophageal reflux disease)    • Hip pain, acute, left    • Hypercholesterolemia    • Hyperlipidemia    • Hypertension    • Hypothyroidism    • Irritable bowel syndrome 1/19/2016   • Raynaud's phenomenon    • Right-sided lacunar infarction (CMS/HCC) 12/1/2016   • Stroke (CMS/[facility])    • Tremor 1/19/2016      Allergies   Allergen Reactions   • Penicillins Anaphylaxis     Occurred when pt was 16 years old      Social History     Socioeconomic History   • Marital status:      Spouse name: Not on file   • Number of children: Not on file   • Years of education: Not on file   • Highest education level: Not on file   Tobacco Use   • Smoking status: Never Smoker   • Smokeless tobacco: Never Used   Substance and Sexual Activity   • Alcohol use: Yes     Comment: daily caffiene   • Drug use: Never   • Sexual activity: Not Currently     Birth control/protection: Post-menopausal        Current Outpatient Medications:   •  albuterol (PROVENTIL HFA;VENTOLIN HFA) 108 (90 BASE) MCG/ACT inhaler, Inhale 1-2 puffs every 4 (four) hours as needed for wheezing., Disp: , Rfl:   •  aspirin  MG tablet, TAKE ONE TABLET BY MOUTH DAILY, Disp: 90 tablet, Rfl: 1  •  cetirizine (zyrTEC) 5 MG tablet, Take 5 mg by mouth Daily., Disp: , Rfl:   •  dicyclomine (BENTYL) 10 MG capsule, Take 1 capsule by mouth 4 (Four) Times a Day Before Meals & at Bedtime. (Patient taking differently: Take 10 mg by mouth As Needed.), Disp: 100 capsule, Rfl: 2  •  DULoxetine (CYMBALTA) 60 MG  capsule, Take 1 capsule by mouth Daily., Disp: 90 capsule, Rfl: 1  •  fluticasone (FLONASE) 50 MCG/ACT nasal spray, 2 sprays into each nostril Daily., Disp: , Rfl:   •  levothyroxine (SYNTHROID) 88 MCG tablet, Take 1 tablet by mouth Daily., Disp: 90 tablet, Rfl: 1  •  lisinopril-hydrochlorothiazide (PRINZIDE,ZESTORETIC) 20-12.5 MG per tablet, Take 1 tablet by mouth Daily., Disp: 90 tablet, Rfl: 1  •  meloxicam (MOBIC) 7.5 MG tablet, Take 1 tablet by mouth Daily., Disp: 90 tablet, Rfl: 1  •  Multiple Vitamins-Minerals (CENTRUM SILVER 50+WOMEN PO), Take  by mouth Daily., Disp: , Rfl:   •  omeprazole (PriLOSEC) 20 MG capsule, Take 2 tablets by mouth every morning., Disp: , Rfl:   •  rosuvastatin (CRESTOR) 10 MG tablet, TAKE 1 TABLET BY MOUTH EVERYDAY AT BEDTIME (Patient taking differently: 5 mg.), Disp: 30 tablet, Rfl: 2     Objective     She is here to follow-up on hypercholesterolemia, hypertension, and hypothyroidism.    She feels well.       Review of Systems   Constitutional: Negative.    HENT: Negative.    Respiratory: Negative.    Cardiovascular: Negative.    Musculoskeletal: Negative.    Hematological: Negative.    Psychiatric/Behavioral: Negative.        Physical Exam   Constitutional: She is oriented to person, place, and time. She appears well-developed and well-nourished.   Pleasant, neatly groomed, no distress.   HENT:   Head: Normocephalic and atraumatic.   Cardiovascular: Normal rate and regular rhythm.   Pulmonary/Chest: Effort normal and breath sounds normal.   Neurological: She is alert and oriented to person, place, and time.   Psychiatric: She has a normal mood and affect. Her behavior is normal.   Nursing note and vitals reviewed.        Problem List Items Addressed This Visit        Cardiovascular and Mediastinum    Essential hypertension - Primary    Hyperlipidemia LDL goal <70       Hematopoietic and Hemostatic    Anemia            PLAN  She and I reviewed her labs.    Her hypercholesterolemia  is well controlled.    Her hypertension is well cold.    Her iron deficiency has resolved with oral iron.  She is currently taking a multivitamin with iron once daily.    I asked her to continue her oral iron.  She should follow-up for a comprehensive physical examination in about 6 months.    Her hypothyroidism is inadequately replaced.  I asked her to start taking levothyroxine 100 mcg daily.  I gave her samples of this.  Enough to last her 14 weeks.  I will have her back in 12 weeks.  She should follow-up a week later.  No follow-ups on file.

## 2020-08-26 ENCOUNTER — TELEPHONE (OUTPATIENT)
Dept: FAMILY MEDICINE CLINIC | Facility: CLINIC | Age: 65
End: 2020-08-26

## 2020-08-26 DIAGNOSIS — M72.2 PLANTAR FASCIITIS: Primary | ICD-10-CM

## 2020-08-26 NOTE — TELEPHONE ENCOUNTER
PATIENT CALLED AND STATES SHE BELIEVES SHE HAS PLANTER FACTITIOUS ON HER LEFT FOOT. IT'S VERY PAINFUL, HARD TO WALK ON ESPECIALLY IN THE MORNING.  SHE FORGOT TO MENTION AT LAST VISIT.   SHE IS STROKE PATIENT AND AFRAID TO TAKE IBUPROFEN. SHE IS ON MELOXICAM 7.5 MG AND WANTS TO KNOW IF SHE CAN INCREASE THIS. SHE TAKES   aspirin  MG tablet  AS WELL. SHE IS CONCERN ABOUT BLEEDING.    PLEASE CALL AND ADVISE [phone]

## 2020-09-02 ENCOUNTER — APPOINTMENT (OUTPATIENT)
Dept: PHYSICAL THERAPY | Facility: HOSPITAL | Age: 65
End: 2020-09-02

## 2020-09-08 RX ORDER — DULOXETIN HYDROCHLORIDE 60 MG/1
CAPSULE, DELAYED RELEASE ORAL
Qty: 90 CAPSULE | Refills: 1 | Status: SHIPPED | OUTPATIENT
Start: 2020-09-08 | End: 2021-03-10

## 2020-09-08 RX ORDER — LISINOPRIL AND HYDROCHLOROTHIAZIDE 20; 12.5 MG/1; MG/1
TABLET ORAL
Qty: 90 TABLET | Refills: 1 | Status: SHIPPED | OUTPATIENT
Start: 2020-09-08 | End: 2021-03-05

## 2020-09-28 RX ORDER — ROSUVASTATIN CALCIUM 10 MG/1
10 TABLET, COATED ORAL
Qty: 30 TABLET | Refills: 2 | Status: SHIPPED | OUTPATIENT
Start: 2020-09-28 | End: 2021-07-09

## 2020-10-01 RX ORDER — DICYCLOMINE HYDROCHLORIDE 10 MG/1
CAPSULE ORAL
Qty: 100 CAPSULE | Refills: 2 | Status: SHIPPED | OUTPATIENT
Start: 2020-10-01

## 2020-11-06 DIAGNOSIS — E03.9 HYPOTHYROIDISM, UNSPECIFIED TYPE: Primary | ICD-10-CM

## 2020-11-20 LAB
T4 FREE SERPL-MCNC: 1.26 NG/DL (ref 0.93–1.7)
TSH SERPL DL<=0.005 MIU/L-ACNC: 0.61 UIU/ML (ref 0.27–4.2)

## 2020-12-01 ENCOUNTER — OFFICE VISIT (OUTPATIENT)
Dept: FAMILY MEDICINE CLINIC | Facility: CLINIC | Age: 65
End: 2020-12-01

## 2020-12-01 VITALS
HEIGHT: 64 IN | DIASTOLIC BLOOD PRESSURE: 70 MMHG | HEART RATE: 85 BPM | RESPIRATION RATE: 18 BRPM | BODY MASS INDEX: 33.59 KG/M2 | OXYGEN SATURATION: 99 % | TEMPERATURE: 97.7 F | SYSTOLIC BLOOD PRESSURE: 120 MMHG

## 2020-12-01 DIAGNOSIS — I10 ESSENTIAL HYPERTENSION: Primary | ICD-10-CM

## 2020-12-01 DIAGNOSIS — L03.211 CELLULITIS OF FACE: ICD-10-CM

## 2020-12-01 DIAGNOSIS — E03.9 HYPOTHYROIDISM, UNSPECIFIED TYPE: ICD-10-CM

## 2020-12-01 PROCEDURE — 99214 OFFICE O/P EST MOD 30 MIN: CPT | Performed by: INTERNAL MEDICINE

## 2020-12-01 RX ORDER — SULFAMETHOXAZOLE AND TRIMETHOPRIM 800; 160 MG/1; MG/1
1 TABLET ORAL 2 TIMES DAILY
Qty: 20 TABLET | Refills: 0 | Status: SHIPPED | OUTPATIENT
Start: 2020-12-01 | End: 2021-05-28

## 2020-12-01 RX ORDER — LEVOTHYROXINE SODIUM 0.1 MG/1
100 TABLET ORAL DAILY
Qty: 90 TABLET | Refills: 3 | Status: SHIPPED | OUTPATIENT
Start: 2020-12-01 | End: 2021-11-23 | Stop reason: SDUPTHER

## 2020-12-01 NOTE — PROGRESS NOTES
Subjective   Candy Garcia is a 65 y.o. female. Patient is here today for   Chief Complaint   Patient presents with   • Hypothyroidism          Vitals:    12/01/20 0958   BP: 120/70   Pulse: 85   Resp: 18   Temp: 97.7 °F (36.5 °C)   SpO2: 99%     Body mass index is 33.59 kg/m².      Past Medical History:   Diagnosis Date   • Acute pansinusitis    • Arthritis 8/7/2018   • Asthma    • Depression    • Disease of thyroid gland    • GERD (gastroesophageal reflux disease)    • Hip pain, acute, left    • Hypercholesterolemia    • Hyperlipidemia    • Hypertension    • Hypothyroidism    • Irritable bowel syndrome 1/19/2016   • Raynaud's phenomenon    • Right-sided lacunar infarction (CMS/HCC) 12/1/2016   • Stroke (CMS/[facility])    • Tremor 1/19/2016      Allergies   Allergen Reactions   • Penicillins Anaphylaxis     Occurred when pt was 16 years old      Social History     Socioeconomic History   • Marital status:      Spouse name: Not on file   • Number of children: Not on file   • Years of education: Not on file   • Highest education level: Not on file   Tobacco Use   • Smoking status: Never Smoker   • Smokeless tobacco: Never Used   Substance and Sexual Activity   • Alcohol use: Yes     Comment: daily caffiene   • Drug use: Never   • Sexual activity: Not Currently     Birth control/protection: Post-menopausal        Current Outpatient Medications:   •  albuterol (PROVENTIL HFA;VENTOLIN HFA) 108 (90 BASE) MCG/ACT inhaler, Inhale 1-2 puffs every 4 (four) hours as needed for wheezing., Disp: , Rfl:   •  aspirin  MG tablet, TAKE ONE TABLET BY MOUTH DAILY, Disp: 90 tablet, Rfl: 1  •  cetirizine (zyrTEC) 5 MG tablet, Take 5 mg by mouth Daily., Disp: , Rfl:   •  dicyclomine (BENTYL) 10 MG capsule, TAKE 1 CAPSULE BY MOUTH FOUR TIMES A DAY BEFORE MEALS AND AT BEDTIME, Disp: 100 capsule, Rfl: 2  •  DULoxetine (CYMBALTA) 60 MG capsule, TAKE 1 CAPSULE BY MOUTH EVERY DAY, Disp: 90 capsule, Rfl: 1  •   lisinopril-hydrochlorothiazide (PRINZIDE,ZESTORETIC) 20-12.5 MG per tablet, TAKE 1 TABLET BY MOUTH EVERY DAY, Disp: 90 tablet, Rfl: 1  •  meloxicam (MOBIC) 7.5 MG tablet, Take 1 tablet by mouth Daily., Disp: 90 tablet, Rfl: 1  •  Multiple Vitamins-Minerals (CENTRUM SILVER 50+WOMEN PO), Take  by mouth Daily., Disp: , Rfl:   •  omeprazole (PriLOSEC) 20 MG capsule, Take 2 tablets by mouth every morning., Disp: , Rfl:   •  rosuvastatin (CRESTOR) 10 MG tablet, Take 1 tablet by mouth every night at bedtime., Disp: 30 tablet, Rfl: 2  •  levothyroxine (Synthroid) 100 MCG tablet, Take 1 tablet by mouth Daily., Disp: 90 tablet, Rfl: 3  •  sulfamethoxazole-trimethoprim (Bactrim DS) 800-160 MG per tablet, Take 1 tablet by mouth 2 (Two) Times a Day., Disp: 20 tablet, Rfl: 0     Objective     She is here today to follow-up on hypothyroidism.    She complains of a sore in her right nostril which has been present for a couple weeks.       Review of Systems   Constitutional: Negative.    HENT:        She has had some pain in her right nostril for a couple months now.   Respiratory: Negative.    Cardiovascular: Negative.    Musculoskeletal: Negative.    Psychiatric/Behavioral: Negative.        Physical Exam  Vitals signs and nursing note reviewed.   Constitutional:       Appearance: Normal appearance. She is not ill-appearing or diaphoretic.      Comments: Pleasant, neatly groomed, in no distress.   HENT:      Nose:      Comments: She had some tender erythema just inside her right nostril on the lateral surface.  Neck:      Vascular: No carotid bruit.   Cardiovascular:      Heart sounds: Normal heart sounds. No murmur. No gallop.    Pulmonary:      Effort: No respiratory distress.      Breath sounds: Normal breath sounds. No wheezing or rales.   Neurological:      Mental Status: She is alert and oriented to person, place, and time.   Psychiatric:         Mood and Affect: Mood normal.         Behavior: Behavior normal.         Thought  Content: Thought content normal.         Judgment: Judgment normal.           Problems Addressed this Visit        Cardiovascular and Mediastinum    Essential hypertension - Primary       Endocrine    Hypothyroidism    Relevant Medications    levothyroxine (Synthroid) 100 MCG tablet       Other    Cellulitis of face      Diagnoses       Codes Comments    Essential hypertension    -  Primary ICD-10-CM: I10  ICD-9-CM: 401.9     Hypothyroidism, unspecified type     ICD-10-CM: E03.9  ICD-9-CM: 244.9     Cellulitis of face     ICD-10-CM: L03.211  ICD-9-CM: 682.0             PLAN  Her hypertension is well controlled.    Her hypothyroidism is appropriately placed.    She has a cellulitis in the intranasal area of her right nostril.  I have sent out a prescription for Bactrim DS 1.  20 no refills.    Would like to have her back in about 6 months.  Should include lipid profile, comprehensive metabolic panel, CBC  No follow-ups on file.

## 2020-12-11 ENCOUNTER — APPOINTMENT (OUTPATIENT)
Dept: WOMENS IMAGING | Facility: HOSPITAL | Age: 65
End: 2020-12-11

## 2020-12-11 PROCEDURE — 77067 SCR MAMMO BI INCL CAD: CPT | Performed by: RADIOLOGY

## 2020-12-11 PROCEDURE — 77063 BREAST TOMOSYNTHESIS BI: CPT | Performed by: RADIOLOGY

## 2021-02-10 RX ORDER — MELOXICAM 7.5 MG/1
TABLET ORAL
Qty: 30 TABLET | Refills: 5 | Status: SHIPPED | OUTPATIENT
Start: 2021-02-10 | End: 2021-08-27 | Stop reason: SDUPTHER

## 2021-02-18 DIAGNOSIS — E03.9 HYPOTHYROIDISM, UNSPECIFIED TYPE: ICD-10-CM

## 2021-02-18 DIAGNOSIS — E78.5 HYPERLIPIDEMIA LDL GOAL <70: ICD-10-CM

## 2021-02-18 DIAGNOSIS — I10 ESSENTIAL HYPERTENSION: Primary | ICD-10-CM

## 2021-03-05 RX ORDER — LISINOPRIL AND HYDROCHLOROTHIAZIDE 20; 12.5 MG/1; MG/1
TABLET ORAL
Qty: 90 TABLET | Refills: 3 | Status: SHIPPED | OUTPATIENT
Start: 2021-03-05 | End: 2022-05-10

## 2021-03-10 RX ORDER — DULOXETIN HYDROCHLORIDE 60 MG/1
CAPSULE, DELAYED RELEASE ORAL
Qty: 90 CAPSULE | Refills: 1 | Status: SHIPPED | OUTPATIENT
Start: 2021-03-10 | End: 2021-11-17 | Stop reason: SDUPTHER

## 2021-03-19 ENCOUNTER — BULK ORDERING (OUTPATIENT)
Dept: CASE MANAGEMENT | Facility: OTHER | Age: 66
End: 2021-03-19

## 2021-03-19 DIAGNOSIS — Z23 IMMUNIZATION DUE: ICD-10-CM

## 2021-04-02 ENCOUNTER — TREATMENT (OUTPATIENT)
Dept: PHYSICAL THERAPY | Facility: CLINIC | Age: 66
End: 2021-04-02

## 2021-04-02 DIAGNOSIS — R26.2 DIFFICULTY WALKING: ICD-10-CM

## 2021-04-02 DIAGNOSIS — M25.552 LEFT HIP PAIN: Primary | ICD-10-CM

## 2021-04-02 PROCEDURE — 97110 THERAPEUTIC EXERCISES: CPT | Performed by: PHYSICAL THERAPIST

## 2021-04-02 PROCEDURE — 97162 PT EVAL MOD COMPLEX 30 MIN: CPT | Performed by: PHYSICAL THERAPIST

## 2021-04-02 NOTE — PROGRESS NOTES
Physical Therapy Initial Evaluation and Plan of Care      Patient: Candy Garcia   : 1955  Diagnosis/ICD-10 Code:  Left hip pain [M25.552]  Referring practitioner: Eduardo Greco*  Date of Initial Visit: 2021  Today's Date: 2021  Patient seen for 1 sessions           Subjective Evaluation    History of Present Illness  Mechanism of injury: Patient complains of Chronic L Hip Pain. She underwent a L YOBANI in  and in 2018 she had to undergo a revision because her hardware was loose and she was still having a lot of pain. After her revision all she did for rehabilitation was walk but she feels like the pain has worsened over the past year.     Patient enjoys walking and hiking for exercise but she is no longer able to walk as far and she has trouble on uneven terrain. Patient has increase hip pain even with short distances but she pushes herself to walk ~1/4 mile per day (she used to do 4 miles per day). Patient has difficulty navigating upstairs but going down is no concern.    ROM is not limited but there is slightly pain at end range.     Dr. LOVE gave her a prednisone pack which significant reduced her pain so she believes it is mostly inflammation. Recent imaging () looked good.     PMH includes stroke ()    Quality of life: good    Pain  Current pain ratin  At best pain ratin  At worst pain ratin  Location: L Hip  Quality: dull ache  Relieving factors: support, rest, medications and ice (prednisone, aspirin)  Aggravating factors: stairs, standing, movement and ambulation  Progression: worsening    Social Support  Lives in: multiple-level home  Lives with: spouse    Diagnostic Tests  X-ray: normal (YOBANI)    Treatments  Previous treatment: medication  Patient Goals  Patient goals for therapy: increased strength, decreased pain, improved balance and independence with ADLs/IADLs             Objective          Active Range of Motion   Left Hip   Flexion: 110 degrees    External rotation (90/90): 86 degrees     Right Hip   Flexion: 110 degrees   External rotation (90/90): 86 degrees     Strength/Myotome Testing     Left Hip   Planes of Motion   Flexion: 4-  Abduction: 4  External rotation: 3+  Internal rotation: 3+    Right Hip   Planes of Motion   Flexion: 4  Abduction: 4  External rotation: 4  Internal rotation: 4    Left Knee   Flexion: 4+  Extension: 4+    Right Knee   Flexion: 4+  Extension: 4+    Tests     Left Hip   Positive JORGE.   Negative scour.     Ambulation     Comments   Compensated trendelenburg with lateral trunk lean. Mild L LE antalgia which seems to worsen with increased distance.         See Exercise, Manual, and Modality Logs for complete treatment.       Functional Outcome Score: LEFS: 30/80    EXERCISES:  -Sidelying hip abduction, 2x20  -Sidelying clamshells, 2x20  -Standing hip IR/ER x10 B    Assessment & Plan     Assessment  Impairments: abnormal gait, abnormal muscle firing, abnormal or restricted ROM, activity intolerance, impaired balance, impaired physical strength, lacks appropriate home exercise program and pain with function  Assessment details: Candy Garcia is a 65 y.o. year-old female referred to physical therapy for L Hip pain due to greater trochanteric bursitis and psoas tendonitis. She presents with a evolving clinical presentation.  She has comorbidities of stroke in 2016 and L YOBANI in 2017 with a revision in 2018 and personal factors of having to use stairs at home which may affect her progress in the plan of care. Imaging from last June showed a stable hip replacement and oral prednisone significantly reduced her pain. Signs and symptoms are consistent with physical therapy diagnosis of L hip pain and difficulty walking. Objective findings include impaired hip strength (mostly with IR/ER), antalgic gait, and compromised balance. Patient is appropriate for skilled physical therapy in order to reduce pain and increase ease with daily  mobility.   Barriers to therapy: none identified  Prognosis: good  Functional Limitations: lifting, sleeping, walking, uncomfortable because of pain, sitting and standing  Goals  Plan Goals: STGs to be completed within 30 days:  -Patient will demonstrate compliance and independence with initial HEP  -Patient will increase L Hip ER strength to 4-/5 to help stabilize hip during gait  -Patient will perform sit to stand transfer with equilateral WB and no UE assistance  -Patient will ambulate household distances with <4/10 pain to allow patient to complete ADLs    LTGs to be completed within 90 days:  -Patient will increase L Hip ER strength to 4/5 to help increase ease with putting on shoes/socks  -Patient will complete community mobility 20 min or more with <2/10 pain to allow patient to walk for leisure  -Patient will improve score on LEFS from 30 at eval to 50 or greater to improve quality of life    Plan  Therapy options: will be seen for skilled physical therapy services  Planned modality interventions: TENS, ultrasound and electrical stimulation/Russian stimulation  Planned therapy interventions: joint mobilization, stretching, strengthening, therapeutic activities, transfer training, postural training, manual therapy, ADL retraining, balance/weight-bearing training, dressing changes, flexibility, functional ROM exercises, gait training, home exercise program, neuromuscular re-education and motor coordination training  Other planned therapy interventions: Aquatic Therapy  Frequency: 36 visits.  Treatment plan discussed with: patient  Plan details: Gait training, stairs, Balance, Knee ROM/strength, LE stability        Timed:  Manual Therapy:         mins  81686;  Therapeutic Exercise:    9     mins  48276;     Neuromuscular Salvador:        mins  33999;    Therapeutic Activity:          mins  29536;     Gait Training:           mins  35102;     Ultrasound:          mins  65446;    Electrical Stimulation:         mins   72788 ( );  Iontophoresis         mins 27291  Dry Needling        mins      Untimed:  Electrical Stimulation:         mins  61894 ( );  Mechanical Traction:         mins  72855;     Timed Treatment:   9   mins   Total Treatment:     35   mins    PT SIGNATURE: Gabriella Montenegro, BARBARA   DATE TREATMENT INITIATED: 4/2/2021    Initial Certification  Certification Period: 7/1/2021  I certify that the therapy services are furnished while this patient is under my care.  The services outlined above are required by this patient, and will be reviewed every 90 days.     PHYSICIAN: Eduardo Rodrigues MD      DATE:     Please sign and return via fax to [phone] Thank you, [facility].

## 2021-04-14 ENCOUNTER — TREATMENT (OUTPATIENT)
Dept: PHYSICAL THERAPY | Facility: CLINIC | Age: 66
End: 2021-04-14

## 2021-04-14 DIAGNOSIS — M25.552 LEFT HIP PAIN: Primary | ICD-10-CM

## 2021-04-14 DIAGNOSIS — R26.2 DIFFICULTY WALKING: ICD-10-CM

## 2021-04-14 PROCEDURE — 97110 THERAPEUTIC EXERCISES: CPT | Performed by: PHYSICAL THERAPIST

## 2021-04-14 PROCEDURE — 97140 MANUAL THERAPY 1/> REGIONS: CPT | Performed by: PHYSICAL THERAPIST

## 2021-04-14 PROCEDURE — 97035 APP MDLTY 1+ULTRASOUND EA 15: CPT | Performed by: PHYSICAL THERAPIST

## 2021-04-14 NOTE — PROGRESS NOTES
Physical Therapy Daily Progress Note    Patient: Candy Garcia   : 1955  Diagnosis/ICD-10 Code:  Left hip pain [M25.552]  Referring practitioner: Eduardo Greco*  Date of Initial Visit: Type: THERAPY  Noted: 2021  Today's Date: 2021  Patient seen for 2 sessions           Subjective   Patient reports that she feels like any sort of physical activity increases her pain.    Objective   See Exercise, Manual, and Modality Logs for complete treatment.     MANUAL:  -STM to TFL with mild to moderate pressure. Strummed along muscle belly to improve tissue extensibility and reduce pain.    EXERCISES:  -TFL stretch in supine 3x30 sec  -Hamstring 90/90 nerve glide, 10x10 sec  -Frog stretch supine, 2 min  -LTR 20x      Assessment/Plan  Focused mostly on pain relief with ultrasound, STM, and gentle stretching. Patient had difficulty initially tolerating the STM, especially in the center of her TFL (did better with massage in more anterior spot of muscle). Patient able to reports feeling looser at end of session.         Timed:    Manual Therapy:    24     mins  01460;  Therapeutic Exercise:    9     mins  19544;     Neuromuscular Salvador:        mins  21844;    Therapeutic Activity:          mins  23439;     Gait Training:           mins  51321;     Ultrasound:     8     mins  92936;    Electrical Stimulation:         mins  12373 ( );  Iontophoresis         mins 28537;  Aquatic Therapy         mins 60532;  Dry Needling                   mins    Untimed:  Electrical Stimulation:         mins  45359 (MC );  Mechanical Traction:         mins  12218;     Timed Treatment:   41   mins   Total Treatment:     41   mins  Gabriella Montenegro PT  Physical Therapist

## 2021-04-19 ENCOUNTER — TREATMENT (OUTPATIENT)
Dept: PHYSICAL THERAPY | Facility: CLINIC | Age: 66
End: 2021-04-19

## 2021-04-19 DIAGNOSIS — R26.2 DIFFICULTY WALKING: ICD-10-CM

## 2021-04-19 DIAGNOSIS — M25.552 LEFT HIP PAIN: Primary | ICD-10-CM

## 2021-04-19 PROCEDURE — 97110 THERAPEUTIC EXERCISES: CPT | Performed by: PHYSICAL THERAPIST

## 2021-04-19 PROCEDURE — 97035 APP MDLTY 1+ULTRASOUND EA 15: CPT | Performed by: PHYSICAL THERAPIST

## 2021-04-19 PROCEDURE — 97530 THERAPEUTIC ACTIVITIES: CPT | Performed by: PHYSICAL THERAPIST

## 2021-04-19 NOTE — PROGRESS NOTES
Physical Therapy Daily Progress Note    Patient: Candy Garcia   : 1955  Diagnosis/ICD-10 Code:  Left hip pain [M25.552]  Referring practitioner: Eduardo Greco*  Date of Initial Visit: Type: THERAPY  Noted: 2021  Today's Date: 2021  Patient seen for 3 sessions           Subjective Patient reports that her hip felt better after massage and ultrasound.      Objective   See Exercise, Manual, and Modality Logs for complete treatment.     Discussed her difficulties with stairs and recommend she take one at a time, use rail, limit carry  Discussed her difficulties with walking but she did better this weekend picking up trash at slow pace for 2 hours  Evaluated her hip strength with exercises and were able to find more challenging exercise positions:  Added hip extension with hip abduction on her side, single leg bridge, step ups to small book/paper and would like to progress to single leg standing     Suggest getting a TENS unit for the hip  Suggest walking in water if she has a pool available this summer    Assessment/Plan    The left hip region is either too inflamed or not strong enough to stabilize going up stairs and walking and seems more likely to be inflamed since she felt more comfortable with activity while on Prednisone so the plan is to calm down inflammation while working on her strengthening in less irritating positions.        Timed:    Manual Therapy:    0     mins  51665;  Therapeutic Exercise:    20     mins  60165;     Neuromuscular Salvador:    0    mins  00965;    Therapeutic Activity:     12     mins  88837;     Gait Trainin     mins  94502;     Ultrasound:     10     mins  39148;    Electrical Stimulation:    0     mins  83066 ( );  Iontophoresis    0     mins 31806;  Aquatic Therapy    0     mins 73273;  Dry Needling              0     mins    Untimed:  Electrical Stimulation:    0     mins  43788 ( );  Mechanical Traction:    0     mins  36953;      Timed Treatment:   42   mins   Total Treatment:     42   mins  Vickie Hooper, PT  Physical Therapist

## 2021-04-21 ENCOUNTER — TREATMENT (OUTPATIENT)
Dept: PHYSICAL THERAPY | Facility: CLINIC | Age: 66
End: 2021-04-21

## 2021-04-21 DIAGNOSIS — M25.552 LEFT HIP PAIN: Primary | ICD-10-CM

## 2021-04-21 DIAGNOSIS — R26.2 DIFFICULTY WALKING: ICD-10-CM

## 2021-04-21 PROCEDURE — 97035 APP MDLTY 1+ULTRASOUND EA 15: CPT | Performed by: PHYSICAL THERAPIST

## 2021-04-21 PROCEDURE — 97140 MANUAL THERAPY 1/> REGIONS: CPT | Performed by: PHYSICAL THERAPIST

## 2021-04-21 NOTE — PROGRESS NOTES
Physical Therapy Daily Progress Note    Patient: Candy Garcia   : 1955  Diagnosis/ICD-10 Code:  Left hip pain [M25.552]  Referring practitioner: Eduardo Greco*  Date of Initial Visit: Type: THERAPY  Noted: 2021  Today's Date: 2021  Patient seen for 4 sessions           Subjective I was more sore after last session.  I feel I am getting stronger with the new exercises.  Sorry I am late.    Objective   See Exercise, Manual, and Modality Logs for complete treatment.   Reviewed forward versus lateral step up.  Shown how to use a massage roller to thigh.    Assessment/Plan    Gait is better after treatment today with less pain while walking out.       Timed:    Manual Therapy:    15     mins  10067;  Therapeutic Exercise:    0     mins  93089;     Neuromuscular Salvador:    0    mins  21815;    Therapeutic Activity:     5     mins  70104;     Gait Trainin     mins  86519;     Ultrasound:     10     mins  56847;    Electrical Stimulation:    0     mins  64235 ( );  Iontophoresis    0     mins 27115;  Aquatic Therapy    0     mins 72208;  Dry Needling              0     mins    Untimed:  Electrical Stimulation:    0     mins  50575 (MC );  Mechanical Traction:    0     mins  75037;     Timed Treatment:   30   mins   Total Treatment:     30   mins  Vickie Hooper, PT  Physical Therapist

## 2021-05-05 ENCOUNTER — TREATMENT (OUTPATIENT)
Dept: PHYSICAL THERAPY | Facility: CLINIC | Age: 66
End: 2021-05-05

## 2021-05-05 DIAGNOSIS — M25.552 LEFT HIP PAIN: Primary | ICD-10-CM

## 2021-05-05 DIAGNOSIS — R26.2 DIFFICULTY WALKING: ICD-10-CM

## 2021-05-05 PROCEDURE — 97035 APP MDLTY 1+ULTRASOUND EA 15: CPT | Performed by: PHYSICAL THERAPIST

## 2021-05-05 PROCEDURE — 97110 THERAPEUTIC EXERCISES: CPT | Performed by: PHYSICAL THERAPIST

## 2021-05-05 PROCEDURE — 97140 MANUAL THERAPY 1/> REGIONS: CPT | Performed by: PHYSICAL THERAPIST

## 2021-05-05 NOTE — PROGRESS NOTES
"30-Day / 10-Visit Progress Note         Patient: Candy Garcia   : 1955  Diagnosis/ICD-10 Code:  Left hip pain [M25.552]  Referring practitioner: Eduardo Greco*  Date of Initial Visit: Type: THERAPY  Noted: 2021  Today's Date: 2021  Patient seen for 5 sessions      Subjective:     Clinical Progress: improved  Home Program Compliance: Yes  Treatment has included:  therapeutic exercise, manual therapy and ultrasound, and therapeutic activity.    Subjective   Patient reports she is looking forward to getting the ultrasound today.    Objective     Strength/Myotome Testing      Left Hip   Planes of Motion   Flexion: 4-  Abduction: 4+  External rotation: 3+  Internal rotation: 3+      See Exercise, Manual, and Modality Logs for complete treatment.     Functional Outcome Score: LEFS: 36 ( last month)    MANUAL:  -STM to TFL with mild to moderate pressure. Strummed along muscle belly to improve tissue extensibility and reduce pain.     EXERCISES:  -TFL stretch in supine 3x30 sec  -Hamstring 90/90 nerve glide, 10x10 sec  -Bridges 10x B, 5x single leg  -Sidelying hip abduction with slight hip extension-15x  -2\" step ups x10   -Prone hip extension 10x    Assessment/Plan  Candy Garcia has been seen for 5 physical therapy sessions for L Hip pain due to greater trochanteric bursitis and psoas tendonitis.  Treatment has included therapeutic exercise, manual therapy, therapeutic activity and ultrasound. Progress to physical therapy goals is good. Patient has functional weakness which is most notable when she is negotiating stairs. She reports that her hip is much less tender but she still has some days that are better than others. She reports difficulty bending down to clean or golfing/hiking.  She will benefit from continued skilled physical therapy to address remaining impairments and functional limitations.      STGs to be completed within 30 days:  -Patient will demonstrate compliance and " independence with initial HEP (MET)  -Patient will increase L Hip ER strength to 4-/5 to help stabilize hip during gait (ONGOING)  -Patient will perform sit to stand transfer with equilateral WB and no UE assistance  (MET)  -Patient will ambulate household distances with <4/10 pain to allow patient to complete ADLs (MET< 3/10 pain)    LTGs to be completed within 90 days:  -Patient will increase L Hip ER strength to 4/5 to help increase ease with putting on shoes/socks  (ONGOING)  -Patient will complete community mobility 20 min or more with <2/10 pain to allow patient to walk for leisure  (ONGOING, can walk 20 but will have pain of 6-7/10)  -Patient will improve score on LEFS from 30 at eval to 50 or greater to improve quality of life  (ONGOING, 36/80)    Recommendations: Continue as planned  Timeframe: 1 month  Prognosis to achieve goals: good    PT Signature: Gabriella Montenegro PT      Based upon review of the patient's progress and continued therapy plan, it is my medical opinion that Candy Garcia should continue physical therapy treatment at [facility]  [address] [place] [address] [address] [place] [address] [phone].    Signature: __________________________________  Eduardo Rodrigues MD    Timed:  Manual Therapy:    10     mins  72926;  Therapeutic Exercise:    23     mins  35063;     Neuromuscular Salvador:        mins  99218;    Therapeutic Activity:          mins  14046;     Gait Training:           mins  25254;     Ultrasound:     10     mins  08337;    Electrical Stimulation:         mins  88747 ( );  Iontophoresis         mins 44849;  Dry Needling                   mins    Untimed:  Electrical Stimulation:         mins  43607 ( );  Mechanical Traction:         mins  53435;     Timed Treatment:   43   mins   Total Treatment:     43   mins

## 2021-05-10 ENCOUNTER — TREATMENT (OUTPATIENT)
Dept: PHYSICAL THERAPY | Facility: CLINIC | Age: 66
End: 2021-05-10

## 2021-05-10 DIAGNOSIS — R26.2 DIFFICULTY WALKING: ICD-10-CM

## 2021-05-10 DIAGNOSIS — M25.552 LEFT HIP PAIN: Primary | ICD-10-CM

## 2021-05-10 PROCEDURE — 97110 THERAPEUTIC EXERCISES: CPT | Performed by: PHYSICAL THERAPIST

## 2021-05-10 PROCEDURE — 97035 APP MDLTY 1+ULTRASOUND EA 15: CPT | Performed by: PHYSICAL THERAPIST

## 2021-05-10 PROCEDURE — 97140 MANUAL THERAPY 1/> REGIONS: CPT | Performed by: PHYSICAL THERAPIST

## 2021-05-10 NOTE — PROGRESS NOTES
Physical Therapy Daily Progress Note    Patient: Candy Garcia   : 1955  Diagnosis/ICD-10 Code:  Left hip pain [M25.552]  Referring practitioner: Eduardo Greco*  Date of Initial Visit: Type: THERAPY  Noted: 2021  Today's Date: 5/10/2021  Patient seen for 6 sessions           Subjective I felt good after last treatment and was able to walk and do stairs better but it does not last.    Objective   See Exercise, Manual, and Modality Logs for complete treatment.   Patient brought in her new TENS unit and I adjusted it to normal mode and it has a prior timed setting for 30 min that she can use.  I suggest she can use ice with it.    HEP for modified piriformis stretch but that feels more limited in anterior hip.  She does well with stretches for hip flexors and ITB.    Assessment/Plan    Candy Chung reports temporary improvement in her walking and stair mobility after therapy session but not lasting relief.  She has moderate tightness and tenderness extending from gluteals to thigh.  She is able to better support her balance on single left leg without pelvic drop.        Timed:    Manual Therapy:    25     mins  48522;  Therapeutic Exercise:    8     mins  98282;     Neuromuscular Salvador:    0    mins  75607;    Therapeutic Activity:     0     mins  02899;     Gait Trainin     mins  51711;     Ultrasound:     10     mins  74307;    Electrical Stimulation:    0     mins  84692 ( );  Iontophoresis    0     mins 86745;  Aquatic Therapy    0     mins 84407;  Dry Needling              0     mins    Untimed:  Electrical Stimulation:    0     mins  47013 ( );  Mechanical Traction:    0     mins  26420;     Timed Treatment:   43   mins   Total Treatment:     43   mins  Vickie Hooper, PT  Physical Therapist

## 2021-05-14 ENCOUNTER — TREATMENT (OUTPATIENT)
Dept: PHYSICAL THERAPY | Facility: CLINIC | Age: 66
End: 2021-05-14

## 2021-05-14 DIAGNOSIS — M25.552 LEFT HIP PAIN: Primary | ICD-10-CM

## 2021-05-14 DIAGNOSIS — R26.2 DIFFICULTY WALKING: ICD-10-CM

## 2021-05-14 PROCEDURE — 97035 APP MDLTY 1+ULTRASOUND EA 15: CPT | Performed by: PHYSICAL THERAPIST

## 2021-05-14 PROCEDURE — 97140 MANUAL THERAPY 1/> REGIONS: CPT | Performed by: PHYSICAL THERAPIST

## 2021-05-14 NOTE — PROGRESS NOTES
Physical Therapy Daily Progress Note    Patient: Candy Garcia   : 1955  Diagnosis/ICD-10 Code:  Left hip pain [M25.552]  Referring practitioner: Eduardo Greco*  Date of Initial Visit: Type: THERAPY  Noted: 2021  Today's Date: 2021  Patient seen for 7 sessions           Subjective I was able to climb the stairs without using my hand on rail     Objective   See Exercise, Manual, and Modality Logs for complete treatment.       Assessment/Plan    Improved stair mobility with less pain in lateral gluteals.  Today attention was focused to anterior and lateral thigh that feels more sore than posterior lateral.       Timed:    Manual Therapy:    30     mins  74941;  Therapeutic Exercise:    0     mins  42082;     Neuromuscular Salvador:    0    mins  10277;    Therapeutic Activity:     00     mins  26294;     Gait Trainin     mins  03243;     Ultrasound:     10     mins  85980;    Electrical Stimulation:    0     mins  92695 (MC );  Iontophoresis    0     mins 66383;  Aquatic Therapy    0     mins 20376;  Dry Needling              0     mins    Untimed:  Electrical Stimulation:    0     mins  70159 (MC );  Mechanical Traction:    0     mins  42449;     Timed Treatment:   40   mins   Total Treatment:     40   mins  Vickie Hooper PT  Physical Therapist

## 2021-05-17 ENCOUNTER — TREATMENT (OUTPATIENT)
Dept: PHYSICAL THERAPY | Facility: CLINIC | Age: 66
End: 2021-05-17

## 2021-05-17 DIAGNOSIS — M25.552 LEFT HIP PAIN: Primary | ICD-10-CM

## 2021-05-17 DIAGNOSIS — R26.2 DIFFICULTY WALKING: ICD-10-CM

## 2021-05-17 PROCEDURE — 97035 APP MDLTY 1+ULTRASOUND EA 15: CPT | Performed by: PHYSICAL THERAPIST

## 2021-05-17 PROCEDURE — 97140 MANUAL THERAPY 1/> REGIONS: CPT | Performed by: PHYSICAL THERAPIST

## 2021-05-17 NOTE — PROGRESS NOTES
Physical Therapy Daily Progress Note    Patient: Candy Garcia   : 1955  Diagnosis/ICD-10 Code:  Left hip pain [M25.552]  Referring practitioner: Eduardo Greco*  Date of Initial Visit: Type: THERAPY  Noted: 2021  Today's Date: 2021  Patient seen for 8 sessions           Subjective I was sore for a day but then it continues to feel much better with walking and stairs     Objective   See Exercise, Manual, and Modality Logs for complete treatment.   Reviewed stretching for hamstrings, hip flexors/quads and ITB that all feel good.     Assessment/Plan    Not as taut and tender to hip / thigh region today showing good response to treatment and better hold between sessions.        Timed:    Manual Therapy:    30     mins  66162;  Therapeutic Exercise:    3     mins  39402;     Neuromuscular Salvador:    0    mins  50722;    Therapeutic Activity:     0     mins  34690;     Gait Trainin     mins  67941;     Ultrasound:     10     mins  51405;    Electrical Stimulation:    0     mins  02213 ( );  Iontophoresis    0     mins 09164;  Aquatic Therapy    0     mins 32624;  Dry Needling              0     mins    Untimed:  Electrical Stimulation:    0     mins  66984 (MC );  Mechanical Traction:    0     mins  27530;     Timed Treatment:   43   mins   Total Treatment:     43   mins  Vickie Hooper PT  Physical Therapist

## 2021-05-21 ENCOUNTER — TREATMENT (OUTPATIENT)
Dept: PHYSICAL THERAPY | Facility: CLINIC | Age: 66
End: 2021-05-21

## 2021-05-21 DIAGNOSIS — R26.2 DIFFICULTY WALKING: ICD-10-CM

## 2021-05-21 DIAGNOSIS — M25.552 LEFT HIP PAIN: Primary | ICD-10-CM

## 2021-05-21 PROCEDURE — 97035 APP MDLTY 1+ULTRASOUND EA 15: CPT | Performed by: PHYSICAL THERAPIST

## 2021-05-21 PROCEDURE — 97140 MANUAL THERAPY 1/> REGIONS: CPT | Performed by: PHYSICAL THERAPIST

## 2021-05-21 NOTE — PROGRESS NOTES
Physical Therapy Daily Progress Note    Patient: Candy Garcia   : 1955  Diagnosis/ICD-10 Code:  Left hip pain [M25.552]  Referring practitioner: Eduardo Greco*  Date of Initial Visit: Type: THERAPY  Noted: 2021  Today's Date: 2021  Patient seen for 9 sessions           Subjective Overall much better but a little more sore in front of thigh today.  It hurts if I twist my leg getting in / out car.     Objective   See Exercise, Manual, and Modality Logs for complete treatment.       Assessment/Plan    Moderate tightness and tenderness extends further down vastus lateralis towards knee. Overall pain is less with walking and stairs and exercises are easier with increased strength and less pain.        Timed:    Manual Therapy:    30     mins  13054;  Therapeutic Exercise:    0     mins  53702;     Neuromuscular Salvador:    0    mins  26231;    Therapeutic Activity:     0     mins  71940;     Gait Trainin     mins  29651;     Ultrasound:     10     mins  01510;    Electrical Stimulation:    0     mins  00721 ( );  Iontophoresis    0     mins 12875;  Aquatic Therapy    0     mins 47673;  Dry Needling              0     mins    Untimed:  Electrical Stimulation:    0     mins  47083 (MC );  Mechanical Traction:    0     mins  23044;     Timed Treatment:   40   mins   Total Treatment:     40   mins  Vickie Hooper PT  Physical Therapist

## 2021-05-26 LAB
ALBUMIN SERPL-MCNC: 4.3 G/DL (ref 3.5–5.2)
ALBUMIN/GLOB SERPL: 2.3 G/DL
ALP SERPL-CCNC: 179 U/L (ref 39–117)
ALT SERPL-CCNC: 70 U/L (ref 1–33)
APPEARANCE UR: CLEAR
AST SERPL-CCNC: 37 U/L (ref 1–32)
BACTERIA #/AREA URNS HPF: NORMAL /HPF
BASOPHILS # BLD AUTO: 0.04 10*3/MM3 (ref 0–0.2)
BASOPHILS NFR BLD AUTO: 1 % (ref 0–1.5)
BILIRUB SERPL-MCNC: 0.3 MG/DL (ref 0–1.2)
BILIRUB UR QL STRIP: NEGATIVE
BUN SERPL-MCNC: 15 MG/DL (ref 8–23)
BUN/CREAT SERPL: 17.9 (ref 7–25)
CALCIUM SERPL-MCNC: 9.9 MG/DL (ref 8.6–10.5)
CASTS URNS MICRO: NORMAL
CHLORIDE SERPL-SCNC: 103 MMOL/L (ref 98–107)
CHOLEST SERPL-MCNC: 200 MG/DL (ref 0–200)
CO2 SERPL-SCNC: 28.5 MMOL/L (ref 22–29)
COLOR UR: YELLOW
CREAT SERPL-MCNC: 0.84 MG/DL (ref 0.57–1)
EOSINOPHIL # BLD AUTO: 0.15 10*3/MM3 (ref 0–0.4)
EOSINOPHIL NFR BLD AUTO: 3.6 % (ref 0.3–6.2)
EPI CELLS #/AREA URNS HPF: NORMAL /HPF
ERYTHROCYTE [DISTWIDTH] IN BLOOD BY AUTOMATED COUNT: 13.8 % (ref 12.3–15.4)
GLOBULIN SER CALC-MCNC: 1.9 GM/DL
GLUCOSE SERPL-MCNC: 90 MG/DL (ref 65–99)
GLUCOSE UR QL: NEGATIVE
HCT VFR BLD AUTO: 43.7 % (ref 34–46.6)
HDLC SERPL-MCNC: 71 MG/DL (ref 40–60)
HGB BLD-MCNC: 13.9 G/DL (ref 12–15.9)
HGB UR QL STRIP: NEGATIVE
IMM GRANULOCYTES # BLD AUTO: 0 10*3/MM3 (ref 0–0.05)
IMM GRANULOCYTES NFR BLD AUTO: 0 % (ref 0–0.5)
IRON SATN MFR SERPL: 14 % (ref 20–50)
IRON SERPL-MCNC: 59 MCG/DL (ref 37–145)
KETONES UR QL STRIP: NEGATIVE
LDLC SERPL CALC-MCNC: 112 MG/DL (ref 0–100)
LDLC/HDLC SERPL: 1.54 {RATIO}
LEUKOCYTE ESTERASE UR QL STRIP: ABNORMAL
LYMPHOCYTES # BLD AUTO: 1.23 10*3/MM3 (ref 0.7–3.1)
LYMPHOCYTES NFR BLD AUTO: 29.3 % (ref 19.6–45.3)
MCH RBC QN AUTO: 28.3 PG (ref 26.6–33)
MCHC RBC AUTO-ENTMCNC: 31.8 G/DL (ref 31.5–35.7)
MCV RBC AUTO: 89 FL (ref 79–97)
MONOCYTES # BLD AUTO: 0.34 10*3/MM3 (ref 0.1–0.9)
MONOCYTES NFR BLD AUTO: 8.1 % (ref 5–12)
NEUTROPHILS # BLD AUTO: 2.44 10*3/MM3 (ref 1.7–7)
NEUTROPHILS NFR BLD AUTO: 58 % (ref 42.7–76)
NITRITE UR QL STRIP: NEGATIVE
NRBC BLD AUTO-RTO: 0 /100 WBC (ref 0–0.2)
PH UR STRIP: 6 [PH] (ref 5–8)
PLATELET # BLD AUTO: 240 10*3/MM3 (ref 140–450)
POTASSIUM SERPL-SCNC: 5.3 MMOL/L (ref 3.5–5.2)
PROT SERPL-MCNC: 6.2 G/DL (ref 6–8.5)
PROT UR QL STRIP: NEGATIVE
RBC # BLD AUTO: 4.91 10*6/MM3 (ref 3.77–5.28)
RBC #/AREA URNS HPF: NORMAL /HPF
SODIUM SERPL-SCNC: 140 MMOL/L (ref 136–145)
SP GR UR: 1.02 (ref 1–1.03)
T4 FREE SERPL-MCNC: 0.96 NG/DL (ref 0.93–1.7)
TIBC SERPL-MCNC: 434 MCG/DL
TRIGL SERPL-MCNC: 98 MG/DL (ref 0–150)
TSH SERPL DL<=0.005 MIU/L-ACNC: 3.14 UIU/ML (ref 0.27–4.2)
UIBC SERPL-MCNC: 375 MCG/DL (ref 112–346)
UROBILINOGEN UR STRIP-MCNC: ABNORMAL MG/DL
VLDLC SERPL CALC-MCNC: 17 MG/DL (ref 5–40)
WBC # BLD AUTO: 4.2 10*3/MM3 (ref 3.4–10.8)
WBC #/AREA URNS HPF: NORMAL /HPF

## 2021-05-28 ENCOUNTER — OFFICE VISIT (OUTPATIENT)
Dept: FAMILY MEDICINE CLINIC | Facility: CLINIC | Age: 66
End: 2021-05-28

## 2021-05-28 VITALS
HEIGHT: 64 IN | BODY MASS INDEX: 33.02 KG/M2 | SYSTOLIC BLOOD PRESSURE: 120 MMHG | HEART RATE: 78 BPM | OXYGEN SATURATION: 97 % | RESPIRATION RATE: 18 BRPM | WEIGHT: 193.4 LBS | DIASTOLIC BLOOD PRESSURE: 78 MMHG | TEMPERATURE: 97.5 F

## 2021-05-28 DIAGNOSIS — I10 ESSENTIAL HYPERTENSION: ICD-10-CM

## 2021-05-28 DIAGNOSIS — E03.9 HYPOTHYROIDISM, UNSPECIFIED TYPE: ICD-10-CM

## 2021-05-28 DIAGNOSIS — E78.5 HYPERLIPIDEMIA LDL GOAL <70: Primary | ICD-10-CM

## 2021-05-28 PROCEDURE — 99214 OFFICE O/P EST MOD 30 MIN: CPT | Performed by: INTERNAL MEDICINE

## 2021-05-28 NOTE — PROGRESS NOTES
Subjective   Candy Garcia is a 65 y.o. female. Patient is here today for   Chief Complaint   Patient presents with   • Hyperlipidemia     LAB FU   • Hypothyroidism          Vitals:    05/28/21 1013   BP: 120/78   Pulse: 78   Resp: 18   Temp: 97.5 °F (36.4 °C)   SpO2: 97%     Body mass index is 33.18 kg/m².      Past Medical History:   Diagnosis Date   • Acute pansinusitis    • Allergic    • Arthritis 8/7/2018   • Asthma    • Depression    • Disease of thyroid gland    • GERD (gastroesophageal reflux disease)    • Hip pain, acute, left    • Hypercholesterolemia    • Hyperlipidemia    • Hypertension    • Hypothyroidism    • Irritable bowel syndrome 1/19/2016   • Raynaud's phenomenon    • Right-sided lacunar infarction (CMS/HCC) 12/1/2016   • Stroke (CMS/[facility])    • Tremor 1/19/2016      Allergies   Allergen Reactions   • Penicillins Anaphylaxis     Occurred when pt was 16 years old   • Atorvastatin Myalgia   • Rosuvastatin Myalgia      Social History     Socioeconomic History   • Marital status:      Spouse name: Not on file   • Number of children: Not on file   • Years of education: Not on file   • Highest education level: Not on file   Tobacco Use   • Smoking status: Never Smoker   • Smokeless tobacco: Never Used   Substance and Sexual Activity   • Alcohol use: Yes     Comment: daily caffiene   • Drug use: Never   • Sexual activity: Not Currently     Birth control/protection: Post-menopausal        Current Outpatient Medications:   •  albuterol (PROVENTIL HFA;VENTOLIN HFA) 108 (90 BASE) MCG/ACT inhaler, Inhale 1-2 puffs every 4 (four) hours as needed for wheezing., Disp: , Rfl:   •  aspirin  MG tablet, TAKE ONE TABLET BY MOUTH DAILY, Disp: 90 tablet, Rfl: 1  •  cetirizine (zyrTEC) 5 MG tablet, Take 5 mg by mouth Daily., Disp: , Rfl:   •  dicyclomine (BENTYL) 10 MG capsule, TAKE 1 CAPSULE BY MOUTH FOUR TIMES A DAY BEFORE MEALS AND AT BEDTIME, Disp: 100 capsule, Rfl: 2  •  DULoxetine (CYMBALTA) 60 MG  capsule, TAKE 1 CAPSULE BY MOUTH EVERY DAY, Disp: 90 capsule, Rfl: 1  •  levothyroxine (Synthroid) 100 MCG tablet, Take 1 tablet by mouth Daily., Disp: 90 tablet, Rfl: 3  •  lisinopril-hydrochlorothiazide (PRINZIDE,ZESTORETIC) 20-12.5 MG per tablet, TAKE 1 TABLET BY MOUTH EVERY DAY, Disp: 90 tablet, Rfl: 3  •  meloxicam (MOBIC) 7.5 MG tablet, TAKE 1 TABLET BY MOUTH EVERY DAY, Disp: 30 tablet, Rfl: 5  •  Multiple Vitamins-Minerals (CENTRUM SILVER 50+WOMEN PO), Take  by mouth Daily., Disp: , Rfl:   •  omeprazole (PriLOSEC) 20 MG capsule, Take 2 tablets by mouth every morning., Disp: , Rfl:   •  rosuvastatin (CRESTOR) 10 MG tablet, Take 1 tablet by mouth every night at bedtime., Disp: 30 tablet, Rfl: 2  •  sulfamethoxazole-trimethoprim (Bactrim DS) 800-160 MG per tablet, Take 1 tablet by mouth 2 (Two) Times a Day., Disp: 20 tablet, Rfl: 0     Objective     This patient is here to follow-up on labs from last week.  Found out that she could not tolerate Crestor 10 mg once daily.  She stopped it about a month ago.  He causes muscle aches and pains.    He is following up with physical therapy for left hip pain and she feels that that has helped considerably.    She has a past history of a Stroke (right-sided lacunar infarction).  Her target LDL cholesterol is less than 70.    She has tried atorvastatin and was unable to tolerate that as well.       Review of Systems   Constitutional: Negative.    HENT: Negative.    Respiratory: Negative.    Cardiovascular: Negative.    Musculoskeletal: Negative.    Psychiatric/Behavioral: Negative.        Physical Exam  Vitals and nursing note reviewed.   Constitutional:       Appearance: Normal appearance. She is obese.      Comments: Pleasant, neatly groomed, no distress.   Cardiovascular:      Rate and Rhythm: Regular rhythm.      Heart sounds: Normal heart sounds. No murmur heard.   No gallop.    Pulmonary:      Effort: No respiratory distress.      Breath sounds: Normal breath sounds.  No wheezing or rales.   Neurological:      Mental Status: She is alert and oriented to person, place, and time.   Psychiatric:         Mood and Affect: Mood normal.         Behavior: Behavior normal.         Thought Content: Thought content normal.           Problems Addressed this Visit        Cardiac and Vasculature    Essential hypertension    Hyperlipidemia LDL goal <70 - Primary       Endocrine and Metabolic    Hypothyroidism      Diagnoses       Codes Comments    Hyperlipidemia LDL goal <70    -  Primary ICD-10-CM: E78.5  ICD-9-CM: 272.4     Essential hypertension     ICD-10-CM: I10  ICD-9-CM: 401.9     Hypothyroidism, unspecified type     ICD-10-CM: E03.9  ICD-9-CM: 244.9             PLAN  She and I reviewed her labs.  Her hypothyroidism is appropriately replaced.    Has been unable to tolerate either atorvastatin or rosuvastatin.  Gave her samples of 2 mg Livalo.  I asked her to take 1 tablet nightly.  I gave her enough the last 6 weeks.  I asked her to follow-up in about 5 weeks to get a lipid profile and a CMP.    Her target LDL cholesterol is less than 70.    If she cannot tolerate this medication, or if she can and her LDL target is not achieved, I would add Praluent.  No follow-ups on file.

## 2021-06-02 ENCOUNTER — TREATMENT (OUTPATIENT)
Dept: PHYSICAL THERAPY | Facility: CLINIC | Age: 66
End: 2021-06-02

## 2021-06-02 DIAGNOSIS — M25.552 LEFT HIP PAIN: Primary | ICD-10-CM

## 2021-06-02 DIAGNOSIS — R26.2 DIFFICULTY WALKING: ICD-10-CM

## 2021-06-02 PROCEDURE — 97140 MANUAL THERAPY 1/> REGIONS: CPT | Performed by: PHYSICAL THERAPIST

## 2021-06-02 PROCEDURE — 97035 APP MDLTY 1+ULTRASOUND EA 15: CPT | Performed by: PHYSICAL THERAPIST

## 2021-06-02 NOTE — PROGRESS NOTES
30-Day / 10-Visit Progress Note         Patient: Candy Garcia   : 1955  Diagnosis/ICD-10 Code:  Left hip pain [M25.552]  Referring practitioner: Eduardo Greco*  Date of Initial Visit: Type: THERAPY  Noted: 2021  Today's Date: 2021  Patient seen for 10 sessions      Subjective:     Clinical Progress: improved  Home Program Compliance: Yes  Treatment has included:  therapeutic exercise, manual therapy, ultrasound and patient education with home exercise program     Subjective Candy Chung reports she has continued to do well. She was able to come up the stairs without using the rail 2-3/10 pain.    Objective          Palpation   Left   Hypertonic in the gluteus minna, gluteus medius, iliopsoas and TFL.   Tenderness of the gluteus minna, gluteus medius, iliopsoas and TFL.     Tenderness     Left Hip   Tenderness in the greater trochanter.     Ambulation     Ambulation: Stairs   Ascend stairs: independent  Pattern: reciprocal  Railings: without rails  Descend stairs: independent  Pattern: reciprocal  Railings: without rails    Additional Stairs Ambulation Details  Mild pain and weakness on left side     Functional Assessment     Single Leg Stance   Left: 10 (Better stability ) seconds    Comments  Not able to perform lateral step up on full step height         See Exercise, Manual, and Modality Logs for complete treatment.   Advanced step ups  Discussed not being aggressive with activity   May increase walking steps 10%      Assessment & Plan     Assessment  Impairments: abnormal gait, abnormal muscle firing, abnormal or restricted ROM, activity intolerance, impaired balance, impaired physical strength, lacks appropriate home exercise program and pain with function  Assessment details: Candy Garcia is a 65 y.o. year-old female referred to physical therapy for L Hip pain due to greater trochanteric bursitis and psoas tendonitis. She has been seen for 10 physical therapy sessions and has  progressed very well.  She reports decreased pain in the left hip that has enabled her to walk and climb stairs with better ability.  She still has some mild pain and tenderness to the left hip.    Barriers to therapy: none identified  Prognosis: good  Functional Limitations: lifting, sleeping, walking, uncomfortable because of pain, sitting and standing  Goals  Plan Goals: STGs to be completed within 30 days:  -Patient will demonstrate compliance and independence with initial HEP  Met  -Patient will increase L Hip ER strength to 4-/5 to help stabilize hip during gait  Met  -Patient will perform sit to stand transfer with equilateral WB and no UE assistance  Met  -Patient will ambulate household distances with <4/10 pain to allow patient to complete ADLs  Met  LTGs to be completed within 90 days:  -Patient will increase L Hip ER strength to 4/5 to help increase ease with putting on shoes/socks  Progressing   -Patient will complete community mobility 20 min or more with <2/10 pain to allow patient to walk for leisure  Progressing   -Patient will improve score on LEFS from 30 at eval to 50 or greater to improve quality of life  Ongoing     Plan  Therapy options: will be seen for skilled physical therapy services  Planned modality interventions: TENS, ultrasound and electrical stimulation/Russian stimulation  Planned therapy interventions: joint mobilization, stretching, strengthening, therapeutic activities, transfer training, postural training, manual therapy, ADL retraining, balance/weight-bearing training, dressing changes, flexibility, functional ROM exercises, gait training, home exercise program, neuromuscular re-education and motor coordination training  Other planned therapy interventions: Aquatic Therapy  Frequency: 36 visits.  Treatment plan discussed with: patient  Plan details: Gait training, stairs, Balance, Knee ROM/strength, LE stability           Recommendations: Continue as planned  Timeframe: 1  month  Prognosis to achieve goals: good    PT Signature: Vickie Hooper, PT      Based upon review of the patient's progress and continued therapy plan, it is my medical opinion that Candy Garcia should continue physical therapy treatment at [facility]  [address] [place] [address] [address]   [place] [address]  [phone].    Signature: __________________________________  Eduardo Rodrigues MD    Timed:  Manual Therapy:    30     mins  60700;  Therapeutic Exercise:    8     mins  08488;     Neuromuscular Salvador:    0    mins  75069;    Therapeutic Activity:     00     mins  55309;     Gait Trainin     mins  46436;     Ultrasound:     10     mins  59417;    Electrical Stimulation:    0     mins  79342 ( );  Iontophoresis    0     mins 83117;  Dry Needling              0     mins    Untimed:  Electrical Stimulation:    0     mins  50648 ( );  Mechanical Traction:    0     mins  09852;     Timed Treatment:   48   mins   Total Treatment:     48   mins

## 2021-06-09 ENCOUNTER — TREATMENT (OUTPATIENT)
Dept: PHYSICAL THERAPY | Facility: CLINIC | Age: 66
End: 2021-06-09

## 2021-06-09 DIAGNOSIS — M25.552 LEFT HIP PAIN: Primary | ICD-10-CM

## 2021-06-09 DIAGNOSIS — R26.2 DIFFICULTY WALKING: ICD-10-CM

## 2021-06-09 PROCEDURE — 97140 MANUAL THERAPY 1/> REGIONS: CPT | Performed by: PHYSICAL THERAPIST

## 2021-06-09 PROCEDURE — 97035 APP MDLTY 1+ULTRASOUND EA 15: CPT | Performed by: PHYSICAL THERAPIST

## 2021-06-09 NOTE — PROGRESS NOTES
Physical Therapy Daily Progress Note    Patient: Candy Garcia   : 1955  Diagnosis/ICD-10 Code:  Left hip pain [M25.552]  Referring practitioner: Eduardo Greco*  Date of Initial Visit: Type: THERAPY  Noted: 2021  Today's Date: 2021  Patient seen for 11 sessions           Subjective Candy Chung reports that [place] she did so well.  She did report some greater difficulty climbing stairs in today than last week.    Objective   See Exercise, Manual, and Modality Logs for complete treatment.       Assessment/Plan    Candy Chung is reporting improved walking and stair tolerance following physical therapy treatment with good relief of symptoms for about 1 week.  She is very happy with her progress and reduced pain level.       Timed:    Manual Therapy:    30     mins  76544;  Therapeutic Exercise:    0     mins  92406;     Neuromuscular Salvador:    0    mins  41970;    Therapeutic Activity:     0     mins  44089;     Gait Trainin     mins  31233;     Ultrasound:     10     mins  26610;    Electrical Stimulation:    0     mins  68439 ( );  Iontophoresis    0     mins 14869;  Aquatic Therapy    0     mins 21022;  Dry Needling              0     mins    Untimed:  Electrical Stimulation:    0     mins  14342 ( );  Mechanical Traction:    0     mins  51021;     Timed Treatment:   40   mins   Total Treatment:     40   mins  Vickie Hooper, PT  Physical Therapist

## 2021-06-16 ENCOUNTER — TREATMENT (OUTPATIENT)
Dept: PHYSICAL THERAPY | Facility: CLINIC | Age: 66
End: 2021-06-16

## 2021-06-16 ENCOUNTER — TELEPHONE (OUTPATIENT)
Dept: FAMILY MEDICINE CLINIC | Facility: CLINIC | Age: 66
End: 2021-06-16

## 2021-06-16 DIAGNOSIS — M25.552 LEFT HIP PAIN: Primary | ICD-10-CM

## 2021-06-16 DIAGNOSIS — R26.2 DIFFICULTY WALKING: ICD-10-CM

## 2021-06-16 PROCEDURE — 97035 APP MDLTY 1+ULTRASOUND EA 15: CPT | Performed by: PHYSICAL THERAPIST

## 2021-06-16 PROCEDURE — 97140 MANUAL THERAPY 1/> REGIONS: CPT | Performed by: PHYSICAL THERAPIST

## 2021-06-16 NOTE — TELEPHONE ENCOUNTER
PATIENT STATES THAT LIVLIALO IS CAUSING PAIN IN LEGS LIKE PREVIOUS MEDIATIONS. REPORT CHANGE OF PHARMACY.    PLEASE ADVISE [phone]     [facility] [place] [address] - [place] [address] [facility] [address] [facility] - [phone] [facility] [phone]   [phone]

## 2021-06-16 NOTE — PROGRESS NOTES
Physical Therapy Daily Progress Note    Patient: Candy Garcia   : 1955  Diagnosis/ICD-10 Code:  Left hip pain [M25.552]  Referring practitioner: Eduardo Greco*  Date of Initial Visit: Type: THERAPY  Noted: 2021  Today's Date: 2021  Patient seen for 12 sessions           Subjective Sore after last session for a few days but I am so much better and was able to walk steeper hill in my driveway    Objective   See Exercise, Manual, and Modality Logs for complete treatment.       Assessment/Plan    Not as tight in left thigh as last week with most tightness mid ITB that loosed up well with therapy   Walking tolerance continues to improve on even surfaces        Timed:    Manual Therapy:    30     mins  79556;  Therapeutic Exercise:    0     mins  77730;     Neuromuscular Salvador:    0    mins  78830;    Therapeutic Activity:     0     mins  58545;     Gait Trainin     mins  04896;     Ultrasound:     10     mins  24642;    Electrical Stimulation:    0     mins  09434 ( );  Iontophoresis    0     mins 91458;  Aquatic Therapy    0     mins 34242;  Dry Needling              0     mins    Untimed:  Electrical Stimulation:    0     mins  21202 (MC );  Mechanical Traction:    0     mins  52371;     Timed Treatment:   40   mins   Total Treatment:     40   mins  Vickie Hooper, PT  Physical Therapist

## 2021-06-17 NOTE — TELEPHONE ENCOUNTER
PATIENT THINKS SOMEONE TRIED TO CALL HER ABOUT THIS, BUT SHE COULDN'T HEAR WHOEVER CALLED HER. PLEASE CALL HER BACK

## 2021-06-18 RX ORDER — EVOLOCUMAB 140 MG/ML
140 INJECTION, SOLUTION SUBCUTANEOUS
Qty: 2 ML | Refills: 11 | Status: SHIPPED | OUTPATIENT
Start: 2021-06-18 | End: 2021-06-21 | Stop reason: SDUPTHER

## 2021-06-18 RX ORDER — ALIROCUMAB 75 MG/ML
75 INJECTION, SOLUTION SUBCUTANEOUS
Qty: 2 PEN | Refills: 11 | Status: SHIPPED | OUTPATIENT
Start: 2021-06-18 | End: 2021-06-18

## 2021-06-18 NOTE — TELEPHONE ENCOUNTER
Per Dr. Lagos. It is ok for patient to stop the Livlialo. He will send in [facility] for her cholesterol. Patient understands. Nothig further needed.      Thanks,  Dale

## 2021-06-21 NOTE — TELEPHONE ENCOUNTER
Caller: Candy Garcia    Relationship: Self    Best call back number: [phone] (M)    Medication needed:   Evolocumab (Repatha) solution prefilled syringe injection    When do you need the refill by:     What additional details did the patient provide when requesting the medication: PATIENT CALLED AND STATES THAT HER PRESCRIPTION WAS SENT TO THE WRONG PHARMACY. PATIENT WOULD LIKE TO HAVE TO GO TO [facility].     Does the patient have less than a 3 day supply:  [] Yes  [] No    What is the patient's preferred pharmacy:    [facility] [address] [facility] [address] AT [facility] & [facility] [phone] [facility] [phone] FX      THANKS

## 2021-06-22 RX ORDER — EVOLOCUMAB 140 MG/ML
140 INJECTION, SOLUTION SUBCUTANEOUS
Qty: 2 ML | Refills: 11 | Status: SHIPPED | OUTPATIENT
Start: 2021-06-22 | End: 2022-05-18

## 2021-06-23 ENCOUNTER — TREATMENT (OUTPATIENT)
Dept: PHYSICAL THERAPY | Facility: CLINIC | Age: 66
End: 2021-06-23

## 2021-06-23 DIAGNOSIS — R26.2 DIFFICULTY WALKING: ICD-10-CM

## 2021-06-23 DIAGNOSIS — M25.552 LEFT HIP PAIN: Primary | ICD-10-CM

## 2021-06-23 PROCEDURE — 97035 APP MDLTY 1+ULTRASOUND EA 15: CPT | Performed by: PHYSICAL THERAPIST

## 2021-06-23 PROCEDURE — 97140 MANUAL THERAPY 1/> REGIONS: CPT | Performed by: PHYSICAL THERAPIST

## 2021-06-23 NOTE — PROGRESS NOTES
Physical Therapy Daily Progress Note    Patient: Candy Garcia   : 1955  Diagnosis/ICD-10 Code:  Left hip pain [M25.552]  Referring practitioner: Eduardo Greco*  Date of Initial Visit: Type: THERAPY  Noted: 2021  Today's Date: 2021  Patient seen for 13 sessions           Subjective I am able to walk a mile but pain is 2-3/10 going up stairs after walk.    Objective   See Exercise, Manual, and Modality Logs for complete treatment.   Recommend stretching after walk     Assessment/Plan    Decreased muscle tightness and tenderness in the left hip and thigh region.   Walking and stair tolerance overall still much better.  She is able to sleep a little now on her left side.        Timed:    Manual Therapy:    30     mins  82557;  Therapeutic Exercise:    0     mins  91554;     Neuromuscular Salvador:    0    mins  04093;    Therapeutic Activity:     0     mins  34226;     Gait Trainin     mins  32148;     Ultrasound:     10     mins  87502;    Electrical Stimulation:    0     mins  08635 ( );  Iontophoresis    0     mins 67563;  Aquatic Therapy    0     mins 56229;  Dry Needling              0     mins    Untimed:  Electrical Stimulation:    0     mins  88615 (MC );  Mechanical Traction:    0     mins  64656;     Timed Treatment:   40   mins   Total Treatment:     40   mins  Vickie Hooper PT  Physical Therapist

## 2021-06-30 ENCOUNTER — TREATMENT (OUTPATIENT)
Dept: PHYSICAL THERAPY | Facility: CLINIC | Age: 66
End: 2021-06-30

## 2021-06-30 ENCOUNTER — TELEPHONE (OUTPATIENT)
Dept: FAMILY MEDICINE CLINIC | Facility: CLINIC | Age: 66
End: 2021-06-30

## 2021-06-30 DIAGNOSIS — M25.552 LEFT HIP PAIN: Primary | ICD-10-CM

## 2021-06-30 DIAGNOSIS — R26.2 DIFFICULTY WALKING: ICD-10-CM

## 2021-06-30 PROCEDURE — 97035 APP MDLTY 1+ULTRASOUND EA 15: CPT | Performed by: PHYSICAL THERAPIST

## 2021-06-30 PROCEDURE — 97140 MANUAL THERAPY 1/> REGIONS: CPT | Performed by: PHYSICAL THERAPIST

## 2021-06-30 NOTE — PROGRESS NOTES
Physical Therapy Daily Progress Note    Patient: Candy Garcia   : 1955  Diagnosis/ICD-10 Code:  Left hip pain [M25.552]  Referring practitioner: Eduardo Greco*  Date of Initial Visit: Type: THERAPY  Noted: 2021  Today's Date: 2021  Patient seen for 14 sessions           Subjective Stretching after walking does help reduce pain before climbing the stairs.     Objective   See Exercise, Manual, and Modality Logs for complete treatment.       Assessment/Plan    Candy Chung is progressing well with her functional mobility.  She is walking 1 mile regularly and has reduced pain going up stairs with regular stretching.  She does still have some muscle tenderness and tautness but it is responding well this month to weekly sessions.  Next visit is extended 2 weeks. Recert next visit.       Timed:    Manual Therapy:    30     mins  81277;  Therapeutic Exercise:    0     mins  20194;     Neuromuscular Salvador:    0    mins  33909;    Therapeutic Activity:     0     mins  59000;     Gait Trainin     mins  45759;     Ultrasound:     10     mins  95635;    Electrical Stimulation:    0     mins  25748 ( );  Iontophoresis    0     mins 79034;  Aquatic Therapy    0     mins 54377;  Dry Needling              0     mins    Untimed:  Electrical Stimulation:    0     mins  07597 ( );  Mechanical Traction:    0     mins  25882;     Timed Treatment:   40   mins   Total Treatment:     40   mins  Vickie Hooper, PT  Physical Therapist

## 2021-06-30 NOTE — TELEPHONE ENCOUNTER
Caller: Candy Garcia    Relationship: Self    Best call back number: [phone] (M)    What test/procedure requested: REPATHA INJECTABLE    When is it needed: ASAP    Where is the test/procedure going to be performed: AT HOME EVERY TWO WEEKS    Additional information or concerns: PATIENT STATES INSURANCE IS CHARGING HER OVER $400 BECAUSE DRUG IS NOT ON THEIR FORMULARY AND [facility] IS FAXING OVER AN EXCEPTION FORM FOR DR CORONA TO FILL OUT TO APPROVE THE MEDICATION TO LOWER THE COST OF THE MEDICATION FOR THE PATIENT.

## 2021-07-09 ENCOUNTER — OFFICE VISIT (OUTPATIENT)
Dept: FAMILY MEDICINE CLINIC | Facility: CLINIC | Age: 66
End: 2021-07-09

## 2021-07-09 VITALS
TEMPERATURE: 98 F | BODY MASS INDEX: 33.49 KG/M2 | WEIGHT: 196.2 LBS | HEIGHT: 64 IN | DIASTOLIC BLOOD PRESSURE: 76 MMHG | OXYGEN SATURATION: 97 % | HEART RATE: 78 BPM | SYSTOLIC BLOOD PRESSURE: 118 MMHG | RESPIRATION RATE: 18 BRPM

## 2021-07-09 DIAGNOSIS — E78.5 HYPERLIPIDEMIA LDL GOAL <70: Primary | ICD-10-CM

## 2021-07-09 PROCEDURE — 99214 OFFICE O/P EST MOD 30 MIN: CPT | Performed by: INTERNAL MEDICINE

## 2021-07-09 RX ORDER — TRIAMCINOLONE ACETONIDE 1 MG/G
CREAM TOPICAL 3 TIMES DAILY
Qty: 28.4 G | Refills: 0 | Status: SHIPPED | OUTPATIENT
Start: 2021-07-09

## 2021-07-09 NOTE — PROGRESS NOTES
Subjective   Candy Garcia is a 66 y.o. female. Patient is here today for   Chief Complaint   Patient presents with   • Hyperlipidemia     followup on labs           Vitals:    07/09/21 1121   BP: 118/76   Pulse: 78   Resp: 18   Temp: 98 °F (36.7 °C)   SpO2: 97%     Body mass index is 33.66 kg/m².      Past Medical History:   Diagnosis Date   • Acute pansinusitis    • Allergic    • Arthritis 8/7/2018   • Asthma    • Depression    • Disease of thyroid gland    • GERD (gastroesophageal reflux disease)    • Hip pain, acute, left    • Hypercholesterolemia    • Hyperlipidemia    • Hypertension    • Hypothyroidism    • Irritable bowel syndrome 1/19/2016   • Raynaud's phenomenon    • Right-sided lacunar infarction (CMS/HCC) 12/1/2016   • Stroke (CMS/[facility])    • Tremor 1/19/2016      Allergies   Allergen Reactions   • Penicillins Anaphylaxis     Occurred when pt was 16 years old   • Atorvastatin Myalgia   • Rosuvastatin Myalgia      Social History     Socioeconomic History   • Marital status:      Spouse name: Not on file   • Number of children: Not on file   • Years of education: Not on file   • Highest education level: Not on file   Tobacco Use   • Smoking status: Never Smoker   • Smokeless tobacco: Never Used   Substance and Sexual Activity   • Alcohol use: Yes     Comment: daily caffiene   • Drug use: Never   • Sexual activity: Not Currently     Birth control/protection: Post-menopausal        Current Outpatient Medications:   •  albuterol (PROVENTIL HFA;VENTOLIN HFA) 108 (90 BASE) MCG/ACT inhaler, Inhale 1-2 puffs every 4 (four) hours as needed for wheezing., Disp: , Rfl:   •  aspirin  MG tablet, TAKE ONE TABLET BY MOUTH DAILY, Disp: 90 tablet, Rfl: 1  •  cetirizine (zyrTEC) 5 MG tablet, Take 5 mg by mouth Daily., Disp: , Rfl:   •  dicyclomine (BENTYL) 10 MG capsule, TAKE 1 CAPSULE BY MOUTH FOUR TIMES A DAY BEFORE MEALS AND AT BEDTIME, Disp: 100 capsule, Rfl: 2  •  DULoxetine (CYMBALTA) 60 MG capsule, TAKE 1  CAPSULE BY MOUTH EVERY DAY, Disp: 90 capsule, Rfl: 1  •  Evolocumab (Repatha) solution prefilled syringe injection, Inject 1 mL under the skin into the appropriate area as directed Every 14 (Fourteen) Days., Disp: 2 mL, Rfl: 11  •  levothyroxine (Synthroid) 100 MCG tablet, Take 1 tablet by mouth Daily., Disp: 90 tablet, Rfl: 3  •  lisinopril-hydrochlorothiazide (PRINZIDE,ZESTORETIC) 20-12.5 MG per tablet, TAKE 1 TABLET BY MOUTH EVERY DAY, Disp: 90 tablet, Rfl: 3  •  meloxicam (MOBIC) 7.5 MG tablet, TAKE 1 TABLET BY MOUTH EVERY DAY, Disp: 30 tablet, Rfl: 5  •  Multiple Vitamins-Minerals (CENTRUM SILVER 50+WOMEN PO), Take  by mouth Daily., Disp: , Rfl:   •  omeprazole (PriLOSEC) 20 MG capsule, Take 2 tablets by mouth every morning., Disp: , Rfl:   •  triamcinolone (KENALOG) 0.1 % cream, Apply  topically to the appropriate area as directed 3 (Three) Times a Day., Disp: 28.4 g, Rfl: 0     Objective     I sent out a prescription for this patient for Repatha the last time we met.  She is taken 2 injections of Repatha in the meantime and she is here to follow-up on her lipid profile.    She has a history of a stroke and her target LDL of cholesterol consequently is less than 70.    Her insurance company sent her a note saying that they will require prior authorization.    Hyperlipidemia         Review of Systems   Constitutional: Negative.    HENT: Negative.    Respiratory: Negative.    Cardiovascular: Negative.    Musculoskeletal: Negative.    Psychiatric/Behavioral: Negative.        Physical Exam  Vitals and nursing note reviewed.   Constitutional:       Appearance: Normal appearance.      Comments: Pleasant, neatly groomed, in no distress.   Cardiovascular:      Rate and Rhythm: Regular rhythm.      Heart sounds: Normal heart sounds. No murmur heard.   No gallop.    Pulmonary:      Effort: No respiratory distress.      Breath sounds: Normal breath sounds. No wheezing or rales.   Neurological:      Mental Status: She is  alert and oriented to person, place, and time.   Psychiatric:         Mood and Affect: Mood normal.         Behavior: Behavior normal.         Thought Content: Thought content normal.           Problems Addressed this Visit        Cardiac and Vasculature    Hyperlipidemia LDL goal <70 - Primary      Diagnoses       Codes Comments    Hyperlipidemia LDL goal <70    -  Primary ICD-10-CM: E78.5  ICD-9-CM: 272.4             PLAN  She and I reviewed her labs from 3 days ago.  Her LDL cholesterol was 57 at that time.  Her HDL was 67.  This is an excellent result and puts her in a position that she wants to be in.  Medically speaking she really requires an LDL cholesterol of less than 70 which she has achieved.    I will do what I can for her to get prior authorization to receive this medication.    I asked her to follow-up as previously arranged.  No follow-ups on file.

## 2021-07-14 ENCOUNTER — TREATMENT (OUTPATIENT)
Dept: PHYSICAL THERAPY | Facility: CLINIC | Age: 66
End: 2021-07-14

## 2021-07-14 DIAGNOSIS — M25.552 LEFT HIP PAIN: Primary | ICD-10-CM

## 2021-07-14 DIAGNOSIS — R26.2 DIFFICULTY WALKING: ICD-10-CM

## 2021-07-14 PROCEDURE — 97035 APP MDLTY 1+ULTRASOUND EA 15: CPT | Performed by: PHYSICAL THERAPIST

## 2021-07-14 PROCEDURE — 97140 MANUAL THERAPY 1/> REGIONS: CPT | Performed by: PHYSICAL THERAPIST

## 2021-07-14 NOTE — PROGRESS NOTES
90-Day Physical Therapy Re-certification       Patient: Candy Garcia   : 1955  Diagnosis/ICD-10 Code:  Left hip pain [M25.552]  Referring practitioner: Eduardo Greco*  Date of Initial Visit: Type: THERAPY  Noted: 2021  Today's Date: 2021  Patient seen for 15 sessions      Subjective:     Clinical Progress: improved  Home Program Compliance: Yes  Treatment has included:  therapeutic exercise, manual therapy, ultrasound and patient education with home exercise program     Subjective I went hiking with my hiking pole.  It was a more challenging trail but I was able to do it with some soreness.  I am not walking more than a mile because I feel it still.    Objective          Palpation   Left   Hypertonic in the iliopsoas, rectus femoris and TFL.   Tenderness of the iliopsoas, rectus femoris and TFL.     Active Range of Motion   Left Hip   Normal active range of motion    Strength/Myotome Testing     Left Hip   Planes of Motion   Flexion: 4+  Abduction: 4+    Left Hip Flexibility Comments:   Mild to mod tightness of quadriceps     Ambulation     Ambulation: Stairs   Ascend stairs: independent  Pattern: reciprocal  Railings: one rail  Descend stairs: independent  Pattern: reciprocal  Railings: one rail    Observational Gait   Gait: within functional limits     Functional Assessment     Single Leg Stance   Left: 20 seconds        See Exercise, Manual, and Modality Logs for complete treatment.     Functional Outcome Score: Walking 1 mile    Assessment & Plan     Assessment  Impairments: activity intolerance, impaired balance, impaired physical strength and pain with function  Assessment details: Candy Garcia is a 65 y.o. year-old female referred to physical therapy for L hip pain due to greater trochanteric bursitis and psoas tendonitis. She has been seen for 15 physical therapy sessions and has progressed very well.  She reports decreased pain in the left hip that has enabled her to walk,  navigate inclines and climb stairs with better ability. She is walking up to 1 mile before needing to stretch and has been on one trail hike with some mild difficulty.  She is walking with normal gait on level ground in therapy.  She still has some mild pain and tenderness to the left anterior and lateral thigh muscles.    Prognosis: good  Functional Limitations: lifting, sleeping, walking, uncomfortable because of pain, sitting and standing  Goals  Plan Goals: STGs to be completed within 30 days:  -Patient will demonstrate compliance and independence with initial HEP  Met  -Patient will increase L Hip ER strength to 4-/5 to help stabilize hip during gait  Met  -Patient will perform sit to stand transfer with equilateral WB and no UE assistance  Met  -Patient will ambulate household distances with <4/10 pain to allow patient to complete ADLs  Met  LTGs to be completed within 90 days:  -Patient will increase L Hip ER strength to 4/5 to help increase ease with putting on shoes/socks  Met   -Patient will complete community mobility 20 min or more with <2/10 pain to allow patient to walk for leisure  Met  -Patient will improve score on LEFS from 30 at eval to 50 or greater to improve quality of life  Ongoing   -Patient will progress her walking for leisure past one mile and on trails    Plan  Therapy options: will be seen for skilled physical therapy services  Planned modality interventions: ultrasound  Planned therapy interventions: stretching, strengthening, therapeutic activities, manual therapy, balance/weight-bearing training, home exercise program and neuromuscular re-education  Frequency: 2x month (36 visits)  Treatment plan discussed with: patient           Recommendations: Continue as planned  Timeframe: 3 months  Prognosis to achieve goals: good    PT Signature: Vickie Hooper, PT      Based upon review of the patient's progress and continued therapy plan, it is my medical opinion that Candy Garcia should  continue physical therapy treatment at [facility]  [address] [place] [address] [phone].  Fax to [phone]    Signature: __________________________________  Eduardo Rodrigues MD    Timed:  Manual Therapy:    30     mins  12622;  Therapeutic Exercise:    0     mins  39956;     Neuromuscular Salvador:    0    mins  66569;    Therapeutic Activity:     0     mins  91844;     Gait Trainin     mins  55608;     Ultrasound:     10     mins  88054;    Electrical Stimulation:    0     mins  97432 ( );  Iontophoresis    0     mins 36666;  Dry Needling              0     mins    Untimed:  Electrical Stimulation:    0     mins  12360 ( );  Mechanical Traction:    0     mins  26326;     Timed Treatment:   40   mins   Total Treatment:     40   mins

## 2021-08-05 ENCOUNTER — TREATMENT (OUTPATIENT)
Dept: PHYSICAL THERAPY | Facility: CLINIC | Age: 66
End: 2021-08-05

## 2021-08-05 DIAGNOSIS — R26.2 DIFFICULTY WALKING: ICD-10-CM

## 2021-08-05 DIAGNOSIS — M25.552 LEFT HIP PAIN: Primary | ICD-10-CM

## 2021-08-05 PROCEDURE — 97140 MANUAL THERAPY 1/> REGIONS: CPT | Performed by: PHYSICAL THERAPIST

## 2021-08-05 PROCEDURE — 97035 APP MDLTY 1+ULTRASOUND EA 15: CPT | Performed by: PHYSICAL THERAPIST

## 2021-08-05 NOTE — PROGRESS NOTES
Physical Therapy Daily Progress Note    Patient: Candy Garcia   : 1955  Diagnosis/ICD-10 Code:  Left hip pain [M25.552]  Referring practitioner: Eduardo Greco*  Date of Initial Visit: Type: THERAPY  Noted: 2021  Today's Date: 2021  Patient seen for 16 sessions           Subjective I was able to hike 2 miles yesterday using hiking poles but did have to stop at 2 miles with soreness in hip.  I have been doing stairs better but little more difficulty today.    Objective   See Exercise, Manual, and Modality Logs for complete treatment.       Assessment/Plan    Candy Chung is regularly walking 1.5 miles then able to negotiate stairs easier.  Today she does have moderate tautness in the left ITB/vastus lateralis since last appointment was 3 weeks ago.       Timed:    Manual Therapy:    30     mins  29485;  Therapeutic Exercise:    0     mins  71672;     Neuromuscular Salvador:    0    mins  00798;    Therapeutic Activity:     0     mins  87781;     Gait Trainin     mins  08320;     Ultrasound:     10     mins  02086;    Electrical Stimulation:    0     mins  22479 ( );  Iontophoresis    0     mins 12995;  Aquatic Therapy    0     mins 09558;  Dry Needling              0     mins    Untimed:  Electrical Stimulation:    0     mins  20469 ( );  Mechanical Traction:    0     mins  68231;     Timed Treatment:   40   mins   Total Treatment:     40   mins  Vickie Hooper, PT  Physical Therapist

## 2021-08-25 ENCOUNTER — TREATMENT (OUTPATIENT)
Dept: PHYSICAL THERAPY | Facility: CLINIC | Age: 66
End: 2021-08-25

## 2021-08-25 DIAGNOSIS — R26.2 DIFFICULTY WALKING: ICD-10-CM

## 2021-08-25 DIAGNOSIS — M25.552 LEFT HIP PAIN: Primary | ICD-10-CM

## 2021-08-25 PROCEDURE — 97140 MANUAL THERAPY 1/> REGIONS: CPT | Performed by: PHYSICAL THERAPIST

## 2021-08-25 PROCEDURE — 97035 APP MDLTY 1+ULTRASOUND EA 15: CPT | Performed by: PHYSICAL THERAPIST

## 2021-08-25 NOTE — PROGRESS NOTES
30-Day / 10-Visit Progress Note         Patient: Candy Garcia   : 1955  Diagnosis/ICD-10 Code:  No primary diagnosis found.  Referring practitioner: Eduardo Greco*  Date of Initial Visit: No linked episodes  Today's Date: 2021  Patient seen for Visit count could not be calculated. Make sure you are using a visit which is associated with an episode. sessions      Subjective:     Clinical Progress: improved  Home Program Compliance: Yes  Treatment has included:  therapeutic exercise, manual therapy, ultrasound and patient education with home exercise program     Subjective Evaluation    History of Present Illness    Subjective comment: Increased pain in L hip after walking at car show 2 weeks ago.  I have been using my TENS unit, stretching and massaging. Pain  Current pain rating: 3  At best pain rating: 3  At worst pain ratin  Location: Left hip and thigh         Objective          Palpation   Left   Hypertonic in the rectus femoris and TFL.   Tenderness of the rectus femoris and TFL.     Additional Palpation Details  Tight and tender ITB        See Exercise, Manual, and Modality Logs for complete treatment.     Assessment & Plan     Assessment  Impairments: activity intolerance, impaired balance, impaired physical strength and pain with function  Assessment details: Candy Garcia is a 65 y.o. year-old female referred to physical therapy for L hip pain due to greater trochanteric bursitis and psoas tendonitis. She has been seen for 17 physical therapy sessions and was progressing very well until 2 weeks ago.  She experienced increased pain rated up to 5/10 after walking at car show that was not necessarily further than her regular walking of 1.5-2 miles.  She has used self-treatment tools to reduce her pain level some but did present with increased tenderness and tautness today. She responded well again to today's treatment and is scheduled to return in 2 weeks.  Prognosis: good  Functional  Limitations: lifting, sleeping, walking, uncomfortable because of pain, sitting and standing  Goals  Plan Goals: STGs to be completed within 30 days:  -Patient will demonstrate compliance and independence with initial HEP  Met  -Patient will increase L Hip ER strength to 4-/5 to help stabilize hip during gait  Met  -Patient will perform sit to stand transfer with equilateral WB and no UE assistance  Met  -Patient will ambulate household distances with <4/10 pain to allow patient to complete ADLs  Met  LTGs to be completed within 90 days:  -Patient will increase L Hip ER strength to 4/5 to help increase ease with putting on shoes/socks  Met   -Patient will complete community mobility 20 min or more with <2/10 pain to allow patient to walk for leisure  Met  -Patient will improve score on LEFS from 30 at eval to 50 or greater to improve quality of life  Ongoing   -Patient will progress her walking for leisure past one mile and on trails  Partially met     Plan  Therapy options: will be seen for skilled physical therapy services  Planned modality interventions: ultrasound  Planned therapy interventions: stretching, strengthening, therapeutic activities, manual therapy, balance/weight-bearing training, home exercise program and neuromuscular re-education  Frequency: 2x month (36 visits)  Treatment plan discussed with: patient           Recommendations: Continue as planned  Timeframe: 2 months  Prognosis to achieve goals: good    PT Signature: Vickie Hooper, PT      Based upon review of the patient's progress and continued therapy plan, it is my medical opinion that Candy Garcia should continue physical therapy treatment at [facility] TH[facility]APY  [address] [place] [address]  [phone].    Signature: __________________________________  Eduardo Rodrigues MD    Timed:  Manual Therapy:    30     mins  82676;  Therapeutic Exercise:    0     mins  19127;      Neuromuscular Salvador:    0    mins  44463;    Therapeutic Activity:     0     mins  51489;     Gait Trainin     mins  38509;     Ultrasound:     10     mins  13060;    Electrical Stimulation:    0     mins  77440 ( );  Iontophoresis    0     mins 88691;  Dry Needling              0     mins    Untimed:  Electrical Stimulation:    0     mins  90863 ( );  Mechanical Traction:    0     mins  15374;     Timed Treatment:   40   mins   Total Treatment:     40   mins

## 2021-08-27 NOTE — TELEPHONE ENCOUNTER
Caller: Candy Garcia    Relationship: Self    Best call back number: [phone]    Medication needed:   Requested Prescriptions     Pending Prescriptions Disp Refills   • meloxicam (MOBIC) 7.5 MG tablet 30 tablet 5     Sig: Take 1 tablet by mouth Daily.       When do you need the refill by: ASAP     What additional details did the patient provide when requesting the medication: PATIENT IS REQUESTING 90 DAY SUPPLY     Does the patient have less than a 3 day supply:  [x] Yes  [] No    What is the patient's preferred pharmacy: [facility] [address] AT [facility] & ([facility] [phone] [facility] [phone] FX

## 2021-08-31 RX ORDER — MELOXICAM 7.5 MG/1
7.5 TABLET ORAL DAILY
Qty: 30 TABLET | Refills: 5 | Status: SHIPPED | OUTPATIENT
Start: 2021-08-31 | End: 2023-03-13

## 2021-09-08 ENCOUNTER — TREATMENT (OUTPATIENT)
Dept: PHYSICAL THERAPY | Facility: CLINIC | Age: 66
End: 2021-09-08

## 2021-09-08 DIAGNOSIS — M25.552 LEFT HIP PAIN: Primary | ICD-10-CM

## 2021-09-08 DIAGNOSIS — R26.2 DIFFICULTY WALKING: ICD-10-CM

## 2021-09-08 PROCEDURE — 97140 MANUAL THERAPY 1/> REGIONS: CPT | Performed by: PHYSICAL THERAPIST

## 2021-09-08 PROCEDURE — 97035 APP MDLTY 1+ULTRASOUND EA 15: CPT | Performed by: PHYSICAL THERAPIST

## 2021-09-08 NOTE — PROGRESS NOTES
Physical Therapy Daily Progress Note    Patient: Candy Garcia   : 1955  Diagnosis/ICD-10 Code:  Left hip pain [M25.552]  Referring practitioner: Eduardo Greco*  Date of Initial Visit: Type: THERAPY  Noted: 2021  Today's Date: 2021  Patient seen for 18 sessions           Subjective My joints were sore after first Covid vaccine last week but not as bad now.  I was able to extend my walk to 1.5 miles and did not have to stretch during walk.     Objective   See Exercise, Manual, and Modality Logs for complete treatment.       Assessment/Plan    Stair mobility at best today rated 2/10.  Walking increased to 1.5 miles without increased pain. Reduced tenderness and tightness to lateral hip and IT to mild to moderate in small areas.   Patient on vacation for next month and plan to follow up to assess her tolerance to increased activities.        Timed:    Manual Therapy:    30     mins  40246;  Therapeutic Exercise:    2     mins  09773;     Neuromuscular Salvador:    0    mins  26167;    Therapeutic Activity:     0     mins  12063;     Gait Trainin     mins  69937;     Ultrasound:     10     mins  22826;    Electrical Stimulation:    0     mins  37106 ( );  Iontophoresis    0     mins 59935;  Aquatic Therapy    0     mins 04930;  Dry Needling              0     mins    Untimed:  Electrical Stimulation:    0     mins  20952 ( );  Mechanical Traction:    0     mins  26885;     Timed Treatment:   42   mins   Total Treatment:     42   mins  Vickie Hooper, PT  Physical Therapist

## 2021-10-14 ENCOUNTER — TREATMENT (OUTPATIENT)
Dept: PHYSICAL THERAPY | Facility: CLINIC | Age: 66
End: 2021-10-14

## 2021-10-14 DIAGNOSIS — R26.2 DIFFICULTY WALKING: ICD-10-CM

## 2021-10-14 DIAGNOSIS — M25.552 LEFT HIP PAIN: Primary | ICD-10-CM

## 2021-10-14 PROCEDURE — 97140 MANUAL THERAPY 1/> REGIONS: CPT | Performed by: PHYSICAL THERAPIST

## 2021-10-14 PROCEDURE — 97035 APP MDLTY 1+ULTRASOUND EA 15: CPT | Performed by: PHYSICAL THERAPIST

## 2021-11-17 RX ORDER — DULOXETIN HYDROCHLORIDE 60 MG/1
60 CAPSULE, DELAYED RELEASE ORAL DAILY
Qty: 90 CAPSULE | Refills: 1 | Status: SHIPPED | OUTPATIENT
Start: 2021-11-17 | End: 2022-03-07

## 2021-11-23 NOTE — TELEPHONE ENCOUNTER
Caller: Candy Garcia    Relationship: Self    Best call back number: [phone]    Requested Prescriptions:   Requested Prescriptions     Pending Prescriptions Disp Refills   • levothyroxine (Synthroid) 100 MCG tablet 90 tablet 3     Sig: Take 1 tablet by mouth Daily.        Pharmacy where request should be sent: [id number] [place] [address] [address] [facility] AT [facility] OF  [address]  - [phone]  - [phone] FX     Additional details provided by patient: PATIENT IS OUT. SHE IS OUT OF TOWN HELPING HER DAUGHTER BEFORE THE BABY COMES AND DOES NOT KNOW WHEN SHE WILL BACK. SHE ASKED IF HER MEDICATION CAN BE SENT TO [facility].  Does the patient have less than a 3 day supply:  [x] Yes  [] No    Angel Ordonez Rep   11/23/21 12:11 EST

## 2021-11-23 NOTE — TELEPHONE ENCOUNTER
Rx Refill Note  Requested Prescriptions     Pending Prescriptions Disp Refills   • levothyroxine (Synthroid) 100 MCG tablet 90 tablet 3     Sig: Take 1 tablet by mouth Daily.      Last office visit with prescribing clinician: 7/9/2021      Next office visit with prescribing clinician: 1/21/2022            Isabel Villafana MA  11/23/21, 12:35 EST

## 2021-11-24 RX ORDER — LEVOTHYROXINE SODIUM 0.1 MG/1
100 TABLET ORAL DAILY
Qty: 90 TABLET | Refills: 3 | Status: SHIPPED | OUTPATIENT
Start: 2021-11-24 | End: 2022-08-22

## 2021-11-24 NOTE — TELEPHONE ENCOUNTER
PATIENT CALLED YESTERDAY AND HAS NOT HEARD BACK. PATIENT IS IN [place], OUT OF MEDICATION.    Caller: Candy Garcia ANA    Relationship: Self    Best call back number: [phone]    Requested Prescriptions:   Requested Prescriptions     Pending Prescriptions Disp Refills   • levothyroxine (Synthroid) 100 MCG tablet 90 tablet 3     Sig: Take 1 tablet by mouth Daily.        Pharmacy where request should be sent: [facility] #[id number] [place] [address] [address] AT [facility] [address] [facility] [phone]  - [phone] FX     Additional details provided by patient: PATIENT IS OUT OF MEDICATION, IN [place] FOR THE HOLIDAY    Does the patient have less than a 3 day supply:  [x] Yes  [] No

## 2021-12-29 ENCOUNTER — HOSPITAL ENCOUNTER (OUTPATIENT)
Dept: GENERAL RADIOLOGY | Facility: HOSPITAL | Age: 66
Discharge: HOME | End: 2021-12-29
Admitting: NURSE PRACTITIONER

## 2021-12-29 ENCOUNTER — OFFICE VISIT (OUTPATIENT)
Dept: FAMILY MEDICINE CLINIC | Facility: CLINIC | Age: 66
End: 2021-12-29

## 2021-12-29 VITALS
HEART RATE: 72 BPM | RESPIRATION RATE: 18 BRPM | BODY MASS INDEX: 31.96 KG/M2 | HEIGHT: 64 IN | WEIGHT: 187.2 LBS | DIASTOLIC BLOOD PRESSURE: 80 MMHG | SYSTOLIC BLOOD PRESSURE: 110 MMHG | OXYGEN SATURATION: 100 % | TEMPERATURE: 97.7 F

## 2021-12-29 DIAGNOSIS — M53.3 SI (SACROILIAC) JOINT DYSFUNCTION: ICD-10-CM

## 2021-12-29 DIAGNOSIS — M54.50 ACUTE BILATERAL LOW BACK PAIN, UNSPECIFIED WHETHER SCIATICA PRESENT: ICD-10-CM

## 2021-12-29 DIAGNOSIS — M54.50 ACUTE BILATERAL LOW BACK PAIN, UNSPECIFIED WHETHER SCIATICA PRESENT: Primary | ICD-10-CM

## 2021-12-29 DIAGNOSIS — E66.3 OVER WEIGHT: ICD-10-CM

## 2021-12-29 PROBLEM — Z96.649 FAILED TOTAL HIP ARTHROPLASTY (HCC): Status: ACTIVE | Noted: 2018-08-07

## 2021-12-29 PROBLEM — T84.018A FAILED TOTAL HIP ARTHROPLASTY (HCC): Status: ACTIVE | Noted: 2018-08-07

## 2021-12-29 PROBLEM — Z96.649 STATUS POST REVISION OF TOTAL HIP REPLACEMENT: Status: ACTIVE | Noted: 2018-08-10

## 2021-12-29 PROCEDURE — 72100 X-RAY EXAM L-S SPINE 2/3 VWS: CPT

## 2021-12-29 PROCEDURE — 99213 OFFICE O/P EST LOW 20 MIN: CPT | Performed by: NURSE PRACTITIONER

## 2021-12-29 RX ORDER — BACLOFEN 5 MG/1
5 TABLET ORAL 2 TIMES DAILY PRN
Qty: 28 TABLET | Refills: 0 | Status: SHIPPED | OUTPATIENT
Start: 2021-12-29 | End: 2022-01-17 | Stop reason: SDUPTHER

## 2021-12-29 RX ORDER — PREDNISONE 20 MG/1
20 TABLET ORAL 2 TIMES DAILY
Qty: 8 TABLET | Refills: 0 | Status: SHIPPED | OUTPATIENT
Start: 2021-12-29 | End: 2021-12-31 | Stop reason: SDUPTHER

## 2021-12-30 ENCOUNTER — TELEPHONE (OUTPATIENT)
Dept: FAMILY MEDICINE CLINIC | Facility: CLINIC | Age: 66
End: 2021-12-30

## 2021-12-30 NOTE — TELEPHONE ENCOUNTER
THE PATIENT WOULD LIKE FOR THE FOLLOWING MEDICATIONS TO BE SENT TO [facility] #[id number] - [place], [place] - [address] AT [facility] OF  [address]  - [phone]  - [phone] FX :     predniSONE (DELTASONE) 20 MG tablet    baclofen 5 MG tablet

## 2021-12-31 RX ORDER — PREDNISONE 20 MG/1
20 TABLET ORAL 2 TIMES DAILY
Qty: 8 TABLET | Refills: 0 | Status: SHIPPED | OUTPATIENT
Start: 2021-12-31 | End: 2022-01-04

## 2022-01-02 NOTE — PATIENT INSTRUCTIONS
May use cold compress/ice pack 10-15 minutes at a time several times a day   May use warm compress/heating pad 10-15 minutes at at time several times a day  May use over the counter biofreeze as needed (wash off from skin before using heating pad)

## 2022-01-03 DIAGNOSIS — E78.5 HYPERLIPIDEMIA LDL GOAL <70: Primary | ICD-10-CM

## 2022-01-03 DIAGNOSIS — E03.9 HYPOTHYROIDISM, UNSPECIFIED TYPE: ICD-10-CM

## 2022-01-04 LAB
25(OH)D3+25(OH)D2 SERPL-MCNC: 44.4 NG/ML (ref 30–100)
ALBUMIN SERPL-MCNC: 4.3 G/DL (ref 3.8–4.8)
ALBUMIN/GLOB SERPL: 2 {RATIO} (ref 1.2–2.2)
ALP SERPL-CCNC: 187 IU/L (ref 44–121)
ALT SERPL-CCNC: 99 IU/L (ref 0–32)
AST SERPL-CCNC: 45 IU/L (ref 0–40)
BASOPHILS # BLD AUTO: 0 X10E3/UL (ref 0–0.2)
BASOPHILS NFR BLD AUTO: 0 %
BILIRUB SERPL-MCNC: 0.4 MG/DL (ref 0–1.2)
BUN SERPL-MCNC: 19 MG/DL (ref 8–27)
BUN/CREAT SERPL: 28 (ref 12–28)
CALCIUM SERPL-MCNC: 10.2 MG/DL (ref 8.7–10.3)
CHLORIDE SERPL-SCNC: 100 MMOL/L (ref 96–106)
CHOLEST SERPL-MCNC: 151 MG/DL (ref 100–199)
CO2 SERPL-SCNC: 27 MMOL/L (ref 20–29)
CREAT SERPL-MCNC: 0.69 MG/DL (ref 0.57–1)
EOSINOPHIL # BLD AUTO: 0 X10E3/UL (ref 0–0.4)
EOSINOPHIL NFR BLD AUTO: 0 %
ERYTHROCYTE [DISTWIDTH] IN BLOOD BY AUTOMATED COUNT: 12.8 % (ref 11.7–15.4)
GLOBULIN SER CALC-MCNC: 2.1 G/DL (ref 1.5–4.5)
GLUCOSE SERPL-MCNC: 84 MG/DL (ref 65–99)
HCT VFR BLD AUTO: 44.3 % (ref 34–46.6)
HDLC SERPL-MCNC: 81 MG/DL
HGB BLD-MCNC: 14.5 G/DL (ref 11.1–15.9)
IMM GRANULOCYTES # BLD AUTO: 0 X10E3/UL (ref 0–0.1)
IMM GRANULOCYTES NFR BLD AUTO: 0 %
LDLC SERPL CALC-MCNC: 51 MG/DL (ref 0–99)
LDLC/HDLC SERPL: 0.6 RATIO (ref 0–3.2)
LYMPHOCYTES # BLD AUTO: 1.5 X10E3/UL (ref 0.7–3.1)
LYMPHOCYTES NFR BLD AUTO: 16 %
MCH RBC QN AUTO: 29.4 PG (ref 26.6–33)
MCHC RBC AUTO-ENTMCNC: 32.7 G/DL (ref 31.5–35.7)
MCV RBC AUTO: 90 FL (ref 79–97)
MONOCYTES # BLD AUTO: 0.4 X10E3/UL (ref 0.1–0.9)
MONOCYTES NFR BLD AUTO: 4 %
NEUTROPHILS # BLD AUTO: 7.3 X10E3/UL (ref 1.4–7)
NEUTROPHILS NFR BLD AUTO: 80 %
PLATELET # BLD AUTO: 249 X10E3/UL (ref 150–450)
POTASSIUM SERPL-SCNC: 6 MMOL/L (ref 3.5–5.2)
PROT SERPL-MCNC: 6.4 G/DL (ref 6–8.5)
RBC # BLD AUTO: 4.93 X10E6/UL (ref 3.77–5.28)
SODIUM SERPL-SCNC: 139 MMOL/L (ref 134–144)
T4 FREE SERPL-MCNC: 1.43 NG/DL (ref 0.82–1.77)
TRIGL SERPL-MCNC: 111 MG/DL (ref 0–149)
TSH SERPL DL<=0.005 MIU/L-ACNC: 0.78 UIU/ML (ref 0.45–4.5)
UNABLE TO VOID: NORMAL
VLDLC SERPL CALC-MCNC: 19 MG/DL (ref 5–40)
WBC # BLD AUTO: 9.2 X10E3/UL (ref 3.4–10.8)

## 2022-01-06 ENCOUNTER — OFFICE VISIT (OUTPATIENT)
Dept: FAMILY MEDICINE CLINIC | Facility: CLINIC | Age: 67
End: 2022-01-06

## 2022-01-06 VITALS
HEIGHT: 64 IN | DIASTOLIC BLOOD PRESSURE: 76 MMHG | TEMPERATURE: 97.2 F | WEIGHT: 187.6 LBS | BODY MASS INDEX: 32.03 KG/M2 | RESPIRATION RATE: 18 BRPM | SYSTOLIC BLOOD PRESSURE: 120 MMHG | HEART RATE: 94 BPM | OXYGEN SATURATION: 100 %

## 2022-01-06 DIAGNOSIS — I10 ESSENTIAL HYPERTENSION: Primary | ICD-10-CM

## 2022-01-06 DIAGNOSIS — E03.9 HYPOTHYROIDISM, UNSPECIFIED TYPE: ICD-10-CM

## 2022-01-06 DIAGNOSIS — E78.5 HYPERLIPIDEMIA LDL GOAL <70: ICD-10-CM

## 2022-01-06 DIAGNOSIS — R74.8 ELEVATED ALKALINE PHOSPHATASE LEVEL: ICD-10-CM

## 2022-01-06 PROCEDURE — 99214 OFFICE O/P EST MOD 30 MIN: CPT | Performed by: INTERNAL MEDICINE

## 2022-01-06 NOTE — PROGRESS NOTES
Subjective   Candy Garcia is a 66 y.o. female. Patient is here today for   Chief Complaint   Patient presents with   • Hyperlipidemia     lab follow up, f/u on low back pain    • Hypertension          Vitals:    01/06/22 1027   BP: 120/76   Pulse: 94   Resp: 18   Temp: 97.2 °F (36.2 °C)   SpO2: 100%     Body mass index is 32.19 kg/m².      Past Medical History:   Diagnosis Date   • Acute pansinusitis    • Allergic    • Arthritis 8/7/2018   • Asthma    • Depression    • Disease of thyroid gland    • GERD (gastroesophageal reflux disease)    • Hip pain, acute, left    • Hypercholesterolemia    • Hyperlipidemia    • Hypertension    • Hypothyroidism    • Irritable bowel syndrome 1/19/2016   • Obesity     overweight   • Raynaud's phenomenon    • Right-sided lacunar infarction (HCC) 12/1/2016   • Stroke (HCC)    • Tremor 1/19/2016      Allergies   Allergen Reactions   • Penicillins Anaphylaxis     Occurred when pt was 16 years old   • Atorvastatin Myalgia   • Rosuvastatin Myalgia      Social History     Socioeconomic History   • Marital status:    Tobacco Use   • Smoking status: Never Smoker   • Smokeless tobacco: Never Used   Vaping Use   • Vaping Use: Never used   Substance and Sexual Activity   • Alcohol use: Yes     Alcohol/week: 1.0 standard drink     Types: 1 Glasses of wine per week     Comment: daily caffiene   • Drug use: No   • Sexual activity: Not Currently     Birth control/protection: Post-menopausal        Current Outpatient Medications:   •  albuterol (PROVENTIL HFA;VENTOLIN HFA) 108 (90 BASE) MCG/ACT inhaler, Inhale 1-2 puffs every 4 (four) hours as needed for wheezing., Disp: , Rfl:   •  aspirin  MG tablet, TAKE ONE TABLET BY MOUTH DAILY, Disp: 90 tablet, Rfl: 1  •  baclofen 5 MG tablet, Take 5 mg by mouth 2 (Two) Times a Day As Needed for Muscle Spasms., Disp: 28 tablet, Rfl: 0  •  cetirizine (zyrTEC) 5 MG tablet, Take 5 mg by mouth Daily., Disp: , Rfl:   •  dicyclomine (BENTYL) 10 MG  capsule, TAKE 1 CAPSULE BY MOUTH FOUR TIMES A DAY BEFORE MEALS AND AT BEDTIME, Disp: 100 capsule, Rfl: 2  •  DULoxetine (CYMBALTA) 60 MG capsule, Take 1 capsule by mouth Daily., Disp: 90 capsule, Rfl: 1  •  Evolocumab (Repatha) solution prefilled syringe injection, Inject 1 mL under the skin into the appropriate area as directed Every 14 (Fourteen) Days., Disp: 2 mL, Rfl: 11  •  levothyroxine (Synthroid) 100 MCG tablet, Take 1 tablet by mouth Daily., Disp: 90 tablet, Rfl: 3  •  lisinopril-hydrochlorothiazide (PRINZIDE,ZESTORETIC) 20-12.5 MG per tablet, TAKE 1 TABLET BY MOUTH EVERY DAY, Disp: 90 tablet, Rfl: 3  •  meloxicam (MOBIC) 7.5 MG tablet, Take 1 tablet by mouth Daily., Disp: 30 tablet, Rfl: 5  •  Multiple Vitamins-Minerals (CENTRUM SILVER 50+WOMEN PO), Take  by mouth Daily., Disp: , Rfl:   •  omeprazole (PriLOSEC) 20 MG capsule, Take 2 tablets by mouth every morning., Disp: , Rfl:   •  triamcinolone (KENALOG) 0.1 % cream, Apply  topically to the appropriate area as directed 3 (Three) Times a Day., Disp: 28.4 g, Rfl: 0     Objective     She is here to follow-up on hyperlipidemia and hypertension.    She has no complaints.    She follows up with gynecology routinely.  She gets Pap smears, bone densities and mammograms at her gynecologist office.    Tells me she has had a flu vaccine.    She is not interested in getting a Covid vaccine.       Review of Systems   Constitutional: Negative.    HENT: Negative.    Respiratory: Negative.    Cardiovascular: Negative.    Musculoskeletal: Negative.    Psychiatric/Behavioral: Negative.        Physical Exam  Vitals and nursing note reviewed.   Constitutional:       Appearance: Normal appearance.      Comments: Pleasant, neatly groomed, in no distress.   Neck:      Vascular: No carotid bruit.   Cardiovascular:      Rate and Rhythm: Regular rhythm.      Heart sounds: Normal heart sounds. No murmur heard.  No gallop.    Pulmonary:      Effort: No respiratory distress.       Breath sounds: Normal breath sounds. No wheezing or rales.   Neurological:      Mental Status: She is alert and oriented to person, place, and time.   Psychiatric:         Mood and Affect: Mood normal.         Behavior: Behavior normal.         Thought Content: Thought content normal.           Problems Addressed this Visit        Cardiac and Vasculature    Essential hypertension - Primary    Hyperlipidemia LDL goal <70       Endocrine and Metabolic    Hypothyroidism       Symptoms and Signs    Elevated alkaline phosphatase level      Diagnoses       Codes Comments    Essential hypertension    -  Primary ICD-10-CM: I10  ICD-9-CM: 401.9     Hyperlipidemia LDL goal <70     ICD-10-CM: E78.5  ICD-9-CM: 272.4     Hypothyroidism, unspecified type     ICD-10-CM: E03.9  ICD-9-CM: 244.9     Elevated alkaline phosphatase level     ICD-10-CM: R74.8  ICD-9-CM: 790.5             PLAN  She and I reviewed her labs.  Her hypercholesterolemia is well controlled on Repatha.    His hypertension which is well controlled.    She has hypothyroidism with appropriate replacement.    He has continued elevation of her alkaline phosphatase and now more recently she has had elevation of her AST and ALT.  She is known to have fatty infiltration of her liver and the last time we did a ultrasound of her liver was 3 years ago.  Considering that she has had worsening of the elevation of alkaline phosphatase AST and ALT, I am going to arrange for her to have another right upper quadrant ultrasound.    I asked her to follow-up for an annual wellness visit in about 4 months.  Fasting labs prior to that visit should include: Comprehensive metabolic panel, CBC, lipid profile, urinalysis.  No follow-ups on file.

## 2022-01-17 DIAGNOSIS — M54.50 ACUTE BILATERAL LOW BACK PAIN, UNSPECIFIED WHETHER SCIATICA PRESENT: ICD-10-CM

## 2022-01-17 RX ORDER — BACLOFEN 5 MG/1
5 TABLET ORAL 2 TIMES DAILY PRN
Qty: 28 TABLET | Refills: 0 | Status: SHIPPED | OUTPATIENT
Start: 2022-01-17 | End: 2022-06-24

## 2022-02-28 ENCOUNTER — TELEPHONE (OUTPATIENT)
Dept: FAMILY MEDICINE CLINIC | Facility: CLINIC | Age: 67
End: 2022-02-28

## 2022-02-28 NOTE — TELEPHONE ENCOUNTER
PT HAS CHANGED HER DRUG COVERAGE AND HER NEW COVERAGE REQUIRES A PA FOR HER     REPATHA 140 MG/ML    I HAVE GIVEN YOU THE LETTER THAT PT EMAILED TO ME.    PT SAID ONCE THIS MED HAS BEEN APPROVED TO PLEASE CALL HER AND ASK HER WHAT PHARMACY YOU WILL NEED TO SEND IT TO BECAUSE PT IS CURRENTLY TRAVELING.    PT'S # [phone]

## 2022-03-07 RX ORDER — DULOXETIN HYDROCHLORIDE 60 MG/1
CAPSULE, DELAYED RELEASE ORAL
Qty: 90 CAPSULE | Refills: 1 | Status: SHIPPED | OUTPATIENT
Start: 2022-03-07 | End: 2022-09-02

## 2022-03-07 NOTE — TELEPHONE ENCOUNTER
Rx Refill Note  Requested Prescriptions     Pending Prescriptions Disp Refills   • DULoxetine (CYMBALTA) 60 MG capsule [Pharmacy Med Name: DULOXETINE DR 60MG CAPSULES] 90 capsule 1     Sig: TAKE ONE CAPSULE BY MOUTH EVERY DAY      Last office visit with prescribing clinician: 1/6/2022      Next office visit with prescribing clinician: 5/17/2022            Isabel Villafana MA  03/07/22, 11:03 EST

## 2022-03-23 ENCOUNTER — APPOINTMENT (OUTPATIENT)
Dept: ULTRASOUND IMAGING | Facility: HOSPITAL | Age: 67
End: 2022-03-23

## 2022-04-14 NOTE — TELEPHONE ENCOUNTER
JUST PACO    Spoke with Radha while patient was on the phone still due to me bringing back a patient for another provider. I advised her that if she felt no confusion, lack of ability to speak, no dragging or drooping of face, no inability to walk and that if that was the only symptom she was experiencing, she should be fine. However if she feels more concerned and numbness does not go away, to go to the ER immediately.  Patient is to follow up with PCP tomorrow.     [FreeTextEntry1] : Ms. Sams is a 50 year old female presenting to the office today for a follow up visit for allergies, asthma, chronic cough, GERD, and poor sleep. Her chief complaint is \par \par -she notes rheumatologic issues \par -she notes s/p blood work at [facility] \par -she notes going of Singulair due to sicca\par -she notes intermittent persistent burning sensation in mouth \par -she notes eyes are stuck in the morning and she uses eye drops \par -she notes use of Pepcid BID to control stomach issues but reflux has worsened recently \par -she notes prescribe yeast medication but did not improved condition \par -she denies SOB \par -she denies SOB on back and at night \par -she notes dysphagia \par -she notes last endoscopy was 2/2022\par -she notes sleep is stable \par \par -denies any visual issues, headaches, nausea, vomiting, fever, chills, sweats, chest pain, chest pressure, diarrhea, constipation, dysphagia, dizziness, leg swelling, leg pain, itchy eyes, itchy ears, heartburn, reflux, or sour taste in the mouth.

## 2022-04-20 ENCOUNTER — HOSPITAL ENCOUNTER (OUTPATIENT)
Dept: ULTRASOUND IMAGING | Facility: HOSPITAL | Age: 67
Discharge: HOME | End: 2022-04-20
Admitting: INTERNAL MEDICINE

## 2022-04-20 DIAGNOSIS — R74.8 ELEVATED ALKALINE PHOSPHATASE LEVEL: ICD-10-CM

## 2022-04-20 PROCEDURE — 76705 ECHO EXAM OF ABDOMEN: CPT

## 2022-05-06 ENCOUNTER — OFFICE VISIT (OUTPATIENT)
Dept: FAMILY MEDICINE CLINIC | Facility: CLINIC | Age: 67
End: 2022-05-06

## 2022-05-06 VITALS
DIASTOLIC BLOOD PRESSURE: 74 MMHG | TEMPERATURE: 97.5 F | WEIGHT: 187 LBS | HEART RATE: 85 BPM | BODY MASS INDEX: 31.92 KG/M2 | HEIGHT: 64 IN | RESPIRATION RATE: 18 BRPM | SYSTOLIC BLOOD PRESSURE: 120 MMHG | OXYGEN SATURATION: 98 %

## 2022-05-06 DIAGNOSIS — J20.9 ACUTE BRONCHITIS, UNSPECIFIED ORGANISM: Primary | ICD-10-CM

## 2022-05-06 PROCEDURE — 99213 OFFICE O/P EST LOW 20 MIN: CPT | Performed by: STUDENT IN AN ORGANIZED HEALTH CARE EDUCATION/TRAINING PROGRAM

## 2022-05-06 RX ORDER — AZITHROMYCIN 250 MG/1
TABLET, FILM COATED ORAL
Qty: 6 TABLET | Refills: 0 | Status: SHIPPED | OUTPATIENT
Start: 2022-05-06 | End: 2022-06-24

## 2022-05-06 NOTE — PROGRESS NOTES
"Chief Complaint  Cough (WHEEZING, CONGESTION, NASAL DRIPPING, FEVER SINCE Monday- PT DID DRIVE THROUGH COVID TEST AT [facility] YESTERDAY AND IT WAS NEGATIVE )    Subjective          Candy Garcia presents to [facility]  For runny nose, cough, fever since Monday.  Patient reported she did COVID testing at [facility] yesterday and that was repeat testing and it came out negative.Review of system is negative for fever, headache, chest pain, shortness of breath, palpitation, nausea, vomiting, any recent change in bladder habits.        Cough        Objective   Vital Signs:  /74   Pulse 85   Temp 97.5 °F (36.4 °C) (Oral)   Resp 18   Ht 162.6 cm (64.02\")   Wt 84.8 kg (187 lb)   SpO2 98%   BMI 32.08 kg/m²           Physical Exam  HENT:      Head: Normocephalic and atraumatic.      Mouth/Throat:      Mouth: Mucous membranes are moist.      Pharynx: Oropharynx is clear.   Eyes:      Extraocular Movements: Extraocular movements intact.      Conjunctiva/sclera: Conjunctivae normal.      Pupils: Pupils are equal, round, and reactive to light.   Cardiovascular:      Rate and Rhythm: Normal rate and regular rhythm.   Pulmonary:      Effort: Pulmonary effort is normal.      Breath sounds: Normal breath sounds.   Abdominal:      General: Bowel sounds are normal.      Palpations: Abdomen is soft.   Musculoskeletal:         General: Normal range of motion.      Cervical back: Neck supple.   Skin:     General: Skin is warm.      Capillary Refill: Capillary refill takes less than 2 seconds.   Neurological:      General: No focal deficit present.      Mental Status: She is alert and oriented to person, place, and time. Mental status is at baseline.   Psychiatric:         Mood and Affect: Mood normal.        Result Review :                 Assessment and Plan    Diagnoses and all orders for this visit:    1. Acute bronchitis, unspecified organism (Primary)  Comments:  Prescribe Z-Jhonatan, encourage " to drink a lot of water, RTC or ER if symptoms worsen, for cough patient can use over-the-counter Robitussin cough syrup  Orders:  -     azithromycin (Zithromax Z-Jhonatan) 250 MG tablet; Take 2 tablets by mouth on day 1, then 1 tablet daily on days 2-5  Dispense: 6 tablet; Refill: 0      Warning symptoms like shortness of breath, using neck muscles for breathing, unable to speak in full sentences should not be ignored and patient should go immediately to ER for further evaluation.         Follow Up   Return if symptoms worsen or fail to improve.  Patient was given instructions and counseling regarding her condition or for health maintenance advice. Please see specific information pulled into the AVS if appropriate.

## 2022-05-10 RX ORDER — LISINOPRIL AND HYDROCHLOROTHIAZIDE 20; 12.5 MG/1; MG/1
TABLET ORAL
Qty: 90 TABLET | Refills: 3 | Status: SHIPPED | OUTPATIENT
Start: 2022-05-10 | End: 2023-02-06

## 2022-05-10 NOTE — TELEPHONE ENCOUNTER
Rx Refill Note  Requested Prescriptions     Pending Prescriptions Disp Refills   • lisinopril-hydrochlorothiazide (PRINZIDE,ZESTORETIC) 20-12.5 MG per tablet [Pharmacy Med Name: LISINOPRIL-HCTZ 20/12.5MG TABLETS] 90 tablet 3     Sig: TAKE 1 TABLET BY MOUTH EVERY DAY      Last office visit with prescribing clinician: 1/6/2022      Next office visit with prescribing clinician: 6/24/2022            Isabel Villafana MA  05/10/22, 10:35 EDT

## 2022-05-18 RX ORDER — EVOLOCUMAB 140 MG/ML
INJECTION, SOLUTION SUBCUTANEOUS
Qty: 2 ML | Refills: 1 | Status: SHIPPED | OUTPATIENT
Start: 2022-05-18 | End: 2022-08-01

## 2022-05-18 NOTE — TELEPHONE ENCOUNTER
Rx Refill Note  Requested Prescriptions     Pending Prescriptions Disp Refills   • Repatha SureClick solution auto-injector SureClick injection [Pharmacy Med Name: REPATHA SRCLK 140MG/ML PF AUTO INJ] 2 mL 1     Sig: INJECT 1 ML SUBCUTANEOUS EVERY 14 DAYS AS DIRECTED      Last office visit with prescribing clinician: 1/6/2022      Next office visit with prescribing clinician: 6/24/2022            Isabel Villafana MA  05/18/22, 08:46 EDT

## 2022-06-20 DIAGNOSIS — E78.5 HYPERLIPIDEMIA LDL GOAL <70: Primary | ICD-10-CM

## 2022-06-20 DIAGNOSIS — E03.9 HYPOTHYROIDISM, UNSPECIFIED TYPE: ICD-10-CM

## 2022-06-22 LAB
ALBUMIN SERPL-MCNC: 4.1 G/DL (ref 3.8–4.8)
ALBUMIN/GLOB SERPL: 1.7 {RATIO} (ref 1.2–2.2)
ALP SERPL-CCNC: 226 IU/L (ref 44–121)
ALT SERPL-CCNC: 80 IU/L (ref 0–32)
APPEARANCE UR: CLEAR
AST SERPL-CCNC: 42 IU/L (ref 0–40)
BACTERIA #/AREA URNS HPF: NORMAL /[HPF]
BASOPHILS # BLD AUTO: 0 X10E3/UL (ref 0–0.2)
BASOPHILS NFR BLD AUTO: 0 %
BILIRUB SERPL-MCNC: 0.4 MG/DL (ref 0–1.2)
BILIRUB UR QL STRIP: NEGATIVE
BUN SERPL-MCNC: 15 MG/DL (ref 8–27)
BUN/CREAT SERPL: 21 (ref 12–28)
CALCIUM SERPL-MCNC: 9.9 MG/DL (ref 8.7–10.3)
CASTS URNS QL MICRO: NORMAL /LPF
CHLORIDE SERPL-SCNC: 98 MMOL/L (ref 96–106)
CHOLEST SERPL-MCNC: 143 MG/DL (ref 100–199)
CO2 SERPL-SCNC: 29 MMOL/L (ref 20–29)
COLOR UR: YELLOW
CREAT SERPL-MCNC: 0.72 MG/DL (ref 0.57–1)
EGFRCR SERPLBLD CKD-EPI 2021: 93 ML/MIN/1.73
EOSINOPHIL # BLD AUTO: 0.1 X10E3/UL (ref 0–0.4)
EOSINOPHIL NFR BLD AUTO: 2 %
EPI CELLS #/AREA URNS HPF: NORMAL /HPF (ref 0–10)
ERYTHROCYTE [DISTWIDTH] IN BLOOD BY AUTOMATED COUNT: 13.4 % (ref 11.7–15.4)
GLOBULIN SER CALC-MCNC: 2.4 G/DL (ref 1.5–4.5)
GLUCOSE SERPL-MCNC: 82 MG/DL (ref 65–99)
GLUCOSE UR QL STRIP: NEGATIVE
HCT VFR BLD AUTO: 45.2 % (ref 34–46.6)
HDLC SERPL-MCNC: 66 MG/DL
HGB BLD-MCNC: 14.5 G/DL (ref 11.1–15.9)
HGB UR QL STRIP: NEGATIVE
IMM GRANULOCYTES # BLD AUTO: 0 X10E3/UL (ref 0–0.1)
IMM GRANULOCYTES NFR BLD AUTO: 0 %
KETONES UR QL STRIP: NEGATIVE
LDLC SERPL CALC-MCNC: 52 MG/DL (ref 0–99)
LDLC/HDLC SERPL: 0.8 RATIO (ref 0–3.2)
LEUKOCYTE ESTERASE UR QL STRIP: ABNORMAL
LYMPHOCYTES # BLD AUTO: 1.2 X10E3/UL (ref 0.7–3.1)
LYMPHOCYTES NFR BLD AUTO: 24 %
MCH RBC QN AUTO: 28.7 PG (ref 26.6–33)
MCHC RBC AUTO-ENTMCNC: 32.1 G/DL (ref 31.5–35.7)
MCV RBC AUTO: 89 FL (ref 79–97)
MICRO URNS: ABNORMAL
MONOCYTES # BLD AUTO: 0.3 X10E3/UL (ref 0.1–0.9)
MONOCYTES NFR BLD AUTO: 7 %
NEUTROPHILS # BLD AUTO: 3.3 X10E3/UL (ref 1.4–7)
NEUTROPHILS NFR BLD AUTO: 67 %
NITRITE UR QL STRIP: NEGATIVE
PH UR STRIP: 8 [PH] (ref 5–7.5)
PLATELET # BLD AUTO: 214 X10E3/UL (ref 150–450)
POTASSIUM SERPL-SCNC: 5 MMOL/L (ref 3.5–5.2)
PROT SERPL-MCNC: 6.5 G/DL (ref 6–8.5)
PROT UR QL STRIP: NEGATIVE
RBC # BLD AUTO: 5.06 X10E6/UL (ref 3.77–5.28)
RBC #/AREA URNS HPF: NORMAL /HPF (ref 0–2)
SODIUM SERPL-SCNC: 138 MMOL/L (ref 134–144)
SP GR UR STRIP: 1.02 (ref 1–1.03)
T4 FREE SERPL-MCNC: 1.39 NG/DL (ref 0.82–1.77)
TRIGL SERPL-MCNC: 150 MG/DL (ref 0–149)
TSH SERPL DL<=0.005 MIU/L-ACNC: 1.03 UIU/ML (ref 0.45–4.5)
UROBILINOGEN UR STRIP-MCNC: 0.2 MG/DL (ref 0.2–1)
VLDLC SERPL CALC-MCNC: 25 MG/DL (ref 5–40)
WBC # BLD AUTO: 4.9 X10E3/UL (ref 3.4–10.8)
WBC #/AREA URNS HPF: NORMAL /HPF (ref 0–5)

## 2022-06-24 ENCOUNTER — OFFICE VISIT (OUTPATIENT)
Dept: FAMILY MEDICINE CLINIC | Facility: CLINIC | Age: 67
End: 2022-06-24

## 2022-06-24 VITALS
RESPIRATION RATE: 18 BRPM | TEMPERATURE: 97.8 F | DIASTOLIC BLOOD PRESSURE: 82 MMHG | HEART RATE: 74 BPM | WEIGHT: 184 LBS | OXYGEN SATURATION: 98 % | HEIGHT: 64 IN | BODY MASS INDEX: 31.41 KG/M2 | SYSTOLIC BLOOD PRESSURE: 136 MMHG

## 2022-06-24 DIAGNOSIS — I10 ESSENTIAL HYPERTENSION: ICD-10-CM

## 2022-06-24 DIAGNOSIS — E78.5 HYPERLIPIDEMIA LDL GOAL <70: ICD-10-CM

## 2022-06-24 DIAGNOSIS — Z00.00 MEDICARE ANNUAL WELLNESS VISIT, SUBSEQUENT: Primary | ICD-10-CM

## 2022-06-24 DIAGNOSIS — E03.9 HYPOTHYROIDISM, UNSPECIFIED TYPE: ICD-10-CM

## 2022-06-24 PROCEDURE — G0009 ADMIN PNEUMOCOCCAL VACCINE: HCPCS | Performed by: INTERNAL MEDICINE

## 2022-06-24 PROCEDURE — 1170F FXNL STATUS ASSESSED: CPT | Performed by: INTERNAL MEDICINE

## 2022-06-24 PROCEDURE — 1159F MED LIST DOCD IN RCRD: CPT | Performed by: INTERNAL MEDICINE

## 2022-06-24 PROCEDURE — 90670 PCV13 VACCINE IM: CPT | Performed by: INTERNAL MEDICINE

## 2022-06-24 PROCEDURE — G0439 PPPS, SUBSEQ VISIT: HCPCS | Performed by: INTERNAL MEDICINE

## 2022-06-24 RX ORDER — MELATONIN: COMMUNITY

## 2022-06-24 RX ORDER — FERROUS SULFATE 325(65) MG
325 TABLET ORAL
COMMUNITY

## 2022-06-24 RX ORDER — MULTIVIT WITH MINERALS/LUTEIN
TABLET ORAL
COMMUNITY

## 2022-07-01 PROBLEM — Z00.00 MEDICARE ANNUAL WELLNESS VISIT, SUBSEQUENT: Status: ACTIVE | Noted: 2022-07-01

## 2022-08-01 RX ORDER — EVOLOCUMAB 140 MG/ML
INJECTION, SOLUTION SUBCUTANEOUS
Qty: 2 ML | Refills: 1 | Status: SHIPPED | OUTPATIENT
Start: 2022-08-01 | End: 2022-09-02

## 2022-08-22 RX ORDER — LEVOTHYROXINE SODIUM 0.1 MG/1
100 TABLET ORAL DAILY
Qty: 90 TABLET | Refills: 3 | Status: SHIPPED | OUTPATIENT
Start: 2022-08-22

## 2022-09-02 RX ORDER — EVOLOCUMAB 140 MG/ML
INJECTION, SOLUTION SUBCUTANEOUS
Qty: 2 ML | Refills: 1 | Status: SHIPPED | OUTPATIENT
Start: 2022-09-02 | End: 2022-11-04

## 2022-09-02 RX ORDER — DULOXETIN HYDROCHLORIDE 60 MG/1
CAPSULE, DELAYED RELEASE ORAL
Qty: 90 CAPSULE | Refills: 1 | Status: SHIPPED | OUTPATIENT
Start: 2022-09-02 | End: 2023-02-28

## 2022-10-05 ENCOUNTER — TELEPHONE (OUTPATIENT)
Dept: FAMILY MEDICINE CLINIC | Facility: CLINIC | Age: 67
End: 2022-10-05

## 2022-10-05 NOTE — TELEPHONE ENCOUNTER
Caller: [facility] #[id number] - [place], [place] - [facility] [address] AT [facility] OF  [address] - [phone]  - [phone] FX    Relationship: Pharmacy    Best call back number: [phone]      What was the call regarding: BEE IS CALLING TO SEE IF WE HAVE RECEIVED THE FAX FOR THE PRIOR AUTHORIZATION FOR THE MEDICATION Repatha SureClick    PLEASE CALL AND ADVISE     Do you require a callback: YES

## 2022-11-04 RX ORDER — EVOLOCUMAB 140 MG/ML
INJECTION, SOLUTION SUBCUTANEOUS
Qty: 2 ML | Refills: 1 | Status: SHIPPED | OUTPATIENT
Start: 2022-11-04 | End: 2022-12-30

## 2022-12-08 DIAGNOSIS — E78.5 HYPERLIPIDEMIA LDL GOAL <70: Primary | ICD-10-CM

## 2022-12-08 DIAGNOSIS — E03.9 HYPOTHYROIDISM, UNSPECIFIED TYPE: ICD-10-CM

## 2022-12-16 ENCOUNTER — OFFICE VISIT (OUTPATIENT)
Dept: FAMILY MEDICINE CLINIC | Facility: CLINIC | Age: 67
End: 2022-12-16

## 2022-12-16 VITALS
WEIGHT: 176 LBS | DIASTOLIC BLOOD PRESSURE: 80 MMHG | TEMPERATURE: 98.4 F | HEIGHT: 64 IN | HEART RATE: 79 BPM | BODY MASS INDEX: 30.05 KG/M2 | SYSTOLIC BLOOD PRESSURE: 120 MMHG | OXYGEN SATURATION: 100 %

## 2022-12-16 DIAGNOSIS — E78.5 HYPERLIPIDEMIA LDL GOAL <70: Primary | ICD-10-CM

## 2022-12-16 DIAGNOSIS — E03.9 HYPOTHYROIDISM, UNSPECIFIED TYPE: ICD-10-CM

## 2022-12-16 DIAGNOSIS — I10 ESSENTIAL HYPERTENSION: ICD-10-CM

## 2022-12-16 DIAGNOSIS — I63.20 CEREBROVASCULAR ACCIDENT (CVA) DUE TO STENOSIS OF PRECEREBRAL ARTERY: ICD-10-CM

## 2022-12-16 PROCEDURE — 99214 OFFICE O/P EST MOD 30 MIN: CPT | Performed by: INTERNAL MEDICINE

## 2022-12-16 NOTE — PROGRESS NOTES
Subjective   Candy Garcia is a 67 y.o. female. Patient is here today for   Chief Complaint   Patient presents with   • Hypothyroidism   • Labs Only          Vitals:    12/16/22 0953   BP: 120/80   Pulse: 79   Temp: 98.4 °F (36.9 °C)   SpO2: 100%     Body mass index is 30.2 kg/m².      Past Medical History:   Diagnosis Date   • Acute pansinusitis    • Allergic    • Arthritis 8/7/2018   • Asthma    • Depression    • Disease of thyroid gland    • GERD (gastroesophageal reflux disease)    • Hip pain, acute, left    • Hypercholesterolemia    • Hyperlipidemia    • Hypertension    • Hypothyroidism    • Irritable bowel syndrome 1/19/2016   • Obesity     overweight   • Raynaud's phenomenon    • Right-sided lacunar infarction (HCC) 12/1/2016   • Stroke (HCC)    • Tremor 1/19/2016      Allergies   Allergen Reactions   • Penicillins Anaphylaxis     Occurred when pt was 16 years old   • Atorvastatin Myalgia   • Rosuvastatin Myalgia      Social History     Socioeconomic History   • Marital status:    Tobacco Use   • Smoking status: Never   • Smokeless tobacco: Never   Vaping Use   • Vaping Use: Never used   Substance and Sexual Activity   • Alcohol use: Yes     Alcohol/week: 1.0 standard drink     Types: 1 Glasses of wine per week     Comment: daily caffiene   • Drug use: No   • Sexual activity: Not Currently     Birth control/protection: Post-menopausal        Current Outpatient Medications:   •  ascorbic acid (VITAMIN C) 1000 MG tablet, , Disp: , Rfl:   •  aspirin  MG tablet, TAKE ONE TABLET BY MOUTH DAILY, Disp: 90 tablet, Rfl: 1  •  cetirizine (zyrTEC) 5 MG tablet, Take 5 mg by mouth Daily., Disp: , Rfl:   •  cholecalciferol (VITAMIN D3) 25 MCG (1000 UT) tablet, , Disp: , Rfl:   •  dicyclomine (BENTYL) 10 MG capsule, TAKE 1 CAPSULE BY MOUTH FOUR TIMES A DAY BEFORE MEALS AND AT BEDTIME, Disp: 100 capsule, Rfl: 2  •  DULoxetine (CYMBALTA) 60 MG capsule, TAKE 1 CAPSULE BY MOUTH EVERY DAY, Disp: 90 capsule, Rfl: 1  •   ferrous sulfate 325 (65 FE) MG tablet, Take 325 mg by mouth Daily With Breakfast., Disp: , Rfl:   •  levothyroxine (SYNTHROID, LEVOTHROID) 100 MCG tablet, TAKE 1 TABLET BY MOUTH DAILY, Disp: 90 tablet, Rfl: 3  •  lisinopril-hydrochlorothiazide (PRINZIDE,ZESTORETIC) 20-12.5 MG per tablet, TAKE 1 TABLET BY MOUTH EVERY DAY, Disp: 90 tablet, Rfl: 3  •  meloxicam (MOBIC) 7.5 MG tablet, Take 1 tablet by mouth Daily., Disp: 30 tablet, Rfl: 5  •  Multiple Vitamins-Minerals (CENTRUM SILVER 50+WOMEN PO), Take  by mouth Daily., Disp: , Rfl:   •  omeprazole (PriLOSEC) 20 MG capsule, Take 2 tablets by mouth every morning., Disp: , Rfl:   •  Repatha SureClick solution auto-injector SureClick injection, ADMINISTER 1 ML UNDER THE SKIN EVERY 14 DAYS AS DIRECTED, Disp: 2 mL, Rfl: 1  •  triamcinolone (KENALOG) 0.1 % cream, Apply  topically to the appropriate area as directed 3 (Three) Times a Day., Disp: 28.4 g, Rfl: 0  •  Zinc Sulfate (ZINC 15 PO), Take  by mouth., Disp: , Rfl:      Objective     History of Present Illness  She is here to follow-up on labs from last week.    She tells me that she feels well.  Hypothyroidism         Review of Systems   Constitutional: Negative.    HENT: Negative.    Respiratory: Negative.    Cardiovascular: Negative.    Musculoskeletal: Negative.    Psychiatric/Behavioral: Negative.        Physical Exam  Vitals and nursing note reviewed.   Constitutional:       General: She is not in acute distress.     Appearance: Normal appearance. She is not ill-appearing, toxic-appearing or diaphoretic.      Comments: Pleasant, neatly groomed, no distress.  BMI 30.  Weight 176 pounds.   Neck:      Vascular: No carotid bruit.   Cardiovascular:      Rate and Rhythm: Regular rhythm.      Heart sounds: Normal heart sounds. No murmur heard.    No gallop.   Pulmonary:      Effort: No respiratory distress.      Breath sounds: Normal breath sounds. No wheezing or rales.   Neurological:      Mental Status: She is alert  and oriented to person, place, and time.   Psychiatric:         Mood and Affect: Mood normal.         Behavior: Behavior normal.         Thought Content: Thought content normal.           Problems Addressed this Visit        Cardiac and Vasculature    Essential hypertension    Hyperlipidemia LDL goal <70 - Primary       Endocrine and Metabolic    Hypothyroidism       Neuro    Cerebrovascular accident (CVA) due to stenosis of precerebral artery (HCC)   Diagnoses       Codes Comments    Hyperlipidemia LDL goal <70    -  Primary ICD-10-CM: E78.5  ICD-9-CM: 272.4     Essential hypertension     ICD-10-CM: I10  ICD-9-CM: 401.9     Hypothyroidism, unspecified type     ICD-10-CM: E03.9  ICD-9-CM: 244.9     Cerebrovascular accident (CVA) due to stenosis of precerebral artery (HCC)     ICD-10-CM: I63.20  ICD-9-CM: 433.91             PLAN  She and I reviewed her labs.  In light of her stroke she has had an LDL cholesterol target of less than 50 would be ideal.  Her LDL cholesterol is 38.    Her hypertension is well controlled.    She has hypothyroidism with appropriate replacement.    Her gynecologist arranges bone densities and mammograms for her.    I asked her to follow-up for a Medicare annual wellness visit late in June this year.    Fasting labs prior to her appointment should include: Lipid profile, comprehensive metabolic panel, CBC, urinalysis, TSH, free T4, it would be good to screen her for vitamin D deficiency as well at that time.  No follow-ups on file.

## 2022-12-30 RX ORDER — EVOLOCUMAB 140 MG/ML
INJECTION, SOLUTION SUBCUTANEOUS
Qty: 2 ML | Refills: 2 | Status: SHIPPED | OUTPATIENT
Start: 2022-12-30 | End: 2023-04-04

## 2023-02-06 RX ORDER — LISINOPRIL AND HYDROCHLOROTHIAZIDE 20; 12.5 MG/1; MG/1
TABLET ORAL
Qty: 90 TABLET | Refills: 3 | Status: SHIPPED | OUTPATIENT
Start: 2023-02-06

## 2023-02-28 RX ORDER — DULOXETIN HYDROCHLORIDE 60 MG/1
CAPSULE, DELAYED RELEASE ORAL
Qty: 90 CAPSULE | Refills: 1 | Status: SHIPPED | OUTPATIENT
Start: 2023-02-28

## 2023-03-10 ENCOUNTER — TELEPHONE (OUTPATIENT)
Dept: FAMILY MEDICINE CLINIC | Facility: CLINIC | Age: 68
End: 2023-03-10

## 2023-03-10 NOTE — TELEPHONE ENCOUNTER
"  Caller: Candy Garcia \"Candy Chung\"    Relationship to patient: Self    Best call back number: [phone]    New or established patient?  [] New  [x] Established    Date of discharge:  03/06    Facility discharged from: [facility]    Diagnosis/Symptoms: FELL OUT OF ATTIC HEAD FIRST ON 03/06    Specialty Only: Did you see a [employer]ast[employer] health provider?    [] Yes  [x] No  If so, who?  [facility]      "

## 2023-03-13 ENCOUNTER — OFFICE VISIT (OUTPATIENT)
Dept: FAMILY MEDICINE CLINIC | Facility: CLINIC | Age: 68
End: 2023-03-13
Payer: MEDICARE

## 2023-03-13 VITALS
WEIGHT: 178 LBS | HEIGHT: 64 IN | SYSTOLIC BLOOD PRESSURE: 140 MMHG | BODY MASS INDEX: 30.39 KG/M2 | TEMPERATURE: 98.6 F | RESPIRATION RATE: 16 BRPM | OXYGEN SATURATION: 100 % | DIASTOLIC BLOOD PRESSURE: 80 MMHG | HEART RATE: 83 BPM

## 2023-03-13 DIAGNOSIS — S01.01XD LACERATION OF SCALP WITHOUT FOREIGN BODY, SUBSEQUENT ENCOUNTER: Primary | ICD-10-CM

## 2023-03-13 PROBLEM — S01.01XA LACERATION OF SCALP WITHOUT FOREIGN BODY: Status: ACTIVE | Noted: 2023-03-13

## 2023-03-13 PROCEDURE — 99214 OFFICE O/P EST MOD 30 MIN: CPT | Performed by: INTERNAL MEDICINE

## 2023-03-13 RX ORDER — CYCLOBENZAPRINE HCL 5 MG
5 TABLET ORAL 3 TIMES DAILY PRN
Qty: 15 TABLET | Refills: 0 | Status: SHIPPED | OUTPATIENT
Start: 2023-03-13 | End: 2023-03-18

## 2023-03-13 RX ORDER — CEPHALEXIN 500 MG/1
1 CAPSULE ORAL EVERY 12 HOURS SCHEDULED
COMMUNITY
Start: 2023-03-07

## 2023-03-13 RX ORDER — OXYCODONE HYDROCHLORIDE 5 MG/1
TABLET ORAL
COMMUNITY
Start: 2023-03-07

## 2023-03-13 RX ORDER — CYCLOBENZAPRINE HCL 5 MG
5 TABLET ORAL 3 TIMES DAILY PRN
COMMUNITY
Start: 2023-03-07 | End: 2023-03-13 | Stop reason: SDUPTHER

## 2023-03-13 RX ORDER — ACETAMINOPHEN 325 MG/1
TABLET ORAL
COMMUNITY
Start: 2023-03-07

## 2023-03-13 NOTE — ASSESSMENT & PLAN NOTE
S/P fall from attic. Patient hospitalized at [facility] and reports evaluation unremarkable with exception of lacerations. Patient without complaint at present. Encouraged to keep follow-up appointment with neurosurgery and PCP. Discussed return to clinic parameters and patient voiced an understanding.

## 2023-03-13 NOTE — PROGRESS NOTES
Kamran Aldrich MD  Internal Medicine  [phone] (office)             03/13/2023    Patient Information  Candy Garcia                                                                                          [address] [place] [address]  1955        Chief Complaint   Patient presents with   • Fall     HEAD INJURY 3/6/2023;[facility]          History of Present Illness:    Candy Garcia is a 67 y.o. female who presents to the office today for follow-up after recent hospitalization for fall from attic. Patient reports fall occurred while moving a piece of furniture with her . She landed on her head and developed frontal scalp lacerations. Patient reports evaluation at [facility] was negative for any skull or brain injury. Aside from suturing of lacerations, no further procedures were performed. She has follow-up with neurosurgery soon.       Patient Active Problem List   Diagnosis   • Anemia   • Ankle joint pain   • Anxiety   • Gastroesophageal reflux disease   • Hypothyroidism   • Irritable bowel syndrome   • Seasonal allergic rhinitis   • Tremor   • Healthcare maintenance   • Paresthesia   • MARLEEN positive   • Abnormal CXR   • Cerebrovascular accident (CVA) due to stenosis of precerebral artery (HCC)   • Right-sided lacunar infarction (HCC)   • Essential hypertension   • Raynaud's disease without gangrene   • Hyperlipidemia LDL goal <70   • Preoperative evaluation to rule out surgical contraindication   • Trigger ring finger of right hand   • Failed total hip arthroplasty (HCC)   • Elevated alkaline phosphatase level   • Cellulitis of face   • Status post revision of total hip replacement   • Medicare annual wellness visit, subsequent   • Laceration of scalp without foreign body         Past Medical History:   Diagnosis Date   • Acute pansinusitis    • Allergic    • Arthritis 8/7/2018   • Asthma    • Depression    • Disease of thyroid gland    • GERD (gastroesophageal reflux disease)     • Hip pain, acute, left    • Hypercholesterolemia    • Hyperlipidemia    • Hypertension    • Hypothyroidism    • Irritable bowel syndrome 1/19/2016   • Obesity     overweight   • Raynaud's phenomenon    • Right-sided lacunar infarction (HCC) 12/1/2016   • Stroke (HCC)    • Tremor 1/19/2016         Past Surgical History:   Procedure Laterality Date   • COLONOSCOPY     • DILATATION AND CURETTAGE  2015   • JOINT REPLACEMENT  8/17 & 10/18    Left hip   • THYROID SURGERY     • TONSILLECTOMY     • TOTAL HIP ARTHROPLASTY REVISION  08/01/2017         Allergies   Allergen Reactions   • Penicillins Anaphylaxis     Occurred when pt was 16 years old   • Atorvastatin Myalgia   • Rosuvastatin Myalgia           Current Outpatient Medications:   •  acetaminophen (TYLENOL) 325 MG tablet, TAKE 2 TABLETS BY MOUTH FOUR TIMES DAILY AS NEEDED FOR PAIN, Disp: , Rfl:   •  ascorbic acid (VITAMIN C) 1000 MG tablet, , Disp: , Rfl:   •  aspirin  MG tablet, TAKE ONE TABLET BY MOUTH DAILY, Disp: 90 tablet, Rfl: 1  •  cephalexin (KEFLEX) 500 MG capsule, Take 1 capsule by mouth Every 12 (Twelve) Hours., Disp: , Rfl:   •  cetirizine (zyrTEC) 5 MG tablet, Take 1 tablet by mouth Daily., Disp: , Rfl:   •  cholecalciferol (VITAMIN D3) 25 MCG (1000 UT) tablet, , Disp: , Rfl:   •  cyclobenzaprine (FLEXERIL) 5 MG tablet, Take 1 tablet by mouth 3 (Three) Times a Day As Needed for Muscle Spasms for up to 5 days. for muscle spams, Disp: 15 tablet, Rfl: 0  •  dicyclomine (BENTYL) 10 MG capsule, TAKE 1 CAPSULE BY MOUTH FOUR TIMES A DAY BEFORE MEALS AND AT BEDTIME, Disp: 100 capsule, Rfl: 2  •  DULoxetine (CYMBALTA) 60 MG capsule, TAKE 1 CAPSULE BY MOUTH EVERY DAY, Disp: 90 capsule, Rfl: 1  •  ferrous sulfate 325 (65 FE) MG tablet, Take 1 tablet by mouth Daily With Breakfast., Disp: , Rfl:   •  levothyroxine (SYNTHROID, LEVOTHROID) 100 MCG tablet, TAKE 1 TABLET BY MOUTH DAILY, Disp: 90 tablet, Rfl: 3  •  lisinopril-hydrochlorothiazide  "(PRINZIDE,ZESTORETIC) 20-12.5 MG per tablet, TAKE 1 TABLET BY MOUTH EVERY DAY, Disp: 90 tablet, Rfl: 3  •  Multiple Vitamins-Minerals (CENTRUM SILVER 50+WOMEN PO), Take  by mouth Daily., Disp: , Rfl:   •  omeprazole (PriLOSEC) 20 MG capsule, Take 2 capsules by mouth Every Morning., Disp: , Rfl:   •  oxyCODONE (ROXICODONE) 5 MG immediate release tablet, TAKE ONE-HALF TABLET BY MOUTH EVERY 6 HOURS AS NEEDED FOR SEVERE PAIN, Disp: , Rfl:   •  Repatha SureClick solution auto-injector SureClick injection, ADMINISTER 1 ML UNDER THE SKIN EVERY 14 DAYS AS DIRECTED, Disp: 2 mL, Rfl: 2  •  triamcinolone (KENALOG) 0.1 % cream, Apply  topically to the appropriate area as directed 3 (Three) Times a Day., Disp: 28.4 g, Rfl: 0  •  Zinc Sulfate (ZINC 15 PO), Take  by mouth., Disp: , Rfl:       Family History   Problem Relation Age of Onset   • Crohn's disease Mother    • Lupus Sister         Lupus Exedens   • Fibromyalgia Sister    • Thyroid disease Sister    • Hypertension Father    • Thyroid disease Father    • Hyperlipidemia Father    • Stroke Maternal Grandfather    • Depression Paternal Grandmother    • Thyroid disease Sister    • Thyroid disease Sister    • Thyroid disease Sister          Social History     Socioeconomic History   • Marital status:    Tobacco Use   • Smoking status: Never   • Smokeless tobacco: Never   Vaping Use   • Vaping Use: Never used   Substance and Sexual Activity   • Alcohol use: Yes     Alcohol/week: 1.0 standard drink     Types: 1 Glasses of wine per week     Comment: daily caffiene   • Drug use: No   • Sexual activity: Not Currently     Birth control/protection: Post-menopausal         Vitals:    03/13/23 0932   BP: 140/80   BP Location: Left arm   Patient Position: Sitting   Cuff Size: Adult   Pulse: 83   Resp: 16   Temp: 98.6 °F (37 °C)   TempSrc: Temporal   SpO2: 100%   Weight: 80.7 kg (178 lb)   Height: 162.6 cm (64.02\")        Body mass index is 30.54 kg/m².      Physical Exam  Vitals " reviewed.   Constitutional:       Appearance: Normal appearance.   HENT:      Head: Normocephalic.      Comments: Frontal scalp lacerations with staples in place.   Eyes:      Extraocular Movements: Extraocular movements intact.      Conjunctiva/sclera: Conjunctivae normal.      Comments: Bilateral (R>L) periorbital ecchymoses   Pulmonary:      Effort: Pulmonary effort is normal.   Neurological:      General: No focal deficit present.      Mental Status: She is alert and oriented to person, place, and time.   Psychiatric:         Mood and Affect: Mood normal.         Behavior: Behavior normal.            Lab/other results:      Assessment/Plan:    Diagnoses and all orders for this visit:    1. Laceration of scalp without foreign body, subsequent encounter (Primary)  Assessment & Plan:  S/P fall from attic. Patient hospitalized at [facility] and reports evaluation unremarkable with exception of lacerations. Patient without complaint at present. Encouraged to keep follow-up appointment with neurosurgery and PCP. Discussed return to clinic parameters and patient voiced an understanding.     Orders:  -     cyclobenzaprine (FLEXERIL) 5 MG tablet; Take 1 tablet by mouth 3 (Three) Times a Day As Needed for Muscle Spasms for up to 5 days. for muscle spams  Dispense: 15 tablet; Refill: 0           Procedures

## 2023-04-04 RX ORDER — EVOLOCUMAB 140 MG/ML
INJECTION, SOLUTION SUBCUTANEOUS
Qty: 2 ML | Refills: 2 | Status: SHIPPED | OUTPATIENT
Start: 2023-04-04

## 2023-04-24 ENCOUNTER — TELEPHONE (OUTPATIENT)
Dept: FAMILY MEDICINE CLINIC | Facility: CLINIC | Age: 68
End: 2023-04-24

## 2023-04-26 DIAGNOSIS — I10 ESSENTIAL HYPERTENSION: Primary | ICD-10-CM

## 2023-04-27 LAB
ALBUMIN SERPL-MCNC: 4.4 G/DL (ref 3.5–5.2)
ALBUMIN/GLOB SERPL: 1.9 G/DL
ALP SERPL-CCNC: 195 U/L (ref 39–117)
ALT SERPL-CCNC: 63 U/L (ref 1–33)
AST SERPL-CCNC: 53 U/L (ref 1–32)
BILIRUB SERPL-MCNC: 0.2 MG/DL (ref 0–1.2)
BUN SERPL-MCNC: 16 MG/DL (ref 8–23)
BUN/CREAT SERPL: 20.5 (ref 7–25)
CALCIUM SERPL-MCNC: 10.5 MG/DL (ref 8.6–10.5)
CHLORIDE SERPL-SCNC: 105 MMOL/L (ref 98–107)
CO2 SERPL-SCNC: 29.7 MMOL/L (ref 22–29)
CREAT SERPL-MCNC: 0.78 MG/DL (ref 0.57–1)
EGFRCR SERPLBLD CKD-EPI 2021: 83.9 ML/MIN/1.73
GLOBULIN SER CALC-MCNC: 2.3 GM/DL
GLUCOSE SERPL-MCNC: 63 MG/DL (ref 65–99)
MAGNESIUM SERPL-MCNC: 2 MG/DL (ref 1.6–2.4)
POTASSIUM SERPL-SCNC: 5.5 MMOL/L (ref 3.5–5.2)
PROT SERPL-MCNC: 6.7 G/DL (ref 6–8.5)
SODIUM SERPL-SCNC: 142 MMOL/L (ref 136–145)

## 2023-04-28 ENCOUNTER — TELEPHONE (OUTPATIENT)
Dept: FAMILY MEDICINE CLINIC | Facility: CLINIC | Age: 68
End: 2023-04-28

## 2023-04-28 NOTE — TELEPHONE ENCOUNTER
"Caller: Jose Candy PEREZ \"Candy Chung\"    Relationship: Self    Best call back number: [phone]     Caller requesting test results:     What test was performed: LAB WORK    When was the test performed: 04/26/23    Where was the test performed: IN OFFICE    Additional notes: PATIENT CALLING STATING THAT SHE SEEN THE TEST RESULTS ON MYCHART SHE WOULD LIKE TO KNOW IF  WOULD SUGGEST HER TO TAKE THE MAGNESIUM SUPPLEMENT          "

## 2023-05-01 NOTE — TELEPHONE ENCOUNTER
Called and informed pt that her magnesium level was within normal ranges. Pt stated that she has a concussion from her fall in March. Pt would like to know if Dr. Lagos has any suggestions on what could she take.

## 2023-05-04 NOTE — TELEPHONE ENCOUNTER
Called and informed pt that there isn't much she can do. Pt verbally understood and stated that she sees the neurosurgeon next week.

## 2023-05-15 ENCOUNTER — APPOINTMENT (OUTPATIENT)
Dept: WOMENS IMAGING | Facility: HOSPITAL | Age: 68
End: 2023-05-15
Payer: MEDICARE

## 2023-05-15 PROCEDURE — 77063 BREAST TOMOSYNTHESIS BI: CPT | Performed by: RADIOLOGY

## 2023-05-15 PROCEDURE — 77067 SCR MAMMO BI INCL CAD: CPT | Performed by: RADIOLOGY

## 2023-07-20 PROBLEM — K22.89 ESOPHAGEAL THICKENING: Status: ACTIVE | Noted: 2023-07-20

## 2023-07-24 DIAGNOSIS — K22.89 ESOPHAGEAL THICKENING: Primary | ICD-10-CM

## 2023-07-25 ENCOUNTER — TELEPHONE (OUTPATIENT)
Dept: FAMILY MEDICINE CLINIC | Facility: CLINIC | Age: 68
End: 2023-07-25

## 2023-07-25 NOTE — TELEPHONE ENCOUNTER
PATIENT WOULD LIKE FOR IT TO BE ADDED TO HER MEDICAL CHART THAT SHE RECEIVED THE PFIZER COVID VACCINATION    DOSE 1: 09/01/21  DOSE 2: 12/31/21    CALL BACK IF NEEDED:  [phone]

## 2023-07-27 RX ORDER — TRIAMCINOLONE ACETONIDE 1 MG/G
CREAM TOPICAL 3 TIMES DAILY
Qty: 28.4 G | Refills: 0 | Status: SHIPPED | OUTPATIENT
Start: 2023-07-27

## 2023-07-27 NOTE — TELEPHONE ENCOUNTER
"Caller: Candy Garcia \"Candy Chung\"    Relationship: Self    Best call back number: [phone]*    Requested Prescriptions:   Requested Prescriptions     Pending Prescriptions Disp Refills    triamcinolone (KENALOG) 0.1 % cream 28.4 g 0     Sig: Apply  topically to the appropriate area as directed 3 (Three) Times a Day.        Pharmacy where request should be sent: [id number] [place], [place] - [address] - [phone]  - [phone] FX     Last office visit with prescribing clinician: 7/20/2023   Last telemedicine visit with prescribing clinician: Visit date not found   Next office visit with prescribing clinician: 1/30/2024     Additional details provided by patient: PATIENT OUT OF THIS CREAM.    Does the patient have less than a 3 day supply:  [x] Yes  [] No    Would you like a call back once the refill request has been completed: [] Yes [x] No    If the office needs to give you a call back, can they leave a voicemail: [] Yes [x] No    Eileen Cabrera   07/27/23 15:09 EDT           "

## 2023-08-14 RX ORDER — EVOLOCUMAB 140 MG/ML
INJECTION, SOLUTION SUBCUTANEOUS
Qty: 2 ML | Refills: 2 | Status: SHIPPED | OUTPATIENT
Start: 2023-08-14

## 2023-08-15 RX ORDER — LEVOTHYROXINE SODIUM 0.1 MG/1
100 TABLET ORAL DAILY
Qty: 90 TABLET | Refills: 3 | Status: SHIPPED | OUTPATIENT
Start: 2023-08-15

## 2023-08-16 RX ORDER — EVOLOCUMAB 140 MG/ML
INJECTION, SOLUTION SUBCUTANEOUS
Qty: 2 ML | Refills: 2 | OUTPATIENT
Start: 2023-08-16

## 2023-09-21 NOTE — PROGRESS NOTES
30-Day / 10-Visit Progress and Discharge Note         Patient: Candy Garcia   : 1955  Diagnosis/ICD-10 Code:  Left hip pain [M25.552]  Referring practitioner: Eduardo Greco*  Date of Initial Visit: Type: THERAPY  Noted: 2021  Today's Date: 10/14/2021  Patient seen for 19 sessions      Subjective:     Clinical Progress: improved  Home Program Compliance: Yes  Treatment has included:  therapeutic exercise, manual therapy, ultrasound and patient education with home exercise program     Subjective I did well on vacation but was afraid to ride bicycle because I was afraid I would flare up hip and it is doing so well.  I did some walking on beach but not too far.    Objective     See Exercise, Manual, and Modality Logs for complete treatment.   Discussed her activity level on vacation and planned activity moving forwards for walking     Functional Outcome Score: LEFS 84% ability     Assessment & Plan     Assessment  Assessment details: Candy Garcia is a 65 y.o. year-old female referred to physical therapy for L hip pain due to greater trochanteric bursitis and psoas tendonitis. She has been seen for 19 physical therapy sessions and was on extended vacation this past month.  She did well on vacation with walking and no increased hip pain.  She has some mild tightness of the left ITB but overall much better.  She is discharged to [facility].  Prognosis: good    Goals  Plan Goals: STGs to be completed within 30 days:  -Patient will demonstrate compliance and independence with initial HEP  Met  -Patient will increase L Hip ER strength to 4-/5 to help stabilize hip during gait  Met  -Patient will perform sit to stand transfer with equilateral WB and no UE assistance  Met  -Patient will ambulate household distances with <4/10 pain to allow patient to complete ADLs  Met  LTGs to be completed within 90 days:  -Patient will increase L Hip ER strength to 4/5 to help increase ease with putting on shoes/socks  Met    -Patient will complete community mobility 20 min or more with <2/10 pain to allow patient to walk for leisure  Met  -Patient will improve score on LEFS from 30 at eval to 50 or greater to improve quality of life  Met   -Patient will progress her walking for leisure past one mile and on trails  Met            Recommendations: Discharge  Timeframe: 1 week  Prognosis to achieve goals: good    PT Signature: Vickie Hooper, PT      Based upon review of the patient's progress and continued therapy plan, it is my medical opinion that Candy Garcia should continue physical therapy treatment at [facility]  [address] [place] [address] [address] [place] [address]  [phone].    Signature: __________________________________  Eduardo Rodrigues MD    Timed:  Manual Therapy:    30     mins  91480;  Therapeutic Exercise:    0     mins  50823;     Neuromuscular Salvador:    0    mins  31104;    Therapeutic Activity:     5     mins  83813;     Gait Trainin     mins  36584;     Ultrasound:     10     mins  75460;    Electrical Stimulation:    0     mins  87831 ( );  Iontophoresis    0     mins 44892;  Dry Needling              0     mins    Untimed:  Electrical Stimulation:    0     mins  46606 ( );  Mechanical Traction:    0     mins  61079;     Timed Treatment:   45   mins   Total Treatment:     45   mins             patient

## 2023-09-26 NOTE — TELEPHONE ENCOUNTER
"Caller: Candy Garcia \"Candy Chung\"    Relationship: Self    Best call back number: [phone]     Requested Prescriptions:   Requested Prescriptions     Pending Prescriptions Disp Refills    dicyclomine (BENTYL) 10 MG capsule 100 capsule 2        Pharmacy where request should be sent: [id number] [address] - [phone] PH - [phone] FX     Last office visit with prescribing clinician: 7/20/2023   Last telemedicine visit with prescribing clinician: Visit date not found   Next office visit with prescribing clinician: 1/30/2024     Does the patient have less than a 3 day supply:  [] Yes  [x] No    Would you like a call back once the refill request has been completed: [] Yes [x] No    If the office needs to give you a call back, can they leave a voicemail: [] Yes [x] No    Angel Beatty Rep   09/26/23 13:12 EDT       "

## 2023-09-27 RX ORDER — DICYCLOMINE HYDROCHLORIDE 10 MG/1
10 CAPSULE ORAL
Qty: 100 CAPSULE | Refills: 2 | Status: SHIPPED | OUTPATIENT
Start: 2023-09-27 | End: 2023-09-29

## 2023-09-28 NOTE — TELEPHONE ENCOUNTER
Rx Refill Note  Requested Prescriptions     Pending Prescriptions Disp Refills    dicyclomine (BENTYL) 10 MG capsule [Pharmacy Med Name: DICYCLOMINE 10MG CAPSULES] 360 capsule      Sig: TAKE 1 CAPSULE BY MOUTH FOUR TIMES DAILY BEFORE MEALS AND AT BEDTIME      Last office visit with prescribing clinician: 7/20/2023   Last telemedicine visit with prescribing clinician: Visit date not found   Next office visit with prescribing clinician: 1/30/2024                         Would you like a call back once the refill request has been completed: [] Yes [] No    If the office needs to give you a call back, can they leave a voicemail: [] Yes [] No    Yvette Alfredo MA  09/28/23, 11:21 EDT

## 2023-09-29 RX ORDER — DICYCLOMINE HYDROCHLORIDE 10 MG/1
CAPSULE ORAL
Qty: 360 CAPSULE | Refills: 0 | Status: SHIPPED | OUTPATIENT
Start: 2023-09-29

## 2023-10-19 ENCOUNTER — TRANSCRIBE ORDERS (OUTPATIENT)
Dept: BONE DENSITY | Facility: HOSPITAL | Age: 68
End: 2023-10-19
Payer: MEDICARE

## 2023-10-19 ENCOUNTER — HOSPITAL ENCOUNTER (OUTPATIENT)
Dept: PET IMAGING | Facility: HOSPITAL | Age: 68
Discharge: HOME | End: 2023-10-19
Admitting: SPECIALIST
Payer: MEDICARE

## 2023-10-19 DIAGNOSIS — M81.0 HIGH RISK FOR FRACTURE DUE TO OSTEOPOROSIS BY DEXA SCAN: Primary | ICD-10-CM

## 2023-10-19 PROCEDURE — 77080 DXA BONE DENSITY AXIAL: CPT

## 2023-10-20 RX ORDER — EVOLOCUMAB 140 MG/ML
INJECTION, SOLUTION SUBCUTANEOUS
Qty: 2 ML | Refills: 2 | Status: SHIPPED | OUTPATIENT
Start: 2023-10-20

## 2023-10-20 NOTE — TELEPHONE ENCOUNTER
Rx Refill Note  Requested Prescriptions     Pending Prescriptions Disp Refills    Repatha SureClick solution auto-injector SureClick injection [Pharmacy Med Name: REPATHA SRCLK 140MG/ML PF AUTO INJ] 2 mL 2     Sig: ADMINISTER 1 ML UNDER THE SKIN EVERY 14 DAYS AS DIRECTED      Last office visit with prescribing clinician: 7/20/2023   Last telemedicine visit with prescribing clinician: Visit date not found   Next office visit with prescribing clinician: 1/30/2024                         Would you like a call back once the refill request has been completed: [] Yes [] No    If the office needs to give you a call back, can they leave a voicemail: [] Yes [] No    Yvette Alfredo MA  10/20/23, 07:55 EDT

## 2023-11-06 ENCOUNTER — TELEPHONE (OUTPATIENT)
Dept: GASTROENTEROLOGY | Facility: CLINIC | Age: 68
End: 2023-11-06
Payer: MEDICARE

## 2023-11-06 ENCOUNTER — OFFICE VISIT (OUTPATIENT)
Dept: GASTROENTEROLOGY | Facility: CLINIC | Age: 68
End: 2023-11-06
Payer: MEDICARE

## 2023-11-06 VITALS
DIASTOLIC BLOOD PRESSURE: 82 MMHG | HEART RATE: 73 BPM | BODY MASS INDEX: 31.82 KG/M2 | SYSTOLIC BLOOD PRESSURE: 129 MMHG | TEMPERATURE: 96.8 F | HEIGHT: 64 IN | WEIGHT: 186.4 LBS

## 2023-11-06 DIAGNOSIS — R10.13 DYSPEPSIA: ICD-10-CM

## 2023-11-06 DIAGNOSIS — R93.3 ABNORMAL CT SCAN, ESOPHAGUS: Primary | ICD-10-CM

## 2023-11-06 PROCEDURE — 99214 OFFICE O/P EST MOD 30 MIN: CPT | Performed by: NURSE PRACTITIONER

## 2023-11-06 PROCEDURE — 1159F MED LIST DOCD IN RCRD: CPT | Performed by: NURSE PRACTITIONER

## 2023-11-06 PROCEDURE — 3079F DIAST BP 80-89 MM HG: CPT | Performed by: NURSE PRACTITIONER

## 2023-11-06 PROCEDURE — 3074F SYST BP LT 130 MM HG: CPT | Performed by: NURSE PRACTITIONER

## 2023-11-06 PROCEDURE — 1160F RVW MEDS BY RX/DR IN RCRD: CPT | Performed by: NURSE PRACTITIONER

## 2023-11-06 RX ORDER — PANTOPRAZOLE SODIUM 40 MG/1
40 TABLET, DELAYED RELEASE ORAL 2 TIMES DAILY
Qty: 180 TABLET | Refills: 3 | Status: SHIPPED | OUTPATIENT
Start: 2023-11-06

## 2023-11-06 NOTE — H&P (VIEW-ONLY)
Chief Complaint   Patient presents with    Abnormal CT of the esophagus       HPI    Candy Garcia is a  68 y.o. female here to establish care as a new patient for abnormal CT of the esophagus.    This patient will also follow with Dr. Chambers.    Past medical history of fatty liver disease, allergies, asthma, depression, thyroid disease, GERD, hyperlipidemia, hypertension along with irritable bowel syndrome and stroke.    According to her primary care provider's note she had a CT A/P at [facility] while hospitalized for scalp laceration showing mild thickening of the wall of the esophagus with endoscopic follow-up recommended.     On visit today she reports longstanding issues with GERD.  Has been on omeprazole and supplements with Pepcid in the evening for at least 5 years.  She has significant dyspepsia especially if she eats too close to bedtime.  Denies nausea, vomiting, dysphagia, odynophagia, poor appetite or weight loss.  She is not on antireflux diet.    No diarrhea, constipation or rectal bleeding.    Her last colonoscopy was 8 years ago and normal.  No family history of colon cancer.    Past Medical History:   Diagnosis Date    Acute pansinusitis     Allergic     Anemia     Arthritis 08/07/2018    Asthma     Depression     Disease of thyroid gland     GERD (gastroesophageal reflux disease)     Hip pain, acute, left     Hypercholesterolemia     Hyperlipidemia     Hypertension     Hypothyroidism     Irritable bowel syndrome 01/19/2016    Obesity     overweight    Raynaud's phenomenon     Right-sided lacunar infarction 12/01/2016    Stroke     Tremor 01/19/2016       Past Surgical History:   Procedure Laterality Date    COLONOSCOPY      DILATATION AND CURETTAGE  2015    JOINT REPLACEMENT  8/17 & 10/18    Left hip    THYROID SURGERY      TONSILLECTOMY      TOTAL HIP ARTHROPLASTY REVISION  08/01/2017       Scheduled Meds:     Continuous Infusions: No current facility-administered medications for this  visit.      PRN Meds:     Allergies   Allergen Reactions    Penicillins Anaphylaxis     Occurred when pt was 16 years old    Atorvastatin Myalgia    Rosuvastatin Myalgia       Social History     Socioeconomic History    Marital status:    Tobacco Use    Smoking status: Never    Smokeless tobacco: Never   Vaping Use    Vaping Use: Never used   Substance and Sexual Activity    Alcohol use: Yes     Alcohol/week: 2.0 standard drinks of alcohol     Types: 2 Glasses of wine per week     Comment: daily caffiene    Drug use: No    Sexual activity: Not Currently     Partners: Male     Birth control/protection: Post-menopausal       Family History   Problem Relation Age of Onset    Crohn's disease Mother     Lupus Sister         Lupus Exedens    Fibromyalgia Sister     Thyroid disease Sister     Hypertension Father     Thyroid disease Father     Hyperlipidemia Father     Stroke Maternal Grandfather     Depression Paternal Grandmother     Thyroid disease Sister     Thyroid disease Sister     Celiac disease Sister     Thyroid disease Sister     Crohn's disease Maternal Grandmother        Review of Systems   Gastrointestinal:         Dyspepsia       Vitals:    11/06/23 1013   BP: 129/82   Pulse: 73   Temp: 96.8 °F (36 °C)       Physical Exam  Constitutional:       Appearance: She is well-developed.   Abdominal:      General: Bowel sounds are normal. There is no distension.      Palpations: Abdomen is soft. There is no mass.      Tenderness: There is no abdominal tenderness. There is no guarding.      Hernia: No hernia is present.   Skin:     General: Skin is warm and dry.      Capillary Refill: Capillary refill takes less than 2 seconds.   Neurological:      Mental Status: She is alert and oriented to person, place, and time.   Psychiatric:         Behavior: Behavior normal.     Assessment    Diagnoses and all orders for this visit:    1. Abnormal CT scan, esophagus (Primary)  -     Case Request; Standing  -     Obtain  Informed Consent; Standing  -     Case Request    2. Dyspepsia  -     Case Request; Standing  -     Obtain Informed Consent; Standing  -     Case Request    Other orders  -     pantoprazole (PROTONIX) 40 MG EC tablet; Take 1 tablet by mouth 2 (Two) Times a Day.  Dispense: 180 tablet; Refill: 3       Plan    Proceed to EGD with Dr. Chambers for further evaluation  Obtain a copy of CT report for review  Stop omeprazole  Follow an antireflux diet  Begin Protonix 40 mg p.o. twice daily  Further recommendations pending endoscopic evaluation         TARAS Cobos  [facility]  [address] [place] [address]  Office: [phone]

## 2023-11-06 NOTE — TELEPHONE ENCOUNTER
Called [facility] and she stated that the most recent Ct of the abdomen was in March     ----- Message from TARAS Cobos sent at 11/6/2023 11:05 AM EST -----  Can we get a copy of CT abdomen and pelvis roughly 3 months ago at [facility] thanks

## 2023-11-06 NOTE — PROGRESS NOTES
Chief Complaint   Patient presents with    Abnormal CT of the esophagus       HPI    Candy Garcia is a  68 y.o. female here to establish care as a new patient for abnormal CT of the esophagus.    This patient will also follow with Dr. Chambers.    Past medical history of fatty liver disease, allergies, asthma, depression, thyroid disease, GERD, hyperlipidemia, hypertension along with irritable bowel syndrome and stroke.    According to her primary care provider's note she had a CT A/P at [facility] while hospitalized for scalp laceration showing mild thickening of the wall of the esophagus with endoscopic follow-up recommended.     On visit today she reports longstanding issues with GERD.  Has been on omeprazole and supplements with Pepcid in the evening for at least 5 years.  She has significant dyspepsia especially if she eats too close to bedtime.  Denies nausea, vomiting, dysphagia, odynophagia, poor appetite or weight loss.  She is not on antireflux diet.    No diarrhea, constipation or rectal bleeding.    Her last colonoscopy was 8 years ago and normal.  No family history of colon cancer.    Past Medical History:   Diagnosis Date    Acute pansinusitis     Allergic     Anemia     Arthritis 08/07/2018    Asthma     Depression     Disease of thyroid gland     GERD (gastroesophageal reflux disease)     Hip pain, acute, left     Hypercholesterolemia     Hyperlipidemia     Hypertension     Hypothyroidism     Irritable bowel syndrome 01/19/2016    Obesity     overweight    Raynaud's phenomenon     Right-sided lacunar infarction 12/01/2016    Stroke     Tremor 01/19/2016       Past Surgical History:   Procedure Laterality Date    COLONOSCOPY      DILATATION AND CURETTAGE  2015    JOINT REPLACEMENT  8/17 & 10/18    Left hip    THYROID SURGERY      TONSILLECTOMY      TOTAL HIP ARTHROPLASTY REVISION  08/01/2017       Scheduled Meds:     Continuous Infusions: No current facility-administered medications for this  visit.      PRN Meds:     Allergies   Allergen Reactions    Penicillins Anaphylaxis     Occurred when pt was 16 years old    Atorvastatin Myalgia    Rosuvastatin Myalgia       Social History     Socioeconomic History    Marital status:    Tobacco Use    Smoking status: Never    Smokeless tobacco: Never   Vaping Use    Vaping Use: Never used   Substance and Sexual Activity    Alcohol use: Yes     Alcohol/week: 2.0 standard drinks of alcohol     Types: 2 Glasses of wine per week     Comment: daily caffiene    Drug use: No    Sexual activity: Not Currently     Partners: Male     Birth control/protection: Post-menopausal       Family History   Problem Relation Age of Onset    Crohn's disease Mother     Lupus Sister         Lupus Exedens    Fibromyalgia Sister     Thyroid disease Sister     Hypertension Father     Thyroid disease Father     Hyperlipidemia Father     Stroke Maternal Grandfather     Depression Paternal Grandmother     Thyroid disease Sister     Thyroid disease Sister     Celiac disease Sister     Thyroid disease Sister     Crohn's disease Maternal Grandmother        Review of Systems   Gastrointestinal:         Dyspepsia       Vitals:    11/06/23 1013   BP: 129/82   Pulse: 73   Temp: 96.8 °F (36 °C)       Physical Exam  Constitutional:       Appearance: She is well-developed.   Abdominal:      General: Bowel sounds are normal. There is no distension.      Palpations: Abdomen is soft. There is no mass.      Tenderness: There is no abdominal tenderness. There is no guarding.      Hernia: No hernia is present.   Skin:     General: Skin is warm and dry.      Capillary Refill: Capillary refill takes less than 2 seconds.   Neurological:      Mental Status: She is alert and oriented to person, place, and time.   Psychiatric:         Behavior: Behavior normal.     Assessment    Diagnoses and all orders for this visit:    1. Abnormal CT scan, esophagus (Primary)  -     Case Request; Standing  -     Obtain  Informed Consent; Standing  -     Case Request    2. Dyspepsia  -     Case Request; Standing  -     Obtain Informed Consent; Standing  -     Case Request    Other orders  -     pantoprazole (PROTONIX) 40 MG EC tablet; Take 1 tablet by mouth 2 (Two) Times a Day.  Dispense: 180 tablet; Refill: 3       Plan    Proceed to EGD with Dr. Chambers for further evaluation  Obtain a copy of CT report for review  Stop omeprazole  Follow an antireflux diet  Begin Protonix 40 mg p.o. twice daily  Further recommendations pending endoscopic evaluation         TARAS Cobos  [facility]  [address] [place] [address]  Office: [phone]

## 2023-11-13 ENCOUNTER — ANESTHESIA EVENT (OUTPATIENT)
Dept: GASTROENTEROLOGY | Facility: HOSPITAL | Age: 68
End: 2023-11-13
Payer: MEDICARE

## 2023-11-13 ENCOUNTER — HOSPITAL ENCOUNTER (OUTPATIENT)
Facility: HOSPITAL | Age: 68
Setting detail: HOSPITAL OUTPATIENT SURGERY
Discharge: HOME | End: 2023-11-13
Attending: INTERNAL MEDICINE | Admitting: INTERNAL MEDICINE
Payer: MEDICARE

## 2023-11-13 ENCOUNTER — ANESTHESIA (OUTPATIENT)
Dept: GASTROENTEROLOGY | Facility: HOSPITAL | Age: 68
End: 2023-11-13
Payer: MEDICARE

## 2023-11-13 VITALS
SYSTOLIC BLOOD PRESSURE: 114 MMHG | HEIGHT: 64 IN | OXYGEN SATURATION: 99 % | HEART RATE: 72 BPM | DIASTOLIC BLOOD PRESSURE: 64 MMHG | BODY MASS INDEX: 31.41 KG/M2 | RESPIRATION RATE: 18 BRPM | WEIGHT: 184 LBS

## 2023-11-13 DIAGNOSIS — R93.3 ABNORMAL CT SCAN, ESOPHAGUS: ICD-10-CM

## 2023-11-13 DIAGNOSIS — R10.13 DYSPEPSIA: ICD-10-CM

## 2023-11-13 PROCEDURE — 25810000003 LACTATED RINGERS PER 1000 ML: Performed by: INTERNAL MEDICINE

## 2023-11-13 PROCEDURE — 88305 TISSUE EXAM BY PATHOLOGIST: CPT | Performed by: INTERNAL MEDICINE

## 2023-11-13 PROCEDURE — 43239 EGD BIOPSY SINGLE/MULTIPLE: CPT | Performed by: INTERNAL MEDICINE

## 2023-11-13 PROCEDURE — 25010000002 PROPOFOL 10 MG/ML EMULSION: Performed by: REGISTERED NURSE

## 2023-11-13 RX ORDER — SODIUM CHLORIDE, SODIUM LACTATE, POTASSIUM CHLORIDE, CALCIUM CHLORIDE 600; 310; 30; 20 MG/100ML; MG/100ML; MG/100ML; MG/100ML
30 INJECTION, SOLUTION INTRAVENOUS CONTINUOUS PRN
Status: DISCONTINUED | OUTPATIENT
Start: 2023-11-13 | End: 2023-11-13 | Stop reason: HOSPADM

## 2023-11-13 RX ORDER — LIDOCAINE HYDROCHLORIDE 20 MG/ML
INJECTION, SOLUTION INFILTRATION; PERINEURAL AS NEEDED
Status: DISCONTINUED | OUTPATIENT
Start: 2023-11-13 | End: 2023-11-13 | Stop reason: SURG

## 2023-11-13 RX ORDER — PROPOFOL 10 MG/ML
VIAL (ML) INTRAVENOUS AS NEEDED
Status: DISCONTINUED | OUTPATIENT
Start: 2023-11-13 | End: 2023-11-13 | Stop reason: SURG

## 2023-11-13 RX ORDER — ONDANSETRON 2 MG/ML
4 INJECTION INTRAMUSCULAR; INTRAVENOUS ONCE AS NEEDED
Status: DISCONTINUED | OUTPATIENT
Start: 2023-11-13 | End: 2023-11-13 | Stop reason: HOSPADM

## 2023-11-13 RX ADMIN — SODIUM CHLORIDE, POTASSIUM CHLORIDE, SODIUM LACTATE AND CALCIUM CHLORIDE 30 ML/HR: 600; 310; 30; 20 INJECTION, SOLUTION INTRAVENOUS at 08:06

## 2023-11-13 RX ADMIN — PROPOFOL 50 MG: 10 INJECTION, EMULSION INTRAVENOUS at 08:33

## 2023-11-13 RX ADMIN — LIDOCAINE HYDROCHLORIDE 60 MG: 20 INJECTION, SOLUTION INFILTRATION; PERINEURAL at 08:30

## 2023-11-13 RX ADMIN — PROPOFOL 100 MG: 10 INJECTION, EMULSION INTRAVENOUS at 08:30

## 2023-11-13 RX ADMIN — PROPOFOL 50 MG: 10 INJECTION, EMULSION INTRAVENOUS at 08:31

## 2023-11-13 RX ADMIN — PROPOFOL 50 MG: 10 INJECTION, EMULSION INTRAVENOUS at 08:35

## 2023-11-13 NOTE — TELEPHONE ENCOUNTER
Called [facility] line. Left message requesting a copy of the patient's CT scan of abd and pelvis.   Requested the records be faxed to [phone].

## 2023-11-13 NOTE — ANESTHESIA POSTPROCEDURE EVALUATION
Patient: Candy Garcia    Procedure Summary       Date: 11/13/23 Room / Location:  [facility] /  [facility]    Anesthesia Start: 0820 Anesthesia Stop: 0843    Procedure: ESOPHAGOGASTRODUODENOSCOPY WITH BIOPSIES (Esophagus) Diagnosis:       Dyspepsia      Abnormal CT scan, esophagus      Gastritis      Hiatal hernia      Schatzki's ring      (Dyspepsia [R10.13])      (Abnormal CT scan, esophagus [R93.3])    Surgeons: Mikhail Chambers MD Provider: Austin Sánchez MD    Anesthesia Type: MAC ASA Status: 2            Anesthesia Type: MAC    Vitals  Vitals Value Taken Time   /64 11/13/23 0902   Temp     Pulse 75 11/13/23 0903   Resp 18 11/13/23 0902   SpO2 99 % 11/13/23 0903   Vitals shown include unfiled device data.        Post Anesthesia Care and Evaluation    Patient location during evaluation: PACU  Patient participation: complete - patient participated  Level of consciousness: awake and alert  Pain management: adequate    Airway patency: patent  Anesthetic complications: No anesthetic complications    Cardiovascular status: acceptable  Respiratory status: acceptable  Hydration status: acceptable    Comments: --------------------            11/13/23               0902     --------------------   BP:       114/64     Pulse:      72       Resp:       18       SpO2:      99%      --------------------

## 2023-11-13 NOTE — BRIEF OP NOTE
ESOPHAGOGASTRODUODENOSCOPY  Progress Note    Candy Garcia  11/13/2023    Pre-op Diagnosis:   Dyspepsia [R10.13]  Abnormal CT scan, esophagus [R93.3]       Post-Op Diagnosis Codes:     * Dyspepsia [R10.13]     * Abnormal CT scan, esophagus [R93.3]     * Gastritis [K29.70]     * Hiatal hernia [K44.9]     * Schatzki's ring [K22.2]    Procedure/CPT® Codes:        Procedure(s):  ESOPHAGOGASTRODUODENOSCOPY WITH BIOPSIES              Surgeon(s):  Mikhail Chambers MD    Anesthesia: Monitored Anesthesia Care    Staff:   Endo Technician: Elvia Delacruz  Endo Nurse: Sofia Henry RN         Estimated Blood Loss: minimal    Urine Voided: * No values recorded between 11/13/2023  8:20 AM and 11/13/2023  8:35 AM *    Specimens:                Specimens       ID Source Type Tests Collected By Collected At Frozen?    A Gastric, Antrum Tissue TISSUE PATHOLOGY EXAM   Mikhail Chambers MD 11/13/23 0832     Description: GASTRIC ANTRUM BIOPSIES    This specimen was not marked as sent.    B Esophagus, Distal Tissue TISSUE PATHOLOGY EXAM   Mikhail Chambers MD 11/13/23 0833     Description: DISTAL ESPOPHAGUS BIOPSIES    This specimen was not marked as sent.                  Drains: * No LDAs found *    Findings: EGD with biopsies performed revealed normal duodenal mucosa throughout.  Mild antral gastritis was seen biopsies obtained.  Retroflexed view the GE junction revealed sliding hiatal hernia.  Scattered hyperplastic polyps noted in the gastric mucosa.  Schatzki's ring noted minimally narrowed, clinically insignificant.  Biopsies of the distal esophagus obtained.  The remainder of the esophageal mucosa was normal.        Complications: None          Mikhail Chambers MD     Date: 11/13/2023  Time: 08:36 EST

## 2023-11-13 NOTE — DISCHARGE INSTRUCTIONS

## 2023-11-13 NOTE — ANESTHESIA PREPROCEDURE EVALUATION
Anesthesia Evaluation     history of anesthetic complications:  PONV               Airway   Mallampati: II  TM distance: >3 FB  Neck ROM: full  Dental      Pulmonary    (+) asthma,  Cardiovascular     Rhythm: regular  Rate: normal    (+) hypertension, hyperlipidemia      Neuro/Psych  (+) CVA, tremors, psychiatric history Anxiety and Depression  GI/Hepatic/Renal/Endo    (+) obesity, GERD, thyroid problem     Musculoskeletal     Abdominal    Substance History   (+) alcohol use     OB/GYN          Other   arthritis,                   Anesthesia Plan    ASA 2     MAC     intravenous induction     Anesthetic plan, risks, benefits, and alternatives have been provided, discussed and informed consent has been obtained with: patient.    Plan discussed with CRNA.    CODE STATUS:

## 2023-11-14 LAB
LAB AP CASE REPORT: NORMAL
PATH REPORT.FINAL DX SPEC: NORMAL
PATH REPORT.GROSS SPEC: NORMAL

## 2023-11-15 RX ORDER — DULOXETIN HYDROCHLORIDE 60 MG/1
CAPSULE, DELAYED RELEASE ORAL
Qty: 90 CAPSULE | Refills: 1 | Status: SHIPPED | OUTPATIENT
Start: 2023-11-15

## 2023-11-28 ENCOUNTER — TELEPHONE (OUTPATIENT)
Dept: GASTROENTEROLOGY | Facility: CLINIC | Age: 68
End: 2023-11-28
Payer: MEDICARE

## 2023-11-28 NOTE — TELEPHONE ENCOUNTER
"  Caller: Candy Garcia \"Cecile\"    Relationship: Self    Best call back number: [phone]     Which medication are you concerned about: pantoprazole (PROTONIX) 40 MG EC     Who prescribed you this medication: [facility]    When did you start taking this medication: PRESCRIBED ON 11/6/23    What are your concerns: PATIENT STATES THAT SHE WAS READING INFORMATION ABOUT MEDICATION AND FOUND OUT THAT IT COULD WORSEN LUPUS. PATIENT HAS A HX OF SYSTEMIC LUPUS, SHE IS CONCERNED BECAUSE SHE REPORTS THAT THIS HAS BEEN WELL CONTROLLED, BUT SHE IS CONCERNED ABOUT MEDICATION WORSENING HER SYMPTOMS. SHE WAS WANTING TO KNOW IF MAYBE THERE WAS ANOTHER MEDICATION SHE COULD TAKE THAT WOULD NOT HAVE AN AFFECT ON HER LUPUS. PLEASE CALL BACK ASAP TO ADVISE.     "

## 2023-11-30 ENCOUNTER — TELEPHONE (OUTPATIENT)
Dept: GASTROENTEROLOGY | Facility: CLINIC | Age: 68
End: 2023-11-30

## 2023-11-30 NOTE — TELEPHONE ENCOUNTER
Call to patient per Dr. Chambers's results and recommendations.   Patient verbalized understanding       Patient had a bad day yesterday after eating chillie. Other than that she is doing well. The patient states she has lupus and that the pantoprazole can cause problems with her lupus. She is just checking to see if she should be on it.     ----- Message from Mikhail Chambers MD sent at 11/25/2023  5:05 PM EST -----  Biopsies gastritis and esophagitis Check on symptoms

## 2023-12-04 NOTE — TELEPHONE ENCOUNTER
Per Dr. Chambers:     There is a very rare occurrence of drug-induced lupus from PPIs specifically esomeprazole but not a worsening of symptoms of existing lupus.  If lupus is stable no indication to stop unless the PPI was started before the lupus symptoms began.

## 2023-12-07 ENCOUNTER — TELEPHONE (OUTPATIENT)
Dept: GASTROENTEROLOGY | Facility: CLINIC | Age: 68
End: 2023-12-07
Payer: MEDICARE

## 2023-12-07 NOTE — TELEPHONE ENCOUNTER
Called , the CT of abd/pelvis was done in March, 2023.     Scanned under media.  Msg sent to ESPINOZA Mittal.

## 2023-12-07 NOTE — TELEPHONE ENCOUNTER
----- Message from TARAS Cobos sent at 11/6/2023 11:05 AM EST -----  Can we get a copy of CT abdomen and pelvis roughly 3 months ago at [facility] thanks

## 2023-12-07 NOTE — TELEPHONE ENCOUNTER
Reviewed CT dated 3/6/2023 with questionable mild thickening of the wall of the esophageal gastric junction.

## 2024-01-22 RX ORDER — EVOLOCUMAB 140 MG/ML
INJECTION, SOLUTION SUBCUTANEOUS
Qty: 2 ML | Refills: 2 | Status: SHIPPED | OUTPATIENT
Start: 2024-01-22

## 2024-01-22 NOTE — TELEPHONE ENCOUNTER
Rx Refill Note  Requested Prescriptions     Pending Prescriptions Disp Refills    Repatha SureClick solution auto-injector SureClick injection [Pharmacy Med Name: REPATHA SRCLK 140MG/ML PF AUTO INJ] 2 mL 2     Sig: ADMINISTER 1 ML UNDER THE SKIN EVERY 14 DAYS AS DIRECTED      Last office visit with prescribing clinician: 7/20/2023   Last telemedicine visit with prescribing clinician: Visit date not found   Next office visit with prescribing clinician: 1/30/2024

## 2024-01-23 DIAGNOSIS — I10 ESSENTIAL HYPERTENSION: Primary | ICD-10-CM

## 2024-01-23 DIAGNOSIS — E03.9 HYPOTHYROIDISM, UNSPECIFIED TYPE: ICD-10-CM

## 2024-01-23 DIAGNOSIS — E78.5 HYPERLIPIDEMIA LDL GOAL <70: ICD-10-CM

## 2024-01-26 LAB
ALBUMIN SERPL-MCNC: 4.3 G/DL (ref 3.5–5.2)
ALBUMIN/GLOB SERPL: 1.9 G/DL
ALP SERPL-CCNC: 125 U/L (ref 39–117)
ALT SERPL-CCNC: 33 U/L (ref 1–33)
AST SERPL-CCNC: 21 U/L (ref 1–32)
BASOPHILS # BLD AUTO: 0.04 10*3/MM3 (ref 0–0.2)
BASOPHILS NFR BLD AUTO: 0.8 % (ref 0–1.5)
BILIRUB SERPL-MCNC: 0.6 MG/DL (ref 0–1.2)
BUN SERPL-MCNC: 13 MG/DL (ref 8–23)
BUN/CREAT SERPL: 16.7 (ref 7–25)
CALCIUM SERPL-MCNC: 9.8 MG/DL (ref 8.6–10.5)
CHLORIDE SERPL-SCNC: 102 MMOL/L (ref 98–107)
CHOLEST SERPL-MCNC: 140 MG/DL (ref 0–200)
CHOLEST/HDLC SERPL: 1.84 {RATIO}
CO2 SERPL-SCNC: 28.3 MMOL/L (ref 22–29)
CREAT SERPL-MCNC: 0.78 MG/DL (ref 0.57–1)
EGFRCR SERPLBLD CKD-EPI 2021: 83.4 ML/MIN/1.73
EOSINOPHIL # BLD AUTO: 0.12 10*3/MM3 (ref 0–0.4)
EOSINOPHIL NFR BLD AUTO: 2.4 % (ref 0.3–6.2)
ERYTHROCYTE [DISTWIDTH] IN BLOOD BY AUTOMATED COUNT: 13.1 % (ref 12.3–15.4)
GLOBULIN SER CALC-MCNC: 2.3 GM/DL
GLUCOSE SERPL-MCNC: 90 MG/DL (ref 65–99)
HCT VFR BLD AUTO: 44.9 % (ref 34–46.6)
HDLC SERPL-MCNC: 76 MG/DL (ref 40–60)
HGB BLD-MCNC: 14.8 G/DL (ref 12–15.9)
IMM GRANULOCYTES # BLD AUTO: 0.01 10*3/MM3 (ref 0–0.05)
IMM GRANULOCYTES NFR BLD AUTO: 0.2 % (ref 0–0.5)
LDLC SERPL CALC-MCNC: 41 MG/DL (ref 0–100)
LYMPHOCYTES # BLD AUTO: 1.44 10*3/MM3 (ref 0.7–3.1)
LYMPHOCYTES NFR BLD AUTO: 29 % (ref 19.6–45.3)
MCH RBC QN AUTO: 29.2 PG (ref 26.6–33)
MCHC RBC AUTO-ENTMCNC: 33 G/DL (ref 31.5–35.7)
MCV RBC AUTO: 88.7 FL (ref 79–97)
MONOCYTES # BLD AUTO: 0.28 10*3/MM3 (ref 0.1–0.9)
MONOCYTES NFR BLD AUTO: 5.6 % (ref 5–12)
NEUTROPHILS # BLD AUTO: 3.07 10*3/MM3 (ref 1.7–7)
NEUTROPHILS NFR BLD AUTO: 62 % (ref 42.7–76)
NRBC BLD AUTO-RTO: 0 /100 WBC (ref 0–0.2)
PLATELET # BLD AUTO: 225 10*3/MM3 (ref 140–450)
POTASSIUM SERPL-SCNC: 4.7 MMOL/L (ref 3.5–5.2)
PROT SERPL-MCNC: 6.6 G/DL (ref 6–8.5)
RBC # BLD AUTO: 5.06 10*6/MM3 (ref 3.77–5.28)
SODIUM SERPL-SCNC: 140 MMOL/L (ref 136–145)
T4 FREE SERPL-MCNC: 1.35 NG/DL (ref 0.93–1.7)
TRIGL SERPL-MCNC: 134 MG/DL (ref 0–150)
TSH SERPL DL<=0.005 MIU/L-ACNC: 0.46 UIU/ML (ref 0.27–4.2)
VLDLC SERPL CALC-MCNC: 23 MG/DL (ref 5–40)
WBC # BLD AUTO: 4.96 10*3/MM3 (ref 3.4–10.8)

## 2024-01-30 ENCOUNTER — OFFICE VISIT (OUTPATIENT)
Dept: FAMILY MEDICINE CLINIC | Facility: CLINIC | Age: 69
End: 2024-01-30
Payer: MEDICARE

## 2024-01-30 VITALS
BODY MASS INDEX: 31.77 KG/M2 | OXYGEN SATURATION: 98 % | WEIGHT: 186.1 LBS | RESPIRATION RATE: 15 BRPM | DIASTOLIC BLOOD PRESSURE: 68 MMHG | TEMPERATURE: 96.2 F | SYSTOLIC BLOOD PRESSURE: 108 MMHG | HEIGHT: 64 IN | HEART RATE: 62 BPM

## 2024-01-30 DIAGNOSIS — E03.9 HYPOTHYROIDISM, UNSPECIFIED TYPE: ICD-10-CM

## 2024-01-30 DIAGNOSIS — E78.5 HYPERLIPIDEMIA LDL GOAL <70: ICD-10-CM

## 2024-01-30 DIAGNOSIS — I10 ESSENTIAL HYPERTENSION: Primary | ICD-10-CM

## 2024-01-30 NOTE — PROGRESS NOTES
Subjective   Candy Garcia is a 68 y.o. female. Patient is here today for   Chief Complaint   Patient presents with    Hyperlipidemia          Vitals:    01/30/24 0953   BP: 108/68   Pulse: 62   Resp: 15   Temp: 96.2 °F (35.7 °C)   SpO2: 98%     Body mass index is 31.93 kg/m².      Past Medical History:   Diagnosis Date    Acute pansinusitis     Allergic     Anemia     Arthritis 08/07/2018    Asthma     Depression     Disease of thyroid gland     GERD (gastroesophageal reflux disease)     Head trauma 03/06/2023    pt fell out of attic on head moving furniture    Hip pain, acute, left     Hypercholesterolemia     Hyperlipidemia     Hypertension     Hypothyroidism     Irritable bowel syndrome 01/19/2016    Obesity     overweight    PONV (postoperative nausea and vomiting)     Raynaud's phenomenon     Right-sided lacunar infarction 12/01/2016    Stroke     Tremor 01/19/2016      Allergies   Allergen Reactions    Penicillins Anaphylaxis     Occurred when pt was 16 years old    Atorvastatin Myalgia    Rosuvastatin Myalgia      Social History     Socioeconomic History    Marital status:    Tobacco Use    Smoking status: Never    Smokeless tobacco: Never   Vaping Use    Vaping Use: Never used   Substance and Sexual Activity    Alcohol use: Yes     Alcohol/week: 2.0 standard drinks of alcohol     Types: 2 Glasses of wine per week     Comment: daily caffiene    Drug use: No    Sexual activity: Not Currently     Partners: Male     Birth control/protection: Post-menopausal        Current Outpatient Medications:     acetaminophen (TYLENOL) 325 MG tablet, TAKE 2 TABLETS BY MOUTH FOUR TIMES DAILY AS NEEDED FOR PAIN, Disp: , Rfl:     aspirin  MG tablet, TAKE ONE TABLET BY MOUTH DAILY, Disp: 90 tablet, Rfl: 1    cetirizine (zyrTEC) 5 MG tablet, Take 1 tablet by mouth Daily., Disp: , Rfl:     dicyclomine (BENTYL) 10 MG capsule, TAKE 1 CAPSULE BY MOUTH FOUR TIMES DAILY BEFORE MEALS AND AT BEDTIME, Disp: 360 capsule, Rfl:  0    DULoxetine (CYMBALTA) 60 MG capsule, TAKE 1 CAPSULE BY MOUTH EVERY DAY, Disp: 90 capsule, Rfl: 1    ferrous sulfate 325 (65 FE) MG tablet, Take 1 tablet by mouth Daily With Breakfast., Disp: , Rfl:     levothyroxine (SYNTHROID, LEVOTHROID) 100 MCG tablet, TAKE 1 TABLET BY MOUTH DAILY, Disp: 90 tablet, Rfl: 3    lisinopril-hydrochlorothiazide (PRINZIDE,ZESTORETIC) 20-12.5 MG per tablet, TAKE 1 TABLET BY MOUTH EVERY DAY, Disp: 90 tablet, Rfl: 3    Multiple Vitamins-Minerals (CENTRUM SILVER 50+WOMEN PO), Take  by mouth Daily., Disp: , Rfl:     Multiple Vitamins-Minerals (EMERGEN-C IMMUNE PLUS PO), Take 1 capsule by mouth Daily., Disp: , Rfl:     pantoprazole (PROTONIX) 40 MG EC tablet, Take 1 tablet by mouth 2 (Two) Times a Day., Disp: 180 tablet, Rfl: 3    Repatha SureClick solution auto-injector SureClick injection, ADMINISTER 1 ML UNDER THE SKIN EVERY 14 DAYS AS DIRECTED, Disp: 2 mL, Rfl: 2    triamcinolone (KENALOG) 0.1 % cream, Apply  topically to the appropriate area as directed 3 (Three) Times a Day., Disp: 28.4 g, Rfl: 0     Objective     History of Present Illness  She is here to follow-up on hypercholesterolemia.    She is feeling very well physically and emotionally.    She frequently looks after her 2-year-old grandson who lives in [facility].      Hyperlipidemia         Review of Systems   Constitutional: Negative.    HENT: Negative.     Respiratory: Negative.     Cardiovascular: Negative.    Musculoskeletal: Negative.    Psychiatric/Behavioral: Negative.         Physical Exam  Constitutional:       Appearance: Normal appearance. She is obese. She is not ill-appearing or diaphoretic.      Comments: Pleasant, neatly groomed, no distress.  BMI 31.   Cardiovascular:      Rate and Rhythm: Regular rhythm.      Heart sounds: Normal heart sounds. No murmur heard.     No gallop.   Pulmonary:      Effort: No respiratory distress.      Breath sounds: Normal breath sounds. No wheezing or rales.    Neurological:      Mental Status: She is alert and oriented to person, place, and time.   Psychiatric:         Mood and Affect: Mood normal.         Thought Content: Thought content normal.           Problems Addressed this Visit          Cardiac and Vasculature    Essential hypertension - Primary    Hyperlipidemia LDL goal <70       Endocrine and Metabolic    Hypothyroidism     Diagnoses         Codes Comments    Essential hypertension    -  Primary ICD-10-CM: I10  ICD-9-CM: 401.9     Hyperlipidemia LDL goal <70     ICD-10-CM: E78.5  ICD-9-CM: 272.4     Hypothyroidism, unspecified type     ICD-10-CM: E03.9  ICD-9-CM: 244.9               PLAN  Her hypertension is well-controlled.    She has hypercholesterolemia with excellent control on Repatha.    Her hypothyroidism is appropriately replaced.    She declined a flu shot.  She was ready to except Prevnar 20 today which we gave her.    I asked her to follow-up in about 6 months for a follow-up visit on hypertension, hypercholesterolemia, and hypothyroidism.    If she is due for Medicare annual wellness visit she will follow-up then.    Fasting labs a week before that visit should include: Lipid profile, TSH, free T4, comprehensive metabolic panel,  No follow-ups on file.  Answers submitted by the patient for this visit:  Other (Submitted on 1/28/2024)  Please describe your symptoms.: Check up, Thyroid levels and meds  Have you had these symptoms before?: Yes  How long have you been having these symptoms?: Greater than 2 weeks  Please list any medications you are currently taking for this condition.: levothyroxine 100mcg  Please describe any probable cause for these symptoms. : Ablation of Thryroid  Primary Reason for Visit (Submitted on 1/28/2024)  What is the primary reason for your visit?: Other

## 2024-03-29 RX ORDER — EVOLOCUMAB 140 MG/ML
INJECTION, SOLUTION SUBCUTANEOUS
Qty: 2 ML | Refills: 2 | Status: SHIPPED | OUTPATIENT
Start: 2024-03-29

## 2024-03-29 NOTE — TELEPHONE ENCOUNTER
Rx Refill Note  Requested Prescriptions     Pending Prescriptions Disp Refills    Repatha SureClick solution auto-injector SureClick injection [Pharmacy Med Name: REPATHA SRCLK 140MG/ML PF AUTO INJ] 2 mL 2     Sig: ADMINISTER 1 ML UNDER THE SKIN EVERY 14 DAYS AS DIRECTED      Last office visit with prescribing clinician: 1/30/2024   Last telemedicine visit with prescribing clinician: Visit date not found   Next office visit with prescribing clinician: 8/7/2024                         Would you like a call back once the refill request has been completed: [] Yes [] No    If the office needs to give you a call back, can they leave a voicemail: [] Yes [] No    Yvette Alfredo MA  03/29/24, 07:59 EDT

## 2024-04-12 RX ORDER — LISINOPRIL AND HYDROCHLOROTHIAZIDE 20; 12.5 MG/1; MG/1
TABLET ORAL
Qty: 90 TABLET | Refills: 0 | Status: SHIPPED | OUTPATIENT
Start: 2024-04-12

## 2024-04-15 ENCOUNTER — TELEPHONE (OUTPATIENT)
Dept: FAMILY MEDICINE CLINIC | Facility: CLINIC | Age: 69
End: 2024-04-15

## 2024-04-15 NOTE — TELEPHONE ENCOUNTER
"  Caller: Candy Garcia \"Candy Chung\"    Relationship: Self    Best call back number: [phone]    What orders are you requesting (i.e. lab or imaging): P.T. ON LEFT HIP    In what timeframe would the patient need to come in: ASAP    Where will you receive your lab/imaging services: [facility]    "

## 2024-04-18 ENCOUNTER — OFFICE VISIT (OUTPATIENT)
Dept: FAMILY MEDICINE CLINIC | Facility: CLINIC | Age: 69
End: 2024-04-18
Payer: MEDICARE

## 2024-04-18 VITALS
RESPIRATION RATE: 16 BRPM | BODY MASS INDEX: 31.58 KG/M2 | HEIGHT: 64 IN | HEART RATE: 95 BPM | SYSTOLIC BLOOD PRESSURE: 130 MMHG | WEIGHT: 185 LBS | OXYGEN SATURATION: 98 % | DIASTOLIC BLOOD PRESSURE: 84 MMHG

## 2024-04-18 DIAGNOSIS — M25.552 LEFT HIP PAIN: Primary | ICD-10-CM

## 2024-04-18 PROCEDURE — 3079F DIAST BP 80-89 MM HG: CPT | Performed by: INTERNAL MEDICINE

## 2024-04-18 PROCEDURE — 3075F SYST BP GE 130 - 139MM HG: CPT | Performed by: INTERNAL MEDICINE

## 2024-04-18 PROCEDURE — 99213 OFFICE O/P EST LOW 20 MIN: CPT | Performed by: INTERNAL MEDICINE

## 2024-04-18 NOTE — PROGRESS NOTES
Subjective   Candy Garcia is a 68 y.o. female. Patient is here today for   Chief Complaint   Patient presents with    Leg Pain     LEFT     Hip Pain     LEFT           Vitals:    04/18/24 1011   BP: 130/84   Pulse: 95   Resp: 16   SpO2: 98%     Body mass index is 31.74 kg/m².      Past Medical History:   Diagnosis Date    Acute pansinusitis     Allergic     Anemia     Arthritis 08/07/2018    Asthma     Depression     Disease of thyroid gland     GERD (gastroesophageal reflux disease)     Head trauma 03/06/2023    pt fell out of attic on head moving furniture    Hip pain, acute, left     Hypercholesterolemia     Hyperlipidemia     Hypertension     Hypothyroidism     Irritable bowel syndrome 01/19/2016    Obesity     overweight    PONV (postoperative nausea and vomiting)     Raynaud's phenomenon     Right-sided lacunar infarction 12/01/2016    Stroke     Tremor 01/19/2016      Allergies   Allergen Reactions    Penicillins Anaphylaxis     Occurred when pt was 16 years old    Atorvastatin Myalgia    Rosuvastatin Myalgia      Social History     Socioeconomic History    Marital status:    Tobacco Use    Smoking status: Never    Smokeless tobacco: Never   Vaping Use    Vaping status: Never Used   Substance and Sexual Activity    Alcohol use: Yes     Alcohol/week: 2.0 standard drinks of alcohol     Types: 2 Glasses of wine per week     Comment: daily caffiene    Drug use: No    Sexual activity: Not Currently     Partners: Male     Birth control/protection: Post-menopausal        Current Outpatient Medications:     acetaminophen (TYLENOL) 325 MG tablet, TAKE 2 TABLETS BY MOUTH FOUR TIMES DAILY AS NEEDED FOR PAIN, Disp: , Rfl:     aspirin  MG tablet, TAKE ONE TABLET BY MOUTH DAILY, Disp: 90 tablet, Rfl: 1    cetirizine (zyrTEC) 5 MG tablet, Take 1 tablet by mouth Daily., Disp: , Rfl:     dicyclomine (BENTYL) 10 MG capsule, TAKE 1 CAPSULE BY MOUTH FOUR TIMES DAILY BEFORE MEALS AND AT BEDTIME, Disp: 360 capsule,  "Rfl: 0    DULoxetine (CYMBALTA) 60 MG capsule, TAKE 1 CAPSULE BY MOUTH EVERY DAY, Disp: 90 capsule, Rfl: 1    ferrous sulfate 325 (65 FE) MG tablet, Take 1 tablet by mouth Daily With Breakfast., Disp: , Rfl:     levothyroxine (SYNTHROID, LEVOTHROID) 100 MCG tablet, TAKE 1 TABLET BY MOUTH DAILY, Disp: 90 tablet, Rfl: 3    lisinopril-hydrochlorothiazide (PRINZIDE,ZESTORETIC) 20-12.5 MG per tablet, TAKE 1 TABLET BY MOUTH EVERY DAY, Disp: 90 tablet, Rfl: 0    Multiple Vitamins-Minerals (CENTRUM SILVER 50+WOMEN PO), Take  by mouth Daily., Disp: , Rfl:     Multiple Vitamins-Minerals (EMERGEN-C IMMUNE PLUS PO), Take 1 capsule by mouth Daily., Disp: , Rfl:     pantoprazole (PROTONIX) 40 MG EC tablet, Take 1 tablet by mouth 2 (Two) Times a Day., Disp: 180 tablet, Rfl: 3    Repatha SureClick solution auto-injector SureClick injection, ADMINISTER 1 ML UNDER THE SKIN EVERY 14 DAYS AS DIRECTED, Disp: 2 mL, Rfl: 2    triamcinolone (KENALOG) 0.1 % cream, Apply  topically to the appropriate area as directed 3 (Three) Times a Day., Disp: 28.4 g, Rfl: 0     Objective     History of Present Illness  She complains of left leg pain and left hip pain.    She has had physical therapy for this particular complaint to which she was referred by orthopedic surgery.  She would like to visit this physical therapist again \"Brianna\" at [facility].  Leg Pain     Hip Pain          Review of Systems   Constitutional: Negative.    HENT: Negative.     Respiratory: Negative.     Cardiovascular: Negative.    Musculoskeletal:         She complains of left hip pain and some pain in the upper lateral left thigh.   Psychiatric/Behavioral: Negative.         Physical Exam  Vitals and nursing note reviewed.   Constitutional:       Appearance: Normal appearance.      Comments: Pleasant, neatly groomed, no distress.   Cardiovascular:      Rate and Rhythm: Regular rhythm.   Musculoskeletal:      Comments: She has some pain on palpation of her left greater " trochanter.   Neurological:      Mental Status: She is alert and oriented to person, place, and time.   Psychiatric:         Mood and Affect: Mood normal.         Behavior: Behavior normal.         Thought Content: Thought content normal.           Problems Addressed this Visit    None  Visit Diagnoses       Left hip pain    -  Primary    Relevant Orders    Ambulatory Referral to Physical Therapy Evaluate and treat          Diagnoses         Codes Comments    Left hip pain    -  Primary ICD-10-CM: M25.552  ICD-9-CM: 719.45               PLAN  She has left hip pain.  She has had a benefit with physical therapy in the past and I agree that it is probably the way to go for the time being.  I put the referral in for her.    If this fails to offer any significant improvement in pain for her then I would refer her back to orthopedic surgery.  No follow-ups on file.

## 2024-04-22 RX ORDER — DICYCLOMINE HYDROCHLORIDE 10 MG/1
CAPSULE ORAL
Qty: 360 CAPSULE | Refills: 1 | Status: SHIPPED | OUTPATIENT
Start: 2024-04-22

## 2024-04-22 RX ORDER — LISINOPRIL AND HYDROCHLOROTHIAZIDE 20; 12.5 MG/1; MG/1
TABLET ORAL
Qty: 90 TABLET | Refills: 1 | Status: SHIPPED | OUTPATIENT
Start: 2024-04-22

## 2024-05-07 ENCOUNTER — TREATMENT (OUTPATIENT)
Dept: PHYSICAL THERAPY | Facility: CLINIC | Age: 69
End: 2024-05-07
Payer: MEDICARE

## 2024-05-07 DIAGNOSIS — M25.552 ACUTE PAIN OF LEFT HIP: ICD-10-CM

## 2024-05-07 DIAGNOSIS — M70.62 GREATER TROCHANTERIC BURSITIS OF LEFT HIP: Primary | ICD-10-CM

## 2024-05-07 DIAGNOSIS — R52 PAIN AGGRAVATED BY WALKING: ICD-10-CM

## 2024-05-07 PROCEDURE — 97161 PT EVAL LOW COMPLEX 20 MIN: CPT | Performed by: PHYSICAL THERAPIST

## 2024-05-07 PROCEDURE — 97035 APP MDLTY 1+ULTRASOUND EA 15: CPT | Performed by: PHYSICAL THERAPIST

## 2024-05-10 ENCOUNTER — TREATMENT (OUTPATIENT)
Dept: PHYSICAL THERAPY | Facility: CLINIC | Age: 69
End: 2024-05-10
Payer: MEDICARE

## 2024-05-10 DIAGNOSIS — R52 PAIN AGGRAVATED BY WALKING: ICD-10-CM

## 2024-05-10 DIAGNOSIS — M70.62 GREATER TROCHANTERIC BURSITIS OF LEFT HIP: Primary | ICD-10-CM

## 2024-05-10 DIAGNOSIS — M25.552 ACUTE PAIN OF LEFT HIP: ICD-10-CM

## 2024-05-10 PROCEDURE — 97035 APP MDLTY 1+ULTRASOUND EA 15: CPT | Performed by: PHYSICAL THERAPIST

## 2024-05-10 PROCEDURE — 97140 MANUAL THERAPY 1/> REGIONS: CPT | Performed by: PHYSICAL THERAPIST

## 2024-05-14 ENCOUNTER — TREATMENT (OUTPATIENT)
Dept: PHYSICAL THERAPY | Facility: CLINIC | Age: 69
End: 2024-05-14
Payer: MEDICARE

## 2024-05-14 DIAGNOSIS — M25.552 ACUTE PAIN OF LEFT HIP: ICD-10-CM

## 2024-05-14 DIAGNOSIS — R52 PAIN AGGRAVATED BY WALKING: ICD-10-CM

## 2024-05-14 DIAGNOSIS — M70.62 GREATER TROCHANTERIC BURSITIS OF LEFT HIP: Primary | ICD-10-CM

## 2024-05-14 PROCEDURE — 97035 APP MDLTY 1+ULTRASOUND EA 15: CPT | Performed by: PHYSICAL THERAPIST

## 2024-05-14 PROCEDURE — 97140 MANUAL THERAPY 1/> REGIONS: CPT | Performed by: PHYSICAL THERAPIST

## 2024-05-14 NOTE — PROGRESS NOTES
Physical Therapy Daily Treatment Note    [facility]  [address] [place] [address]  [phone] (phone)  [phone] (fax)    Patient: Candy Garcia   : 1955  Diagnosis/ICD-10 Code:  Greater trochanteric bursitis of left hip [M70.62]  Referring practitioner: Maulik Lagos MD  Today's Date: 2024  Patient seen for 3 sessions    Visit Diagnoses:    ICD-10-CM ICD-9-CM   1. Greater trochanteric bursitis of left hip  M70.62 726.5   2. Acute pain of left hip  M25.552 719.45   3. Pain aggravated by walking  R52 780.96              Subjective Candy Chung reports that her hip pain is a little better,     Objective   See Exercise, Manual, and Modality Logs for complete treatment.       Assessment/Plan    Candy Chung has better tolerance for soft tissue mobilization of the tight tissues of the lateral hip and thigh so that it feels better to work through them.       Timed:    Manual Therapy:    30     mins  85132;  Therapeutic Exercise:    0     mins  47938;     Neuromuscular aSlvador:    0    mins  62767;    Therapeutic Activity:     0     mins  00133;     Gait Trainin     mins  22667;     Ultrasound:     10     mins  11125;    Aquatic Therapy    0     mins 09129;  Self Care                       0     mins   77103        Untimed:  Electrical Stimulation:    0     mins  93122 ( );  Traction:    0     mins  01581;   Dry Needling  (1-2 muscles)            0     mins  (Self-pay)  Dry Needling (3-4 muscles) 0      (Self-pay)  Dry Needling Trial    0     DRYNDLTRIAL  (No Charge)    Timed Treatment:   40   mins   Total Treatment:     40   mins    Vickie Hooper, PT  Physical Therapist    [place] License:[id number]

## 2024-05-16 ENCOUNTER — APPOINTMENT (OUTPATIENT)
Dept: WOMENS IMAGING | Facility: HOSPITAL | Age: 69
End: 2024-05-16
Payer: MEDICARE

## 2024-05-16 PROCEDURE — 77067 SCR MAMMO BI INCL CAD: CPT | Performed by: RADIOLOGY

## 2024-05-16 PROCEDURE — 77063 BREAST TOMOSYNTHESIS BI: CPT | Performed by: RADIOLOGY

## 2024-05-20 RX ORDER — DULOXETIN HYDROCHLORIDE 60 MG/1
CAPSULE, DELAYED RELEASE ORAL
Qty: 90 CAPSULE | Refills: 1 | Status: SHIPPED | OUTPATIENT
Start: 2024-05-20

## 2024-05-21 ENCOUNTER — TREATMENT (OUTPATIENT)
Dept: PHYSICAL THERAPY | Facility: CLINIC | Age: 69
End: 2024-05-21
Payer: MEDICARE

## 2024-05-21 DIAGNOSIS — R52 PAIN AGGRAVATED BY WALKING: ICD-10-CM

## 2024-05-21 DIAGNOSIS — M25.552 ACUTE PAIN OF LEFT HIP: ICD-10-CM

## 2024-05-21 DIAGNOSIS — M70.62 GREATER TROCHANTERIC BURSITIS OF LEFT HIP: Primary | ICD-10-CM

## 2024-05-21 PROCEDURE — 97035 APP MDLTY 1+ULTRASOUND EA 15: CPT | Performed by: PHYSICAL THERAPIST

## 2024-05-21 PROCEDURE — 97140 MANUAL THERAPY 1/> REGIONS: CPT | Performed by: PHYSICAL THERAPIST

## 2024-05-22 NOTE — PROGRESS NOTES
Physical Therapy Daily Treatment Note    [facility] Therapy [facility]  [address] [place] [address]  [phone] (phone)  [phone] (fax)    Patient: Candy Garcia   : 1955  Diagnosis/ICD-10 Code:  Greater trochanteric bursitis of left hip [M70.62]  Referring practitioner: Maulik Lagos MD  Today's Date: 2024  Patient seen for 4 sessions    Visit Diagnoses:    ICD-10-CM ICD-9-CM   1. Greater trochanteric bursitis of left hip  M70.62 726.5   2. Acute pain of left hip  M25.552 719.45   3. Pain aggravated by walking  R52 780.96              Subjective Candy Chung reports that she was able to walk lap of the mall with reduced hip pain. She reports she has been performing hip stretching and added the clam exercise.    Objective   See Exercise, Manual, and Modality Logs for complete treatment.       Assessment/Plan    Left hip pain is diminishing with activity.  There is still moderate tenderness at the greater trochanteric bursa to light palpation.       Timed:    Manual Therapy:    32     mins  32265;  Therapeutic Exercise:    0     mins  71115;     Neuromuscular Salvador:    0    mins  87591;    Therapeutic Activity:     0     mins  45441;     Gait Trainin     mins  86188;     Ultrasound:     10     mins  19282;    Aquatic Therapy    0     mins 92482;  Self Care                       0     mins   57728        Untimed:  Electrical Stimulation:    0     mins  79063 ( );  Traction:    0     mins  35882;   Dry Needling  (1-2 muscles)            0     mins  (Self-pay)  Dry Needling (3-4 muscles) 0      (Self-pay)  Dry Needling Trial    0     DRYNDLTRIAL  (No Charge)    Timed Treatment:   42   mins   Total Treatment:     42   mins    Vickie Hooper, PT  Physical Therapist    [place] License:[id number]

## 2024-05-24 ENCOUNTER — TREATMENT (OUTPATIENT)
Dept: PHYSICAL THERAPY | Facility: CLINIC | Age: 69
End: 2024-05-24
Payer: MEDICARE

## 2024-05-24 DIAGNOSIS — M25.552 ACUTE PAIN OF LEFT HIP: ICD-10-CM

## 2024-05-24 DIAGNOSIS — M70.62 GREATER TROCHANTERIC BURSITIS OF LEFT HIP: Primary | ICD-10-CM

## 2024-05-24 DIAGNOSIS — R52 PAIN AGGRAVATED BY WALKING: ICD-10-CM

## 2024-05-24 PROCEDURE — 97035 APP MDLTY 1+ULTRASOUND EA 15: CPT | Performed by: PHYSICAL THERAPIST

## 2024-05-24 PROCEDURE — 97140 MANUAL THERAPY 1/> REGIONS: CPT | Performed by: PHYSICAL THERAPIST

## 2024-05-24 NOTE — PROGRESS NOTES
Physical Therapy Daily Treatment Note    [facility] Physical Therapy [facility]  [address] [place] [address]  [phone] (phone)  [phone] (fax)    Patient: Candy Garcia   : 1955  Diagnosis/ICD-10 Code:  Greater trochanteric bursitis of left hip [M70.62]  Referring practitioner: Maulik Lagos MD  Today's Date: 2024  Patient seen for 5 sessions    Visit Diagnoses:    ICD-10-CM ICD-9-CM   1. Greater trochanteric bursitis of left hip  M70.62 726.5   2. Acute pain of left hip  M25.552 719.45   3. Pain aggravated by walking  R52 780.96              Subjective Candy Chung reports reduced hip pain and tenderness.  She feels that she needs to be stronger climbing stairs.    Objective   See Exercise, Manual, and Modality Logs for complete treatment.   Issued red band for side stepping and monster walks.    Assessment/Plan    Reduced tenderness over hip bursa and soft tissues.   Good strength in side lying positions of hip abductors allow for progression to standing resistive walking.       Timed:    Manual Therapy:    25     mins  70046;  Therapeutic Exercise:    5     mins  20691;     Neuromuscular Salvador:    0    mins  59983;    Therapeutic Activity:     0     mins  90332;     Gait Trainin     mins  07791;     Ultrasound:     10     mins  91689;    Aquatic Therapy    0     mins 42381;  Self Care                       0     mins   59802        Untimed:  Electrical Stimulation:    0     mins  37347 ( );  Traction:    0     mins  24170;   Dry Needling  (1-2 muscles)            0     mins  (Self-pay)  Dry Needling (3-4 muscles) 0      (Self-pay)  Dry Needling Trial    0     DRYNDLTRIAL  (No Charge)    Timed Treatment:   40   mins   Total Treatment:     40   mins    Vickie Hooper, PT  Physical Therapist    [place] License:[id number]

## 2024-05-29 ENCOUNTER — TREATMENT (OUTPATIENT)
Dept: PHYSICAL THERAPY | Facility: CLINIC | Age: 69
End: 2024-05-29
Payer: MEDICARE

## 2024-05-29 DIAGNOSIS — M25.552 ACUTE PAIN OF LEFT HIP: ICD-10-CM

## 2024-05-29 DIAGNOSIS — R52 PAIN AGGRAVATED BY WALKING: ICD-10-CM

## 2024-05-29 DIAGNOSIS — M70.62 GREATER TROCHANTERIC BURSITIS OF LEFT HIP: Primary | ICD-10-CM

## 2024-05-29 PROCEDURE — 97140 MANUAL THERAPY 1/> REGIONS: CPT | Performed by: PHYSICAL THERAPIST

## 2024-05-29 PROCEDURE — 97035 APP MDLTY 1+ULTRASOUND EA 15: CPT | Performed by: PHYSICAL THERAPIST

## 2024-05-29 NOTE — PROGRESS NOTES
Physical Therapy Daily Treatment Note    [facility]  [address] [place] [address]  [phone] (phone)  [phone] (fax)    Patient: Candy Garcia   : 1955  Diagnosis/ICD-10 Code:  Greater trochanteric bursitis of left hip [M70.62]  Referring practitioner: Maulik Lagos MD  Today's Date: 2024  Patient seen for 6 sessions    Visit Diagnoses:    ICD-10-CM ICD-9-CM   1. Greater trochanteric bursitis of left hip  M70.62 726.5   2. Acute pain of left hip  M25.552 719.45   3. Pain aggravated by walking  R52 780.96              Subjective Candy Chung reports that she was sore at the front of her left hip later after she did exercises.  She guesses it was from the new [place] walks so she stopped them and the hip is feeling better.    Objective   See Exercise, Manual, and Modality Logs for complete treatment.   Restart monster walk without the band with only 10 reps.    Assessment/Plan    Candy Chung still has tenderness along the left lateral hip over the trochanteric bursa and ITB.  She did not get off to a good start with exercise progression so will start slower.        Timed:    Manual Therapy:    32     mins  76304;  Therapeutic Exercise:    0     mins  33782;     Neuromuscular Salvador:    0    mins  38553;    Therapeutic Activity:     0     mins  85431;     Gait Trainin     mins  08434;     Ultrasound:     10     mins  66610;    Aquatic Therapy    0     mins 93074;  Self Care                       0     mins   39938        Untimed:  Electrical Stimulation:    0     mins  06893 ( );  Traction:    0     mins  56846;   Dry Needling  (1-2 muscles)            0     mins 65733 (Self-pay)  Dry Needling (3-4 muscles) 0      (Self-pay)  Dry Needling Trial    0     DRYNDLTRIAL  (No Charge)    Timed Treatment:   42   mins   Total Treatment:     42   mins    Vickie Hooper, PT  Physical Therapist    [place] License:[id number]

## 2024-05-31 ENCOUNTER — TREATMENT (OUTPATIENT)
Dept: PHYSICAL THERAPY | Facility: CLINIC | Age: 69
End: 2024-05-31
Payer: MEDICARE

## 2024-05-31 DIAGNOSIS — R52 PAIN AGGRAVATED BY WALKING: ICD-10-CM

## 2024-05-31 DIAGNOSIS — M25.552 ACUTE PAIN OF LEFT HIP: ICD-10-CM

## 2024-05-31 DIAGNOSIS — M70.62 GREATER TROCHANTERIC BURSITIS OF LEFT HIP: Primary | ICD-10-CM

## 2024-05-31 PROCEDURE — 97035 APP MDLTY 1+ULTRASOUND EA 15: CPT | Performed by: PHYSICAL THERAPIST

## 2024-05-31 PROCEDURE — 97140 MANUAL THERAPY 1/> REGIONS: CPT | Performed by: PHYSICAL THERAPIST

## 2024-05-31 NOTE — PROGRESS NOTES
Physical Therapy Daily Treatment Note    [facility] Physical Therapy [facility]  [address] [place] [address]  [phone] (phone)  [phone] (fax)    Patient: Candy Garcia   : 1955  Diagnosis/ICD-10 Code:  Greater trochanteric bursitis of left hip [M70.62]  Referring practitioner: Maulik Lagos MD  Today's Date: 2024  Patient seen for 7 sessions    Visit Diagnoses:    ICD-10-CM ICD-9-CM   1. Greater trochanteric bursitis of left hip  M70.62 726.5   2. Acute pain of left hip  M25.552 719.45   3. Pain aggravated by walking  R52 780.96              Subjective Candy hCung reports that she has been walking mostly and that has been feeling better.  She also reports that her left hip feels more stable when she goes up the steps than it did.     Objective   See Exercise, Manual, and Modality Logs for complete treatment.       Assessment/Plan    There is reduced tenderness to the left hip region.  The left hip is feeling stronger and more stable going up stairs.       Timed:    Manual Therapy:    32     mins  10098;  Therapeutic Exercise:    0     mins  13649;     Neuromuscular Salvador:    0    mins  13410;    Therapeutic Activity:     0     mins  13743;     Gait Trainin     mins  62016;     Ultrasound:     10     mins  59661;    Aquatic Therapy    0     mins 56742;  Self Care                       0     mins   94237        Untimed:  Electrical Stimulation:    0     mins  84465 ( );  Traction:    0     mins  04490;   Dry Needling  (1-2 muscles)            0     mins  (Self-pay)  Dry Needling (3-4 muscles) 0      (Self-pay)  Dry Needling Trial    0     DRYNDLTRIAL  (No Charge)    Timed Treatment:   42   mins   Total Treatment:     42   mins    Vickie Hooper, PT  Physical Therapist    [place] License:[id number]

## 2024-06-03 ENCOUNTER — TREATMENT (OUTPATIENT)
Dept: PHYSICAL THERAPY | Facility: CLINIC | Age: 69
End: 2024-06-03
Payer: MEDICARE

## 2024-06-03 DIAGNOSIS — M25.552 ACUTE PAIN OF LEFT HIP: ICD-10-CM

## 2024-06-03 DIAGNOSIS — M70.62 GREATER TROCHANTERIC BURSITIS OF LEFT HIP: Primary | ICD-10-CM

## 2024-06-03 DIAGNOSIS — R52 PAIN AGGRAVATED BY WALKING: ICD-10-CM

## 2024-06-03 PROCEDURE — 97140 MANUAL THERAPY 1/> REGIONS: CPT | Performed by: PHYSICAL THERAPIST

## 2024-06-03 PROCEDURE — 97035 APP MDLTY 1+ULTRASOUND EA 15: CPT | Performed by: PHYSICAL THERAPIST

## 2024-06-03 NOTE — PROGRESS NOTES
Physical Therapy Daily Treatment Note    [facility] Physical Therapy [facility]  [address] [place] [address]  [phone] (phone)  [phone] (fax)    Patient: Candy Garcia   : 1955  Diagnosis/ICD-10 Code:  Greater trochanteric bursitis of left hip [M70.62]  Referring practitioner: Maulik Lagos MD  Today's Date: 6/3/2024  Patient seen for 8 sessions    Visit Diagnoses:    ICD-10-CM ICD-9-CM   1. Greater trochanteric bursitis of left hip  M70.62 726.5   2. Acute pain of left hip  M25.552 719.45   3. Pain aggravated by walking  R52 780.96              Subjective Candy Chung reports that she came up the stairs well today and she feels her hip is close to baseline.    Objective   See Exercise, Manual, and Modality Logs for complete treatment.       Assessment/Plan    Candy Chung has reduced tenderness to her left hip.  She is doing better with her functional mobility and stair climbing.  She will be more active in next week with her grandson so feel like her condition will be better assessed for lasting improvement after that activity.        Timed:    Manual Therapy:    30     mins  93309;  Therapeutic Exercise:    0     mins  29603;     Neuromuscular Salvador:    0    mins  87227;    Therapeutic Activity:     0     mins  06376;     Gait Trainin     mins  38029;     Ultrasound:     10     mins  10255;    Aquatic Therapy    0     mins 70902;  Self Care                       0     mins   05287        Untimed:  Electrical Stimulation:    0     mins  80885 ( );  Traction:    0     mins  99605;   Dry Needling  (1-2 muscles)            0     mins  (Self-pay)  Dry Needling (3-4 muscles) 0      (Self-pay)  Dry Needling Trial    0     DRYNDLTRIAL  (No Charge)    Timed Treatment:   40   mins   Total Treatment:     40   mins    Vickie Hooper, PT  Physical Therapist    [place] License:[id number]

## 2024-06-18 RX ORDER — EVOLOCUMAB 140 MG/ML
INJECTION, SOLUTION SUBCUTANEOUS
Qty: 2 ML | Refills: 2 | Status: SHIPPED | OUTPATIENT
Start: 2024-06-18

## 2024-06-19 ENCOUNTER — TREATMENT (OUTPATIENT)
Dept: PHYSICAL THERAPY | Facility: CLINIC | Age: 69
End: 2024-06-19
Payer: MEDICARE

## 2024-06-19 DIAGNOSIS — R52 PAIN AGGRAVATED BY WALKING: ICD-10-CM

## 2024-06-19 DIAGNOSIS — M70.62 GREATER TROCHANTERIC BURSITIS OF LEFT HIP: Primary | ICD-10-CM

## 2024-06-19 DIAGNOSIS — M25.552 ACUTE PAIN OF LEFT HIP: ICD-10-CM

## 2024-06-20 NOTE — PROGRESS NOTES
Physical Therapy Daily Treatment and Discharge Note    [facility] Physical Therapy [facility]  [address] [place] [address]  [phone] (phone)  [phone] (fax)    Patient: Candy Garcia   : 1955  Diagnosis/ICD-10 Code:  Greater trochanteric bursitis of left hip [M70.62]  Referring practitioner: Maulik Lagos MD  Today's Date: 2024  Patient seen for 9 sessions    Visit Diagnoses:    ICD-10-CM ICD-9-CM   1. Greater trochanteric bursitis of left hip  M70.62 726.5   2. Acute pain of left hip  M25.552 719.45   3. Pain aggravated by walking  R52 780.96              Subjective Candy Chung reports that she is able to go up stairs without increased hip pain and she feels ready to return to exercise walking.    Objective          Palpation     Additional Palpation Details  Tenderness and tightness along the left ITB    Tenderness     Left Hip   Tenderness in the greater trochanter (Milder).     Ambulation   Weight-Bearing Status   Assistive device used: none    Observational Gait   Gait: within functional limits       See Exercise, Manual, and Modality Logs for complete treatment.       Assessment & Plan       Assessment  Assessment details: Candy Garcia is a 68 y.o. year-old female referred to physical therapy for left hip bursitis pain. She has been seen for 9 sessions for ultrasound and manual therapy.  She reports pain has decreased to baseline and allows her to negotiate stairs and walk without increased pain.  She is following HEP and plans to return to walking for exercise.    Goals  Plan Goals: PT Goal Recert Due Date: 24  STG Date to Achieve: 24    STG 1.  Patient will perform regular home exercises for hip stretching without hip pain  Met  STG 2  Patient will report decreased left hip pain from 5-9/10 to 1-5/10   Met  STG 3.Patient will be able to walk 1 mile with minimal to no increase in hip pain above 5/10  Met    STG 4 Patient will be able to sit longer before  hip bothers her to tolerate longer car rides   Met    LTG Date to Achieve: 24    LTG 1. Patient will have adequate hip strength and agility to get up and down from floor with grandchild  Met    LTG 2 Patient will be able to go up and down stairs reciprocally with light support on rail only   Met  LTG 3 Patient will have minimal tenderness of the left hip to allow her to sleep on the left side longer   Met  LTG 3: Patient will report decreased functional disability on LEFS score from 65 % to 45 % or less   Met no disability reported for her regular activities                   Timed:    Manual Therapy:    30     mins  57797;  Therapeutic Exercise:    0     mins  63514;     Neuromuscular Salvador:    0    mins  02549;    Therapeutic Activity:     0     mins  69954;     Gait Trainin     mins  30959;     Ultrasound:     10     mins  08039;    Aquatic Therapy    0     mins 94549;  Self Care                       0     mins   30453        Untimed:  Electrical Stimulation:    0     mins  77858 ( );  Traction:    0     mins  94446;   Dry Needling  (1-2 muscles)            0     mins  (Self-pay)  Dry Needling (3-4 muscles) 0      (Self-pay)  Dry Needling Trial    0     DRYNDLTRIAL  (No Charge)    Timed Treatment:   40   mins   Total Treatment:     40   mins    Vickie Hooper PT  Physical Therapist    [place] License:[id number]

## 2024-07-31 RX ORDER — PANTOPRAZOLE SODIUM 40 MG/1
40 TABLET, DELAYED RELEASE ORAL 2 TIMES DAILY
Qty: 180 TABLET | Refills: 3 | Status: SHIPPED | OUTPATIENT
Start: 2024-07-31

## 2024-08-07 ENCOUNTER — OFFICE VISIT (OUTPATIENT)
Dept: FAMILY MEDICINE CLINIC | Facility: CLINIC | Age: 69
End: 2024-08-07
Payer: MEDICARE

## 2024-08-07 VITALS
RESPIRATION RATE: 16 BRPM | OXYGEN SATURATION: 99 % | SYSTOLIC BLOOD PRESSURE: 118 MMHG | HEART RATE: 79 BPM | DIASTOLIC BLOOD PRESSURE: 76 MMHG | WEIGHT: 190 LBS | HEIGHT: 64 IN | BODY MASS INDEX: 32.44 KG/M2

## 2024-08-07 DIAGNOSIS — I10 ESSENTIAL HYPERTENSION: Primary | ICD-10-CM

## 2024-08-07 DIAGNOSIS — R74.8 ELEVATED ALKALINE PHOSPHATASE LEVEL: ICD-10-CM

## 2024-08-07 DIAGNOSIS — E03.9 HYPOTHYROIDISM, UNSPECIFIED TYPE: ICD-10-CM

## 2024-08-07 DIAGNOSIS — E78.5 HYPERLIPIDEMIA LDL GOAL <70: ICD-10-CM

## 2024-08-07 PROCEDURE — 3074F SYST BP LT 130 MM HG: CPT | Performed by: INTERNAL MEDICINE

## 2024-08-07 PROCEDURE — 1126F AMNT PAIN NOTED NONE PRSNT: CPT | Performed by: INTERNAL MEDICINE

## 2024-08-07 PROCEDURE — 99213 OFFICE O/P EST LOW 20 MIN: CPT | Performed by: INTERNAL MEDICINE

## 2024-08-07 PROCEDURE — 3078F DIAST BP <80 MM HG: CPT | Performed by: INTERNAL MEDICINE

## 2024-08-07 NOTE — PROGRESS NOTES
Subjective   Candy Garcia is a 69 y.o. female. Patient is here today for   Chief Complaint   Patient presents with    Follow-up    Hypertension        History of Present Illness  History of Present Illness  The patient presents for routine follow-up.    She reports feeling well overall. Her last visit was in 04/2024.     She attended physical therapy sessions at [facility] for her hip, which she found beneficial for her left hip pain.      She attributes her inflammation to frequent stair use with her grandson, which led to inflammation in her hip. However, an ultrasound and massage have alleviated the inflammation, and she has resumed walking.     She had lost weight, but has since regained it.     She self-administers Repatha injections.     She is currently taking levothyroxine 100 mcg.    SOCIAL HISTORY  The patient denies smoking.      Vitals:    08/07/24 0958   BP: 118/76   Pulse: 79   Resp: 16   SpO2: 99%     Body mass index is 32.6 kg/m².    Past Medical History:   Diagnosis Date    Acute pansinusitis     Allergic     Anemia     Arthritis 08/07/2018    Asthma     Depression     Disease of thyroid gland     GERD (gastroesophageal reflux disease)     Head trauma 03/06/2023    pt fell out of attic on head moving furniture    Hip pain, acute, left     Hypercholesterolemia     Hyperlipidemia     Hypertension     Hypothyroidism     Irritable bowel syndrome 01/19/2016    Obesity     overweight    PONV (postoperative nausea and vomiting)     Raynaud's phenomenon     Right-sided lacunar infarction 12/01/2016    Stroke     Tremor 01/19/2016      Allergies   Allergen Reactions    Penicillins Anaphylaxis     Occurred when pt was 16 years old    Atorvastatin Myalgia    Rosuvastatin Myalgia      Social History     Socioeconomic History    Marital status:    Tobacco Use    Smoking status: Never    Smokeless tobacco: Never   Vaping Use    Vaping status: Never Used   Substance and Sexual Activity    Alcohol use: Yes      Alcohol/week: 2.0 standard drinks of alcohol     Types: 2 Glasses of wine per week     Comment: daily caffiene    Drug use: No    Sexual activity: Not Currently     Partners: Male     Birth control/protection: Post-menopausal        Current Outpatient Medications:     acetaminophen (TYLENOL) 325 MG tablet, TAKE 2 TABLETS BY MOUTH FOUR TIMES DAILY AS NEEDED FOR PAIN, Disp: , Rfl:     aspirin  MG tablet, TAKE ONE TABLET BY MOUTH DAILY, Disp: 90 tablet, Rfl: 1    cetirizine (zyrTEC) 5 MG tablet, Take 1 tablet by mouth Daily., Disp: , Rfl:     dicyclomine (BENTYL) 10 MG capsule, TAKE 1 CAPSULE BY MOUTH FOUR TIMES DAILY BEFORE MEALS AND AT BEDTIME, Disp: 360 capsule, Rfl: 1    DULoxetine (CYMBALTA) 60 MG capsule, TAKE 1 CAPSULE BY MOUTH EVERY DAY, Disp: 90 capsule, Rfl: 1    ferrous sulfate 325 (65 FE) MG tablet, Take 1 tablet by mouth Daily With Breakfast., Disp: , Rfl:     levothyroxine (SYNTHROID, LEVOTHROID) 100 MCG tablet, TAKE 1 TABLET BY MOUTH DAILY, Disp: 90 tablet, Rfl: 3    lisinopril-hydrochlorothiazide (PRINZIDE,ZESTORETIC) 20-12.5 MG per tablet, TAKE 1 TABLET BY MOUTH EVERY DAY, Disp: 90 tablet, Rfl: 1    Multiple Vitamins-Minerals (CENTRUM SILVER 50+WOMEN PO), Take  by mouth Daily., Disp: , Rfl:     Multiple Vitamins-Minerals (EMERGEN-C IMMUNE PLUS PO), Take 1 capsule by mouth Daily., Disp: , Rfl:     pantoprazole (PROTONIX) 40 MG EC tablet, TAKE 1 TABLET BY MOUTH TWICE DAILY, Disp: 180 tablet, Rfl: 3    Repatha SureClick solution auto-injector SureClick injection, ADMINISTER 1 ML UNDER THE SKIN EVERY 14 DAYS AS DIRECTED, Disp: 2 mL, Rfl: 2    triamcinolone (KENALOG) 0.1 % cream, Apply  topically to the appropriate area as directed 3 (Three) Times a Day., Disp: 28.4 g, Rfl: 0     Objective     Review of Systems   Constitutional: Negative.    HENT: Negative.     Respiratory: Negative.     Cardiovascular: Negative.    Psychiatric/Behavioral: Negative.         Physical Exam  Vitals and nursing note  reviewed.   Constitutional:       General: She is not in acute distress.     Appearance: Normal appearance. She is obese. She is not ill-appearing, toxic-appearing or diaphoretic.      Comments: Pleasant, neatly groomed, no distress.  BMI 32.   Cardiovascular:      Rate and Rhythm: Regular rhythm.      Heart sounds: Normal heart sounds. No murmur heard.  Neurological:      Mental Status: She is alert and oriented to person, place, and time.   Psychiatric:         Mood and Affect: Mood normal.         Behavior: Behavior normal.         Thought Content: Thought content normal.       Physical Exam  No carotid bruits in the neck.  Lungs are normal.  Heart sounds are normal. No murmurs.    Results  Laboratory Studies  Liver enzymes slightly elevated. Alkaline phosphatase slightly elevated. Cholesterol levels are good. Thyroid stimulating hormone is a little low. Last TSH was 0.459. Thyroid hormone level is in the normal range.    Assessment & Plan    Problems Addressed this Visit          Cardiac and Vasculature    Essential hypertension - Primary    Hyperlipidemia LDL goal <70       Endocrine and Metabolic    Hypothyroidism       Symptoms and Signs    Elevated alkaline phosphatase level     Diagnoses         Codes Comments    Essential hypertension    -  Primary ICD-10-CM: I10  ICD-9-CM: 401.9     Hyperlipidemia LDL goal <70     ICD-10-CM: E78.5  ICD-9-CM: 272.4     Elevated alkaline phosphatase level     ICD-10-CM: R74.8  ICD-9-CM: 790.5     Hypothyroidism, unspecified type     ICD-10-CM: E03.9  ICD-9-CM: 244.9           Assessment & Plan  1. Routine follow-up.  Liver enzymes and alkaline phosphatase levels are slightly elevated, likely due to her liver condition.     However, her cholesterol levels are optimal.     She has some TSH variability secondary to generic thyroid hormone intake.  Her hypothyroidism is appropriately replaced.      Her blood pressure and cholesterol levels are within normal limits.    She is  obese with a BMI of 32.  She was advised to lose weight and engage in regular exercise per American Heart Association guidelines.     Her levothyroxine dosage will be maintained.    Follow-up  A follow-up visit is scheduled.     I had hoped to make this visit a bit sooner to catch her up-to-date with her Medicare wellness visit she preferred 6 months down the road.      No follow-ups on file.    Patient or patient representative verbalized consent for the use of Ambient Listening during the visit with  Maulik Lagos MD for chart documentation. 8/8/2024  08:53 EDT

## 2024-08-09 RX ORDER — LEVOTHYROXINE SODIUM 0.1 MG/1
100 TABLET ORAL DAILY
Qty: 90 TABLET | Refills: 1 | Status: SHIPPED | OUTPATIENT
Start: 2024-08-09

## 2024-08-21 RX ORDER — DULOXETIN HYDROCHLORIDE 60 MG/1
CAPSULE, DELAYED RELEASE ORAL
Qty: 90 CAPSULE | Refills: 1 | Status: SHIPPED | OUTPATIENT
Start: 2024-08-21

## 2024-09-11 RX ORDER — EVOLOCUMAB 140 MG/ML
INJECTION, SOLUTION SUBCUTANEOUS
Qty: 2 ML | Refills: 2 | Status: SHIPPED | OUTPATIENT
Start: 2024-09-11

## 2024-10-16 ENCOUNTER — OFFICE VISIT (OUTPATIENT)
Dept: FAMILY MEDICINE CLINIC | Facility: CLINIC | Age: 69
End: 2024-10-16
Payer: MEDICARE

## 2024-10-16 VITALS
SYSTOLIC BLOOD PRESSURE: 120 MMHG | DIASTOLIC BLOOD PRESSURE: 68 MMHG | RESPIRATION RATE: 16 BRPM | HEIGHT: 64 IN | HEART RATE: 83 BPM | TEMPERATURE: 98.3 F | OXYGEN SATURATION: 98 % | WEIGHT: 193 LBS | BODY MASS INDEX: 32.95 KG/M2

## 2024-10-16 DIAGNOSIS — R05.2 SUBACUTE COUGH: Primary | ICD-10-CM

## 2024-10-16 LAB
EXPIRATION DATE: NORMAL
EXPIRATION DATE: NORMAL
FLUAV AG NPH QL: NEGATIVE
FLUBV AG NPH QL: NEGATIVE
INTERNAL CONTROL: NORMAL
INTERNAL CONTROL: NORMAL
Lab: NORMAL
Lab: NORMAL
SARS-COV-2 AG UPPER RESP QL IA.RAPID: NOT DETECTED

## 2024-10-16 PROCEDURE — 3074F SYST BP LT 130 MM HG: CPT | Performed by: INTERNAL MEDICINE

## 2024-10-16 PROCEDURE — 87426 SARSCOV CORONAVIRUS AG IA: CPT | Performed by: INTERNAL MEDICINE

## 2024-10-16 PROCEDURE — 1125F AMNT PAIN NOTED PAIN PRSNT: CPT | Performed by: INTERNAL MEDICINE

## 2024-10-16 PROCEDURE — 3078F DIAST BP <80 MM HG: CPT | Performed by: INTERNAL MEDICINE

## 2024-10-16 PROCEDURE — 99213 OFFICE O/P EST LOW 20 MIN: CPT | Performed by: INTERNAL MEDICINE

## 2024-10-16 PROCEDURE — 87804 INFLUENZA ASSAY W/OPTIC: CPT | Performed by: INTERNAL MEDICINE

## 2024-10-16 RX ORDER — HYDROCODONE BITARTRATE AND HOMATROPINE METHYLBROMIDE ORAL SOLUTION 5; 1.5 MG/5ML; MG/5ML
5 LIQUID ORAL EVERY 6 HOURS PRN
Qty: 120 ML | Refills: 0 | Status: SHIPPED | OUTPATIENT
Start: 2024-10-16

## 2024-10-16 NOTE — PROGRESS NOTES
"Chief Complaint  Cough, Nasal Congestion, and Hoarse    Subjective        Candy Garcia presents to [facility]  History of Present Illness  Patient is here today with c/o persistent severe coarse cough associated with chest discomfort, congestion & drainage. She has been coughing excessively for the past 7 days, usually dry but sometimes productive of small amount of non bloody phlegm. She reports hoarseness of voice 3 days ago due to excessive cough. She reports to have been exposed to her daughter and grandson who were sick with URI last week prior to onset of her symptoms. Has tried Mucin at home without much relief      Objective   Vital Signs:  /68   Pulse 83   Temp 98.3 °F (36.8 °C) (Oral)   Resp 16   Ht 162.6 cm (64\")   Wt 87.5 kg (193 lb)   SpO2 98%   BMI 33.13 kg/m²   Estimated body mass index is 33.13 kg/m² as calculated from the following:    Height as of this encounter: 162.6 cm (64\").    Weight as of this encounter: 87.5 kg (193 lb).            Physical Exam  Constitutional:       Appearance: Normal appearance.   HENT:      Head: Normocephalic and atraumatic.      Mouth/Throat:      Mouth: Mucous membranes are moist.      Pharynx: Oropharynx is clear. No oropharyngeal exudate or posterior oropharyngeal erythema.   Eyes:      Conjunctiva/sclera: Conjunctivae normal.   Cardiovascular:      Heart sounds: Normal heart sounds.   Pulmonary:      Effort: Pulmonary effort is normal. No respiratory distress.      Breath sounds: Normal breath sounds. No wheezing.   Neurological:      Mental Status: She is alert and oriented to person, place, and time.        Result Review :  The following data was reviewed by: Aishwarya Melgar MD on 10/16/2024:    POCT Covid and Flu test were negative         Assessment and Plan   Diagnoses and all orders for this visit:    1. Subacute cough (Primary)  -     HYDROcodone Bit-Homatrop MBr (HYCODAN) 5-1.5 MG/5ML solution; Take 5 mL by mouth " Every 6 (Six) Hours As Needed for Cough.  Dispense: 120 mL; Refill: 0  -     POCT SARS-CoV-2 Antigen DOUGLAS  -     POCT Influenza A/B             Follow Up   No follow-ups on file.  Patient was given instructions and counseling regarding her condition or for health maintenance advice. Please see specific information pulled into the AVS if appropriate.

## 2024-10-21 DIAGNOSIS — R05.2 SUBACUTE COUGH: ICD-10-CM

## 2024-10-21 RX ORDER — HYDROCODONE BITARTRATE AND HOMATROPINE METHYLBROMIDE ORAL SOLUTION 5; 1.5 MG/5ML; MG/5ML
5 LIQUID ORAL EVERY 6 HOURS PRN
Qty: 120 ML | Refills: 0 | OUTPATIENT
Start: 2024-10-21

## 2024-10-21 NOTE — TELEPHONE ENCOUNTER
"      Caller: Candy Garcia \"Candy Chung\"    Relationship: Self    Best call back number: [phone]     Requested Prescriptions:   Requested Prescriptions     Pending Prescriptions Disp Refills    HYDROcodone Bit-Homatrop MBr (HYCODAN) 5-1.5 MG/5ML solution 120 mL 0     Sig: Take 5 mL by mouth Every 6 (Six) Hours As Needed for Cough.        Pharmacy where request should be sent: [id number] [address] - [phone] PH - [phone] FX     Last office visit with prescribing clinician: 8/7/2024   Last telemedicine visit with prescribing clinician: Visit date not found   Next office visit with prescribing clinician: 2/28/2025     Additional details provided by patient: PATIENT STATED THAT SHE SPILLED SOME HER MEDICATION AND IS REQUESTING A REFILL. PATIENT ALSO STATED THAT SHE IS GOING OUT OF TOWN AND WOULD LIKE IT BEFORE SHE LEAVES. PATIENT STATED HER COUGH IS BETTER BUT HAS NOT GONE AWAY.    Does the patient have less than a 3 day supply:  [x] Yes  [] No    Would you like a call back once the refill request has been completed: [x] Yes [] No    If the office needs to give you a call back, can they leave a voicemail: [x] Yes [] No    Angel Rush Rep   10/21/24 15:37 EDT          "

## 2024-11-15 RX ORDER — TRIAMCINOLONE ACETONIDE 1 MG/G
CREAM TOPICAL
Qty: 30 G | Refills: 0 | Status: SHIPPED | OUTPATIENT
Start: 2024-11-15

## 2024-12-10 RX ORDER — EVOLOCUMAB 140 MG/ML
INJECTION, SOLUTION SUBCUTANEOUS
Qty: 2 ML | Refills: 2 | Status: SHIPPED | OUTPATIENT
Start: 2024-12-10

## 2025-01-17 RX ORDER — LISINOPRIL AND HYDROCHLOROTHIAZIDE 12.5; 2 MG/1; MG/1
TABLET ORAL
Qty: 90 TABLET | Refills: 1 | Status: SHIPPED | OUTPATIENT
Start: 2025-01-17

## 2025-02-09 RX ORDER — DULOXETIN HYDROCHLORIDE 60 MG/1
CAPSULE, DELAYED RELEASE ORAL
Qty: 90 CAPSULE | Refills: 1 | Status: SHIPPED | OUTPATIENT
Start: 2025-02-09

## 2025-03-04 RX ORDER — EVOLOCUMAB 140 MG/ML
INJECTION, SOLUTION SUBCUTANEOUS
Qty: 2 ML | Refills: 2 | Status: SHIPPED | OUTPATIENT
Start: 2025-03-04

## 2025-03-24 ENCOUNTER — TELEPHONE (OUTPATIENT)
Dept: FAMILY MEDICINE CLINIC | Facility: CLINIC | Age: 70
End: 2025-03-24

## 2025-03-24 NOTE — TELEPHONE ENCOUNTER
"    Hub staff attempted to follow warm transfer process and was unsuccessful     Caller: Candy Garcia \"Candy Chung\"    Relationship to patient: Self    Best call back number: [phone]     Patient is needing: PATIENT IS WANTING TO SEE IF THEY CAN COME IN EARLIER FOR 03/25 LAB APPOINTMENT        "

## 2025-03-25 ENCOUNTER — LAB (OUTPATIENT)
Dept: FAMILY MEDICINE CLINIC | Facility: CLINIC | Age: 70
End: 2025-03-25
Payer: MEDICARE

## 2025-03-25 DIAGNOSIS — E78.5 HYPERLIPIDEMIA LDL GOAL <70: Primary | ICD-10-CM

## 2025-03-25 DIAGNOSIS — E03.9 HYPOTHYROIDISM, UNSPECIFIED TYPE: ICD-10-CM

## 2025-03-26 LAB
ALBUMIN SERPL-MCNC: 4.2 G/DL (ref 3.5–5.2)
ALBUMIN/GLOB SERPL: 1.8 G/DL
ALP SERPL-CCNC: 145 U/L (ref 39–117)
ALT SERPL-CCNC: 33 U/L (ref 1–33)
AST SERPL-CCNC: 24 U/L (ref 1–32)
BASOPHILS # BLD AUTO: 0.04 10*3/MM3 (ref 0–0.2)
BASOPHILS NFR BLD AUTO: 0.8 % (ref 0–1.5)
BILIRUB SERPL-MCNC: 0.5 MG/DL (ref 0–1.2)
BUN SERPL-MCNC: 13 MG/DL (ref 8–23)
BUN/CREAT SERPL: 16.5 (ref 7–25)
CALCIUM SERPL-MCNC: 9.6 MG/DL (ref 8.6–10.5)
CHLORIDE SERPL-SCNC: 103 MMOL/L (ref 98–107)
CHOLEST SERPL-MCNC: 133 MG/DL (ref 0–200)
CO2 SERPL-SCNC: 29.7 MMOL/L (ref 22–29)
CREAT SERPL-MCNC: 0.79 MG/DL (ref 0.57–1)
EGFRCR SERPLBLD CKD-EPI 2021: 81.6 ML/MIN/1.73
EOSINOPHIL # BLD AUTO: 0.13 10*3/MM3 (ref 0–0.4)
EOSINOPHIL NFR BLD AUTO: 2.6 % (ref 0.3–6.2)
ERYTHROCYTE [DISTWIDTH] IN BLOOD BY AUTOMATED COUNT: 12.9 % (ref 12.3–15.4)
GLOBULIN SER CALC-MCNC: 2.3 GM/DL
GLUCOSE SERPL-MCNC: 92 MG/DL (ref 65–99)
HCT VFR BLD AUTO: 44.8 % (ref 34–46.6)
HDLC SERPL-MCNC: 69 MG/DL (ref 40–60)
HGB BLD-MCNC: 14.1 G/DL (ref 12–15.9)
IMM GRANULOCYTES # BLD AUTO: 0.01 10*3/MM3 (ref 0–0.05)
IMM GRANULOCYTES NFR BLD AUTO: 0.2 % (ref 0–0.5)
LDLC SERPL CALC-MCNC: 46 MG/DL (ref 0–100)
LYMPHOCYTES # BLD AUTO: 1.28 10*3/MM3 (ref 0.7–3.1)
LYMPHOCYTES NFR BLD AUTO: 25.9 % (ref 19.6–45.3)
MCH RBC QN AUTO: 28.5 PG (ref 26.6–33)
MCHC RBC AUTO-ENTMCNC: 31.5 G/DL (ref 31.5–35.7)
MCV RBC AUTO: 90.5 FL (ref 79–97)
MONOCYTES # BLD AUTO: 0.3 10*3/MM3 (ref 0.1–0.9)
MONOCYTES NFR BLD AUTO: 6.1 % (ref 5–12)
NEUTROPHILS # BLD AUTO: 3.18 10*3/MM3 (ref 1.7–7)
NEUTROPHILS NFR BLD AUTO: 64.4 % (ref 42.7–76)
NRBC BLD AUTO-RTO: 0 /100 WBC (ref 0–0.2)
PLATELET # BLD AUTO: 236 10*3/MM3 (ref 140–450)
POTASSIUM SERPL-SCNC: 4.9 MMOL/L (ref 3.5–5.2)
PROT SERPL-MCNC: 6.5 G/DL (ref 6–8.5)
RBC # BLD AUTO: 4.95 10*6/MM3 (ref 3.77–5.28)
SODIUM SERPL-SCNC: 142 MMOL/L (ref 136–145)
T4 FREE SERPL-MCNC: 1.85 NG/DL (ref 0.92–1.68)
TRIGL SERPL-MCNC: 100 MG/DL (ref 0–150)
TSH SERPL DL<=0.005 MIU/L-ACNC: 0.07 UIU/ML (ref 0.27–4.2)
UNABLE TO VOID: NORMAL
VLDLC SERPL CALC-MCNC: 18 MG/DL (ref 5–40)
WBC # BLD AUTO: 4.94 10*3/MM3 (ref 3.4–10.8)

## 2025-04-10 ENCOUNTER — OFFICE VISIT (OUTPATIENT)
Dept: FAMILY MEDICINE CLINIC | Facility: CLINIC | Age: 70
End: 2025-04-10
Payer: MEDICARE

## 2025-04-10 VITALS
HEART RATE: 76 BPM | OXYGEN SATURATION: 98 % | HEIGHT: 64 IN | DIASTOLIC BLOOD PRESSURE: 78 MMHG | BODY MASS INDEX: 33.26 KG/M2 | SYSTOLIC BLOOD PRESSURE: 118 MMHG | WEIGHT: 194.8 LBS | TEMPERATURE: 96.8 F | RESPIRATION RATE: 16 BRPM

## 2025-04-10 DIAGNOSIS — F41.8 DEPRESSION WITH ANXIETY: ICD-10-CM

## 2025-04-10 DIAGNOSIS — I10 ESSENTIAL HYPERTENSION: ICD-10-CM

## 2025-04-10 DIAGNOSIS — E03.9 HYPOTHYROIDISM, UNSPECIFIED TYPE: ICD-10-CM

## 2025-04-10 DIAGNOSIS — E78.5 HYPERLIPIDEMIA LDL GOAL <70: Primary | ICD-10-CM

## 2025-04-10 RX ORDER — LEVOTHYROXINE SODIUM 88 UG/1
88 TABLET ORAL
Qty: 90 TABLET | Refills: 3 | Status: SHIPPED | OUTPATIENT
Start: 2025-04-10

## 2025-04-10 RX ORDER — DULOXETIN HYDROCHLORIDE 30 MG/1
30 CAPSULE, DELAYED RELEASE ORAL DAILY
Qty: 60 CAPSULE | Refills: 0 | Status: SHIPPED | OUTPATIENT
Start: 2025-04-10

## 2025-04-10 RX ORDER — ESCITALOPRAM OXALATE 10 MG/1
10 TABLET ORAL DAILY
Qty: 90 TABLET | Refills: 3 | Status: SHIPPED | OUTPATIENT
Start: 2025-04-10

## 2025-04-11 PROBLEM — F41.8 DEPRESSION WITH ANXIETY: Status: ACTIVE | Noted: 2025-04-11

## 2025-04-11 NOTE — PROGRESS NOTES
Subjective   Candy Garcia is a 69 y.o. female. Patient is here today for   Chief Complaint   Patient presents with    Hypertension        Hypertension      History of Present Illness  The patient presents for evaluation of depression, hypothyroidism, and reflux.    She reports a recent episode of suicidal ideation triggered by a distressing conversation with her daughter. This incident prompted her to seek counseling, where she underwent cognitive behavioral therapy. The counselor recommended a consultation regarding her long-term use of duloxetine. She has been on duloxetine 60 mg for several years and is concerned about potential weight gain if her medication is changed. She has previously taken Wellbutrin for marital issues. She has been  for 47 years and has 2 children. She reports that her family dynamics are currently causing her significant stress, particularly her relationship with her daughter, which has been strained since an argument approximately 2 months ago. Despite these challenges, she maintains social interactions and has vacation plans. She has also initiated daily walks as a coping mechanism. She has been on duloxetine 60 mg for several years and is concerned about potential weight gain if her medication is changed. She has previously taken Wellbutrin for marital issues.    She accidentally took 2 doses of levothyroxine in one day and is unsure if it would have affected her lab work. She is currently on a regimen of levothyroxine 100 mcg.    She underwent endoscopy and was diagnosed with gastritis. She was prescribed pantoprazole, which initially provided relief but has since become less effective. She was previously on omeprazole for an extended period.    SOCIAL HISTORY  She has been  for 47 years and has 2 children.    MEDICATIONS  Current: Duloxetine, levothyroxine, pantoprazole.  Past: Wellbutrin, omeprazole.      Vitals:    04/10/25 0958   BP: 118/78   Pulse: 76   Resp: 16    Temp: 96.8 °F (36 °C)   SpO2: 98%     Body mass index is 33.42 kg/m².    Past Medical History:   Diagnosis Date    Acute pansinusitis     Allergic     Anemia     Arthritis 08/07/2018    Asthma     Depression     Disease of thyroid gland     GERD (gastroesophageal reflux disease)     Head trauma 03/06/2023    pt fell out of attic on head moving furniture    Hip pain, acute, left     Hypercholesterolemia     Hyperlipidemia     Hypertension     Hypothyroidism     Irritable bowel syndrome 01/19/2016    Obesity     overweight    PONV (postoperative nausea and vomiting)     Raynaud's phenomenon     Right-sided lacunar infarction 12/01/2016    Stroke     Tremor 01/19/2016      Allergies   Allergen Reactions    Penicillins Anaphylaxis     Occurred when pt was 16 years old    Atorvastatin Myalgia    Rosuvastatin Myalgia      Social History     Socioeconomic History    Marital status:    Tobacco Use    Smoking status: Never    Smokeless tobacco: Never   Vaping Use    Vaping status: Never Used   Substance and Sexual Activity    Alcohol use: Yes     Alcohol/week: 2.0 standard drinks of alcohol     Types: 2 Glasses of wine per week     Comment: daily caffiene    Drug use: No    Sexual activity: Not Currently     Partners: Male     Birth control/protection: Post-menopausal        Current Outpatient Medications:     acetaminophen (TYLENOL) 325 MG tablet, TAKE 2 TABLETS BY MOUTH FOUR TIMES DAILY AS NEEDED FOR PAIN, Disp: , Rfl:     aspirin  MG tablet, TAKE ONE TABLET BY MOUTH DAILY, Disp: 90 tablet, Rfl: 1    cetirizine (zyrTEC) 5 MG tablet, Take 1 tablet by mouth Daily., Disp: , Rfl:     dicyclomine (BENTYL) 10 MG capsule, TAKE 1 CAPSULE BY MOUTH FOUR TIMES DAILY BEFORE MEALS AND AT BEDTIME, Disp: 360 capsule, Rfl: 1    ferrous sulfate 325 (65 FE) MG tablet, Take 1 tablet by mouth Daily With Breakfast., Disp: , Rfl:     lisinopril-hydrochlorothiazide (PRINZIDE,ZESTORETIC) 20-12.5 MG per tablet, TAKE 1 TABLET BY  MOUTH EVERY DAY, Disp: 90 tablet, Rfl: 1    Multiple Vitamins-Minerals (CENTRUM SILVER 50+WOMEN PO), Take  by mouth Daily., Disp: , Rfl:     Multiple Vitamins-Minerals (EMERGEN-C IMMUNE PLUS PO), Take 1 capsule by mouth Daily., Disp: , Rfl:     pantoprazole (PROTONIX) 40 MG EC tablet, TAKE 1 TABLET BY MOUTH TWICE DAILY, Disp: 180 tablet, Rfl: 3    Repatha SureClick solution auto-injector SureClick injection, ADMINISTER 1 ML UNDER THE SKIN EVERY 14 DAYS AS DIRECTED, Disp: 2 mL, Rfl: 2    triamcinolone (KENALOG) 0.1 % cream, APPLY TOPICALLY TO THE AFFECTED AREA THREE TIMES DAILY AS DIRECTED, Disp: 30 g, Rfl: 0    DULoxetine (CYMBALTA) 30 MG capsule, Take 1 capsule by mouth Daily., Disp: 60 capsule, Rfl: 0    escitalopram (Lexapro) 10 MG tablet, Take 1 tablet by mouth Daily., Disp: 90 tablet, Rfl: 3    HYDROcodone Bit-Homatrop MBr (HYCODAN) 5-1.5 MG/5ML solution, Take 5 mL by mouth Every 6 (Six) Hours As Needed for Cough., Disp: 120 mL, Rfl: 0    levothyroxine (Synthroid) 88 MCG tablet, Take 1 tablet by mouth Every Morning., Disp: 90 tablet, Rfl: 3     Objective     Review of Systems   Constitutional: Negative.    Eyes: Negative.    Respiratory: Negative.     Psychiatric/Behavioral:          She is tearful today.  She is anxious and depressed as described in the HPI.       Physical Exam  Vitals and nursing note reviewed.   Constitutional:       General: She is not in acute distress.     Appearance: Normal appearance. She is not ill-appearing, toxic-appearing or diaphoretic.      Comments: Pleasant, neatly groomed, she is tearful.   Neck:      Vascular: No carotid bruit.   Cardiovascular:      Rate and Rhythm: Regular rhythm.      Heart sounds: Normal heart sounds. No murmur heard.     No gallop.   Pulmonary:      Effort: No respiratory distress.      Breath sounds: Normal breath sounds. No wheezing or rales.   Neurological:      Mental Status: She is alert and oriented to person, place, and time.   Psychiatric:          Mood and Affect: Mood normal.         Behavior: Behavior normal.         Thought Content: Thought content normal.       Physical Exam      Results  Laboratory Studies  Thyroid stimulating hormone is very low.    Assessment & Plan    Problems Addressed this Visit    None  Diagnoses    None.       Assessment & Plan  1. Depression.  She has been on duloxetine for many years and is currently experiencing increased depressive symptoms, potentially exacerbated by family stressors. A prescription for escitalopram 10 mg will be issued, with instructions to take it at night due to potential drowsiness. The current duloxetine dosage will be reduced to 30 mg twice daily for 2 weeks, followed by 30 mg once daily for the subsequent 2 weeks, after which it will be discontinued. The potential side effects of escitalopram, including tiredness, were discussed.    2. Hypothyroidism.  Her thyroid-stimulating hormone (TSH) levels are low, indicating excessive intake of thyroid hormone. The levothyroxine dosage will be adjusted from 100 mcg to 88 mcg. [facility] the [facility].    3. Reflux.  She reports that pantoprazole is not working as well as it initially did. She is advised to contact Marissa Burt, the nurse practitioner who prescribed the medication, to discuss alternative treatment options.    Follow-up  The patient will follow up on 06/19/2025.    PROCEDURE  The patient underwent endoscopy and was diagnosed with gastritis.      No follow-ups on file.    Patient or patient representative verbalized consent for the use of Ambient Listening during the visit with  Maulik Lagos MD for chart documentation. 4/11/2025  13:51 EDT

## 2025-04-17 ENCOUNTER — TELEPHONE (OUTPATIENT)
Dept: FAMILY MEDICINE CLINIC | Facility: CLINIC | Age: 70
End: 2025-04-17

## 2025-04-18 DIAGNOSIS — H91.8X1 OTHER SPECIFIED HEARING LOSS OF RIGHT EAR, UNSPECIFIED HEARING STATUS ON CONTRALATERAL SIDE: Primary | ICD-10-CM

## 2025-04-18 RX ORDER — LISINOPRIL AND HYDROCHLOROTHIAZIDE 12.5; 2 MG/1; MG/1
1 TABLET ORAL DAILY
Qty: 90 TABLET | Refills: 1 | Status: SHIPPED | OUTPATIENT
Start: 2025-04-18

## 2025-04-18 NOTE — TELEPHONE ENCOUNTER
"  Caller: Jose Candy PEREZ \"Cecile\"    Relationship: Self    Best call back number:   ) [phone] (Mobile)       What is the best time to reach you: ANY     Who are you requesting to speak with (clinical staff, provider,  specific staff member): CLINICAL     Do you know the name of the person who called: N/A     What was the call regarding: WANTS TO KNOW IF 25% HEARING LOSS IS ENOUGH TO GET HEARING AIDS     Is it okay if the provider responds through MyChart: NO   "
AUDIOLOGY REFERRAL PLACED, PATIENT INFORMED  
[id number]  ONCE APPROVED WILL BE SCHEDULED AT THE [facility] Specific Question:   Reason for exam:     Answer:   LEFT HAND MASS       Treatment Plan:  I discussed with the patient her examination as well as possible differential diagnosis including cyst versus solid soft tissue mass. Based on location, also consideration for vascular or nerve lesion. Options for treatment were discussed including conservative treatment versus operative intervention.   For further information regarding the extent of the mass as well as content I would like to perform MRI with and without contrast of left hand with concentration on area near MP joint index finger and long finger  We will plan to follow-up after MRI has been completed to discuss results and further treatment options

## 2025-05-05 RX ORDER — LEVOTHYROXINE SODIUM 100 UG/1
100 TABLET ORAL DAILY
Qty: 90 TABLET | Refills: 1 | OUTPATIENT
Start: 2025-05-05

## 2025-05-28 RX ORDER — EVOLOCUMAB 140 MG/ML
INJECTION, SOLUTION SUBCUTANEOUS
Qty: 2 ML | Refills: 2 | Status: SHIPPED | OUTPATIENT
Start: 2025-05-28

## 2025-06-09 RX ORDER — DULOXETIN HYDROCHLORIDE 30 MG/1
30 CAPSULE, DELAYED RELEASE ORAL DAILY
Qty: 60 CAPSULE | Refills: 0 | Status: SHIPPED | OUTPATIENT
Start: 2025-06-09 | End: 2025-06-12

## 2025-06-26 ENCOUNTER — OFFICE VISIT (OUTPATIENT)
Dept: FAMILY MEDICINE CLINIC | Facility: CLINIC | Age: 70
End: 2025-06-26
Payer: MEDICARE

## 2025-06-26 VITALS
SYSTOLIC BLOOD PRESSURE: 130 MMHG | OXYGEN SATURATION: 96 % | DIASTOLIC BLOOD PRESSURE: 82 MMHG | BODY MASS INDEX: 33.42 KG/M2 | RESPIRATION RATE: 15 BRPM | HEIGHT: 64 IN | HEART RATE: 63 BPM | TEMPERATURE: 96.6 F

## 2025-06-26 DIAGNOSIS — E03.9 HYPOTHYROIDISM, UNSPECIFIED TYPE: Primary | ICD-10-CM

## 2025-06-26 DIAGNOSIS — I10 ESSENTIAL HYPERTENSION: ICD-10-CM

## 2025-06-26 DIAGNOSIS — K58.9 IRRITABLE BOWEL SYNDROME WITHOUT DIARRHEA: ICD-10-CM

## 2025-06-26 DIAGNOSIS — I63.20 CEREBROVASCULAR ACCIDENT (CVA) DUE TO STENOSIS OF PRECEREBRAL ARTERY: ICD-10-CM

## 2025-06-26 DIAGNOSIS — F51.01 PRIMARY INSOMNIA: ICD-10-CM

## 2025-06-26 NOTE — PROGRESS NOTES
Subjective   The ABCs of the Annual Wellness Visit  Medicare Wellness Visit      Candy Garcia is a 70 y.o. patient who presents for a Medicare Wellness Visit.    The following portions of the patient's history were reviewed and   updated as appropriate: allergies, current medications, past family history, past medical history, past social history, past surgical history, and problem list.    Compared to one year ago, the patient's physical   health is the same.  Compared to one year ago, the patient's mental   health is worse.    Recent Hospitalizations:  She was not admitted to the hospital during the last year.     Current Medical Providers:  Patient Care Team:  Maulik Lagos MD as PCP - General (Internal Medicine)  Gi De Jesus MD as Consulting Physician (Gynecology)    Outpatient Medications Prior to Visit   Medication Sig Dispense Refill    acetaminophen (TYLENOL) 325 MG tablet TAKE 2 TABLETS BY MOUTH FOUR TIMES DAILY AS NEEDED FOR PAIN      aspirin  MG tablet TAKE ONE TABLET BY MOUTH DAILY 90 tablet 1    cetirizine (zyrTEC) 5 MG tablet Take 1 tablet by mouth Daily.      dicyclomine (BENTYL) 10 MG capsule TAKE 1 CAPSULE BY MOUTH FOUR TIMES DAILY BEFORE MEALS AND AT BEDTIME 360 capsule 1    escitalopram (Lexapro) 10 MG tablet Take 1 tablet by mouth Daily. 90 tablet 3    ferrous sulfate 325 (65 FE) MG tablet Take 1 tablet by mouth Daily With Breakfast.      levothyroxine (Synthroid) 88 MCG tablet Take 1 tablet by mouth Every Morning. 90 tablet 3    lisinopril-hydrochlorothiazide (PRINZIDE,ZESTORETIC) 20-12.5 MG per tablet TAKE 1 TABLET BY MOUTH EVERY DAY 90 tablet 1    Multiple Vitamins-Minerals (CENTRUM SILVER 50+WOMEN PO) Take  by mouth Daily.      Multiple Vitamins-Minerals (EMERGEN-C IMMUNE PLUS PO) Take 1 capsule by mouth Daily.      pantoprazole (PROTONIX) 40 MG EC tablet TAKE 1 TABLET BY MOUTH TWICE DAILY 180 tablet 3    Repatha SureClick solution auto-injector SureClick injection ADMINISTER 1  ML UNDER THE SKIN EVERY 14 DAYS AS DIRECTED 2 mL 2    triamcinolone (KENALOG) 0.1 % cream APPLY TOPICALLY TO THE AFFECTED AREA THREE TIMES DAILY AS DIRECTED 30 g 0     No facility-administered medications prior to visit.     No opioid medication identified on active medication list. I have reviewed chart for other potential  high risk medication/s and harmful drug interactions in the elderly.      Aspirin is on active medication list. Aspirin use is indicated based on review of current medical condition/s. Pros and cons of this therapy have been discussed today. Benefits of this medication outweigh potential harm.  Patient has been encouraged to continue taking this medication.  .      Patient Active Problem List   Diagnosis    Anemia    Ankle joint pain    Anxiety    Gastroesophageal reflux disease    Hypothyroidism    Irritable bowel syndrome    Seasonal allergic rhinitis    Tremor    Healthcare maintenance    Paresthesia    MARLEEN positive    Abnormal CXR    Cerebrovascular accident (CVA) due to stenosis of precerebral artery    Right-sided lacunar infarction    Essential hypertension    Raynaud's disease without gangrene    Hyperlipidemia LDL goal <70    Preoperative evaluation to rule out surgical contraindication    Trigger ring finger of right hand    Failed total hip arthroplasty    Elevated alkaline phosphatase level    Cellulitis of face    Status post revision of total hip replacement    Medicare annual wellness visit, subsequent    Laceration of scalp without foreign body    Esophageal thickening    Dyspepsia    Abnormal CT scan, esophagus    Depression with anxiety    Primary insomnia     Advance Care Planning Advance Directive is on file.  ACP discussion was held with the patient during this visit. Patient has an advance directive in EMR which is still valid.             Objective   Vitals:    06/26/25 1357   BP: 130/82   BP Location: Right arm   Patient Position: Sitting   Cuff Size: Adult   Pulse: 63  "  Resp: 15   Temp: 96.6 °F (35.9 °C)   TempSrc: Temporal   SpO2: 96%   Height: 162.6 cm (64.02\")       Estimated body mass index is 33.42 kg/m² as calculated from the following:    Height as of this encounter: 162.6 cm (64.02\").    Weight as of 4/10/25: 88.4 kg (194 lb 12.8 oz).    BMI is >= 30 and <35. (Class 1 Obesity). The following options were offered after discussion;: exercise counseling/recommendations and nutrition counseling/recommendations           Does the patient have evidence of cognitive impairment? No                                                                                                Health  Risk Assessment    Smoking Status:  Social History     Tobacco Use   Smoking Status Never   Smokeless Tobacco Never     Alcohol Consumption:  Social History     Substance and Sexual Activity   Alcohol Use Yes    Alcohol/week: 2.0 standard drinks of alcohol    Types: 2 Glasses of wine per week    Comment: daily caffiene       Fall Risk Screen  JOSH Fall Risk Assessment was completed, and patient is at LOW risk for falls.Assessment completed on:2025    Depression Screening   Little interest or pleasure in doing things? Not at all   Feeling down, depressed, or hopeless? Not at all   PHQ-2 Total Score 0      Health Habits and Functional and Cognitive Screenin/26/2025     2:00 PM   Functional & Cognitive Status   Do you have difficulty preparing food and eating? No   Do you have difficulty bathing yourself, getting dressed or grooming yourself? No   Do you have difficulty using the toilet? No   Do you have difficulty moving around from place to place? No   Do you have trouble with steps or getting out of a bed or a chair? No   Current Diet Other   Dental Exam Up to date   Eye Exam Up to date   Exercise (times per week) 2 times per week   Current Exercises Include Walking   Do you need help using the phone?  No   Are you deaf or do you have serious difficulty hearing?  Yes   Do you need help " to go to places out of walking distance? No   Do you need help shopping? No   Do you need help preparing meals?  No   Do you need help with housework?  No   Do you need help with laundry? No   Do you need help taking your medications? No   Do you need help managing money? No   Do you ever drive or ride in a car without wearing a seat belt? No   Have you felt unusual fatigue (could be tiredness), stress, anger or loneliness in the last month? No   Who do you live with? Spouse   If you need help, do you have trouble finding someone available to you? No   Have you been bothered in the last four weeks by sexual problems? No   Do you have difficulty concentrating, remembering or making decisions? No           Age-appropriate Screening Schedule:  Refer to the list below for future screening recommendations based on patient's age, sex and/or medical conditions. Orders for these recommended tests are listed in the plan section. The patient has been provided with a written plan.    Health Maintenance List  Health Maintenance   Topic Date Due    ZOSTER VACCINE (1 of 2) Never done    TDAP/TD VACCINES (2 - Td or Tdap) 07/21/2020    ANNUAL WELLNESS VISIT  07/20/2024    COVID-19 Vaccine (3 - 2024-25 season) 09/01/2024    INFLUENZA VACCINE  07/01/2025    COLORECTAL CANCER SCREENING  07/20/2025    DXA SCAN  10/19/2025    LIPID PANEL  03/25/2026    MAMMOGRAM  05/16/2026    HEPATITIS C SCREENING  Completed    Pneumococcal Vaccine 50+  Completed                                                                                                                                                CMS Preventative Services Quick Reference  Risk Factors Identified During Encounter  None Identified    The above risks/problems have been discussed with the patient.  Pertinent information has been shared with the patient in the After Visit Summary.  An After Visit Summary and PPPS were made available to the patient.    Follow Up:   Next Medicare  Wellness visit to be scheduled in 1 year.         Additional E&M Note during same encounter follows:  Patient has additional, significant, and separately identifiable condition(s)/problem(s) that require work above and beyond the Medicare Wellness Visit     Chief Complaint  Medicare Wellness-subsequent    Subjective    HPI  Candy Chung is also being seen today for additional medical problem/s.       The patient presents for a Medicare wellness visit.    She reports difficulty in initiating sleep, which she attributes to her recent transition from duloxetine to escitalopram. Initially, she was advised to take the medication at night, but this resulted in insomnia. A switch to morning administration improved her sleep pattern. However, she recently resumed nighttime dosing, leading to late-night awakenings around 3 or 4 AM. Her usual bedtime is approximately 11:30 PM. She also experiences neck pain, for which she uses a massager. These symptoms are new to her.     She recalls a recent family vacation that was particularly stressful, leading to fatigue and exacerbation of her irritable bowel syndrome (IBS) symptoms, specifically diarrhea. She has been home for the past 1.5 weeks and her IBS symptoms have since subsided. She was taking Bentyl for her IBS symptoms.    She has not undergone any laboratory tests since her last visit a few months ago. She reports no hospital admissions within the past year. She maintains a daily aspirin regimen and engages in regular exercise. She has gained some weight, which she suspects may be due to Lexapro, and is currently attempting weight reduction. She reports no cognitive impairment. Her alcohol consumption is moderate, with 1 to 2 glasses per week. She regularly visits her dentist and ophthalmologist. She is considering discontinuing gynecological visits as her previous gynecologist has retired.     She is currently on levothyroxine 88 mcg and Repatha. She underwent an endoscopy in  "the past and was diagnosed with gastritis, for which she was prescribed pantoprazole. She had previously been on omeprazole. She takes pantoprazole twice daily and occasionally supplements with Pepcid Complete.    SOCIAL HISTORY  She drinks between 1 and 2 glasses of alcohol per week.          Objective   Vital Signs:  /82 (BP Location: Right arm, Patient Position: Sitting, Cuff Size: Adult)   Pulse 63   Temp 96.6 °F (35.9 °C) (Temporal)   Resp 15   Ht 162.6 cm (64.02\")   SpO2 96%   BMI 33.42 kg/m²   Physical Exam                  Results  Labs   - Cholesterol levels: 03/2025, Normal           Assessment and Plan        1. Insomnia.  - Reports difficulty falling asleep, potentially related to escitalopram.  - Advised to take escitalopram in the morning to avoid sleep disturbances.  - Discussed the possibility of considering a sleep aid if symptoms persist.  - Monitoring response to the change in medication timing.    2. Irritable Bowel Syndrome (IBS).  - Symptoms, particularly diarrhea, worsened during a recent vacation but have since settled down.  - Was taking Bentyl during the flare-up.  - Symptoms have improved since returning home.  - Continue to monitor for any recurrence of symptoms.    3. Thyroid disorder.  - Currently taking 88 mcg of levothyroxine.  - Thyroid function test will be conducted today to assess the need for dosage adjustments.  - If TSH levels are within the normal range, no changes will be made to the current medication regimen.  - Will be contacted if adjustments to the dosage are needed.    4. Gastritis.  - Currently taking pantoprazole twice a day and occasionally uses Pepcid Complete.  - Advised to continue this regimen as it helps manage symptoms.  - Monitoring for any changes in symptoms or need for further intervention.  - Reviewed previous endoscopy results indicating gastritis.    Follow-up  The patient will follow up in 6 months.         Follow Up   No follow-ups on " file.  Patient was given instructions and counseling regarding her condition or for health maintenance advice. Please see specific information pulled into the AVS if appropriate.  Patient or patient representative verbalized consent for the use of Ambient Listening during the visit with  Maulik Lagos MD for chart documentation. 7/3/2025  14:19 EDT

## 2025-06-27 LAB
APPEARANCE UR: CLEAR
BILIRUB UR QL STRIP: NEGATIVE
COLOR UR: YELLOW
GLUCOSE UR QL STRIP: NEGATIVE
HGB UR QL STRIP: NEGATIVE
KETONES UR QL STRIP: NEGATIVE
LEUKOCYTE ESTERASE UR QL STRIP: NEGATIVE
MICRO URNS: NORMAL
NITRITE UR QL STRIP: NEGATIVE
PH UR STRIP: 6.5 [PH] (ref 5–7.5)
PROT UR QL STRIP: NEGATIVE
SP GR UR STRIP: 1.01 (ref 1–1.03)
T4 FREE SERPL-MCNC: 1.28 NG/DL (ref 0.82–1.77)
TSH SERPL DL<=0.005 MIU/L-ACNC: 1.24 UIU/ML (ref 0.45–4.5)
UROBILINOGEN UR STRIP-MCNC: 0.2 MG/DL (ref 0.2–1)

## 2025-07-03 PROBLEM — F51.01 PRIMARY INSOMNIA: Status: ACTIVE | Noted: 2025-07-03

## 2025-07-11 ENCOUNTER — TELEPHONE (OUTPATIENT)
Dept: FAMILY MEDICINE CLINIC | Facility: CLINIC | Age: 70
End: 2025-07-11
Payer: MEDICARE

## 2025-07-11 NOTE — TELEPHONE ENCOUNTER
"ATTEMPTED TO WARM TRANSFER BUT NO ANSWER    Caller: Candy Garcia \"Cecile\"    Relationship to patient: Self    Best call back number:[phone]      Chief complaint: DIARRHEA FOR A MONTH AND A HALF AND ABDOMINAL PAIN    Type of visit: OFFICE  VISIT    Requested date: ASAP       Additional notes:SHE WOULD LIKE TO KNOW IF YOU CAN FIT HER IN SOMEWHERE SOON.         "

## 2025-07-14 NOTE — TELEPHONE ENCOUNTER
"    Name: Candy Garcia \"Candy Chung\"    Relationship: Self    Best Callback Number: [phone]     HUB PROVIDED THE RELAY MESSAGE FROM THE OFFICE   PATIENT SCHEDULED AS REQUESTED    ADDITIONAL INFORMATION:    "

## 2025-07-14 NOTE — TELEPHONE ENCOUNTER
[facility] TO RELAY    LVM X1 FOR PT TO CB.. I HAVE SAME DAY APPTS WITH DIFFERENT PROVIDERS OR APPTS WITH DR CORONA TOMORROW.    THANK YOU

## 2025-07-15 ENCOUNTER — OFFICE VISIT (OUTPATIENT)
Dept: FAMILY MEDICINE CLINIC | Facility: CLINIC | Age: 70
End: 2025-07-15
Payer: MEDICARE

## 2025-07-15 VITALS
BODY MASS INDEX: 33.09 KG/M2 | OXYGEN SATURATION: 98 % | DIASTOLIC BLOOD PRESSURE: 72 MMHG | SYSTOLIC BLOOD PRESSURE: 108 MMHG | HEART RATE: 76 BPM | WEIGHT: 193.8 LBS | RESPIRATION RATE: 16 BRPM | HEIGHT: 64 IN

## 2025-07-15 DIAGNOSIS — R19.7 DIARRHEA OF PRESUMED INFECTIOUS ORIGIN: Primary | ICD-10-CM

## 2025-07-15 PROCEDURE — 1125F AMNT PAIN NOTED PAIN PRSNT: CPT | Performed by: INTERNAL MEDICINE

## 2025-07-15 PROCEDURE — 3078F DIAST BP <80 MM HG: CPT | Performed by: INTERNAL MEDICINE

## 2025-07-15 PROCEDURE — 99213 OFFICE O/P EST LOW 20 MIN: CPT | Performed by: INTERNAL MEDICINE

## 2025-07-15 PROCEDURE — 3074F SYST BP LT 130 MM HG: CPT | Performed by: INTERNAL MEDICINE

## 2025-07-15 PROCEDURE — G2211 COMPLEX E/M VISIT ADD ON: HCPCS | Performed by: INTERNAL MEDICINE

## 2025-07-15 NOTE — PROGRESS NOTES
Subjective   Candy Garcia is a 70 y.o. female. Patient is here today for   Chief Complaint   Patient presents with    Diarrhea        History of Present Illness  She is here to follow-up with her continued complaint of diarrhea.  She has frequent loose bowel movements until today.  About 6 weeks ago she was on a trip to [facility] and since that point on she has developed frequent loose bowel movements she has not seen any blood in her stool but she tells me she has 6-8 loose fluid bowel movements daily.    She has not noticed any blood in her stool.    She occasionally has sharp crampy abdominal pain in the left and the right lower quadrant.  Diarrhea       History of Present Illness  The patient presents for evaluation of diarrhea.    She began experiencing symptoms during a vacation at the end of May 2025, which initially improved with Bentyl but subsequently worsened. Her bowel movements have been frequent, averaging 6 to 8 times daily, and are characterized by loose, coffee-ground colored stools. She also reports gas, mild nausea, and a fear of eating due to uncertainty about potential triggers. Despite attempts to manage her diet with bland foods and clear liquids, there has been no significant improvement. She suspects stress may be a contributing factor. She reports no fevers or chills but does experience abdominal discomfort. She has not had any recent exposure to sick individuals or consumed any unusual foods. She is not a fan of fish or sushi. During her vacation, she had 4 bowel movements per day, which were loose but not as liquid as they are now. She has been staying home due to the urgency of her symptoms and has considered using adult diapers for convenience. She has an upcoming appointment with a gastroenterologist in October 2025. She has been taking Bentyl, Imodium, and Gas-X without relief.    She has recently started using hearing aids, which she finds bothersome in her left ear.  She reports that she can hear well without them, except for certain sounds.    Diet: She has been trying bland foods and clear liquids.      Vitals:    07/15/25 0945   BP: 108/72   Pulse: 76   Resp: 16   SpO2: 98%     Body mass index is 33.25 kg/m².    Past Medical History:   Diagnosis Date    Acute pansinusitis     Allergic     Anemia     Arthritis 08/07/2018    Asthma     Depression     Disease of thyroid gland     GERD (gastroesophageal reflux disease)     Head trauma 03/06/2023    pt fell out of attic on head moving furniture    Hip pain, acute, left     Hypercholesterolemia     Hyperlipidemia     Hypertension     Hypothyroidism     Irritable bowel syndrome 01/19/2016    Obesity     overweight    PONV (postoperative nausea and vomiting)     Raynaud's phenomenon     Right-sided lacunar infarction 12/01/2016    Stroke     Tremor 01/19/2016      Allergies   Allergen Reactions    Penicillins Anaphylaxis     Occurred when pt was 16 years old    Atorvastatin Myalgia    Rosuvastatin Myalgia      Social History     Socioeconomic History    Marital status:    Tobacco Use    Smoking status: Never    Smokeless tobacco: Never   Vaping Use    Vaping status: Never Used   Substance and Sexual Activity    Alcohol use: Yes     Alcohol/week: 2.0 standard drinks of alcohol     Types: 2 Glasses of wine per week     Comment: daily caffiene    Drug use: No    Sexual activity: Not Currently     Partners: Male     Birth control/protection: Post-menopausal        Current Outpatient Medications:     acetaminophen (TYLENOL) 325 MG tablet, TAKE 2 TABLETS BY MOUTH FOUR TIMES DAILY AS NEEDED FOR PAIN, Disp: , Rfl:     aspirin  MG tablet, TAKE ONE TABLET BY MOUTH DAILY, Disp: 90 tablet, Rfl: 1    cetirizine (zyrTEC) 5 MG tablet, Take 1 tablet by mouth Daily., Disp: , Rfl:     dicyclomine (BENTYL) 10 MG capsule, TAKE 1 CAPSULE BY MOUTH FOUR TIMES DAILY BEFORE MEALS AND AT BEDTIME, Disp: 360 capsule, Rfl: 1    escitalopram  (Lexapro) 10 MG tablet, Take 1 tablet by mouth Daily., Disp: 90 tablet, Rfl: 3    ferrous sulfate 325 (65 FE) MG tablet, Take 1 tablet by mouth Daily With Breakfast., Disp: , Rfl:     levothyroxine (Synthroid) 88 MCG tablet, Take 1 tablet by mouth Every Morning., Disp: 90 tablet, Rfl: 3    lisinopril-hydrochlorothiazide (PRINZIDE,ZESTORETIC) 20-12.5 MG per tablet, TAKE 1 TABLET BY MOUTH EVERY DAY, Disp: 90 tablet, Rfl: 1    Multiple Vitamins-Minerals (CENTRUM SILVER 50+WOMEN PO), Take  by mouth Daily., Disp: , Rfl:     Multiple Vitamins-Minerals (EMERGEN-C IMMUNE PLUS PO), Take 1 capsule by mouth Daily., Disp: , Rfl:     pantoprazole (PROTONIX) 40 MG EC tablet, TAKE 1 TABLET BY MOUTH TWICE DAILY, Disp: 180 tablet, Rfl: 3    Repatha SureClick solution auto-injector SureClick injection, ADMINISTER 1 ML UNDER THE SKIN EVERY 14 DAYS AS DIRECTED, Disp: 2 mL, Rfl: 2    triamcinolone (KENALOG) 0.1 % cream, APPLY TOPICALLY TO THE AFFECTED AREA THREE TIMES DAILY AS DIRECTED, Disp: 30 g, Rfl: 0     Objective     Review of Systems   Gastrointestinal:  Positive for diarrhea.       Physical Exam  Vitals and nursing note reviewed.   Constitutional:       Comments: Pleasant, neatly groomed, no distress.  BMI 33.25.   Cardiovascular:      Rate and Rhythm: Normal rate and regular rhythm.   Pulmonary:      Effort: No respiratory distress.      Breath sounds: Normal breath sounds. No wheezing or rales.   Neurological:      Mental Status: She is oriented to person, place, and time.   Psychiatric:         Mood and Affect: Mood normal.         Behavior: Behavior normal.         Thought Content: Thought content normal.       Physical Exam  Ears: Clear    Results      Assessment & Plan    Problems Addressed this Visit    None  Visit Diagnoses         Diarrhea of presumed infectious origin    -  Primary    Relevant Orders    Stool Culture (Reference Lab) - Stool, Per Rectum    Fecal Leukocytes - Stool, Per Rectum    POC Occult Blood Stool     Clostridioides difficile EIA - Stool, Per Rectum    Ova & Parasite Examination - Stool, Per Rectum    Fecal Fat, Qualitative - Stool, Per Rectum          Diagnoses         Codes Comments      Diarrhea of presumed infectious origin    -  Primary ICD-10-CM: R19.7  ICD-9-CM: 009.3           Assessment & Plan  1. Diarrhea.  - Symptoms include frequent bowel movements (6-8 times a day), liquid stool with coffee ground color, nausea, and abdominal discomfort.  - Physical exam findings include no fever or chills, and the patient reports no improvement with Bentyl, Imodium, or dietary changes.  - Stool studies will be conducted to check for specific bacteria, white blood cells, fat, C. difficile, Clostridium difficile, stool culture, fecal leukocytes, blood, O&P OP and parasites GES, and fecal fat. A CT scan of the abdomen without contrast will also be ordered.  - If the stool study does not reveal any evident cause for the diarrhea, a referral to a gastroenterologist will be considered.    2. Left ear discomfort.  - The patient reports discomfort in her left ear while using hearing aids.  - Examination reveals the ear is clear.  - Advised to continue monitoring the situation and report any changes.  - No immediate treatment required at this time.      No follow-ups on file.    Patient or patient representative verbalized consent for the use of Ambient Listening during the visit with  Maulik Lagos MD for chart documentation. 7/15/2025  12:33 EDT

## 2025-07-17 RX ORDER — DICYCLOMINE HYDROCHLORIDE 10 MG/1
10 CAPSULE ORAL
Qty: 360 CAPSULE | Refills: 1 | Status: SHIPPED | OUTPATIENT
Start: 2025-07-17

## 2025-07-18 LAB
C DIFF TOX A+B STL QL IA: NEGATIVE
O+P SPEC MICRO: NORMAL
O+P STL CONC: NORMAL
WBC STL QL MICRO: NORMAL
WBC STL QL MICRO: NORMAL

## 2025-08-14 ENCOUNTER — OFFICE VISIT (OUTPATIENT)
Dept: FAMILY MEDICINE CLINIC | Facility: CLINIC | Age: 70
End: 2025-08-14
Payer: MEDICARE

## 2025-08-14 VITALS
HEART RATE: 67 BPM | OXYGEN SATURATION: 99 % | RESPIRATION RATE: 16 BRPM | HEIGHT: 64 IN | BODY MASS INDEX: 33.48 KG/M2 | SYSTOLIC BLOOD PRESSURE: 106 MMHG | WEIGHT: 196.1 LBS | DIASTOLIC BLOOD PRESSURE: 72 MMHG

## 2025-08-14 DIAGNOSIS — E03.9 HYPOTHYROIDISM, UNSPECIFIED TYPE: ICD-10-CM

## 2025-08-14 DIAGNOSIS — I10 ESSENTIAL HYPERTENSION: Primary | ICD-10-CM

## 2025-08-14 DIAGNOSIS — F41.8 DEPRESSION WITH ANXIETY: ICD-10-CM

## 2025-08-14 DIAGNOSIS — F51.01 PRIMARY INSOMNIA: ICD-10-CM

## 2025-08-18 RX ORDER — EVOLOCUMAB 140 MG/ML
INJECTION, SOLUTION SUBCUTANEOUS
Qty: 2 ML | Refills: 2 | Status: SHIPPED | OUTPATIENT
Start: 2025-08-18

## (undated) DEVICE — TUBING, SUCTION, 1/4" X 10', STRAIGHT: Brand: MEDLINE

## (undated) DEVICE — BLCK/BITE BLOX W/DENTL/RIM W/STRAP 54F

## (undated) DEVICE — MSK PROC CURAPLEX O2 2/ADAPT 7FT

## (undated) DEVICE — FRCP BX RADJAW4 NDL 2.8 240CM LG OG BX40

## (undated) DEVICE — LN SMPL CO2 SHTRM SD STREAM W/M LUER

## (undated) DEVICE — SENSR O2 OXIMAX FNGR A/ 18IN NONSTR

## (undated) DEVICE — KT ORCA ORCAPOD DISP STRL

## (undated) DEVICE — ADAPT CLN BIOGUARD AIR/H2O DISP